# Patient Record
Sex: MALE | Race: WHITE | NOT HISPANIC OR LATINO | Employment: OTHER | URBAN - METROPOLITAN AREA
[De-identification: names, ages, dates, MRNs, and addresses within clinical notes are randomized per-mention and may not be internally consistent; named-entity substitution may affect disease eponyms.]

---

## 2017-02-07 ENCOUNTER — GENERIC CONVERSION - ENCOUNTER (OUTPATIENT)
Dept: OTHER | Facility: OTHER | Age: 67
End: 2017-02-07

## 2017-02-08 ENCOUNTER — ALLSCRIPTS OFFICE VISIT (OUTPATIENT)
Dept: OTHER | Facility: OTHER | Age: 67
End: 2017-02-08

## 2017-02-16 RX ORDER — MELOXICAM 7.5 MG/1
7.5 TABLET ORAL 2 TIMES DAILY
Status: ON HOLD | COMMUNITY
End: 2017-02-21 | Stop reason: ALTCHOICE

## 2017-02-16 RX ORDER — CLOTRIMAZOLE AND BETAMETHASONE DIPROPIONATE 10; .64 MG/G; MG/G
CREAM TOPICAL
COMMUNITY
End: 2018-09-10 | Stop reason: SDUPTHER

## 2017-02-16 RX ORDER — NITROGLYCERIN 0.4 MG/1
0.4 TABLET SUBLINGUAL
COMMUNITY

## 2017-02-16 RX ORDER — OMEPRAZOLE 20 MG/1
20 CAPSULE, DELAYED RELEASE ORAL DAILY
COMMUNITY
End: 2018-02-22 | Stop reason: SDUPTHER

## 2017-02-16 RX ORDER — METOPROLOL SUCCINATE 25 MG/1
25 TABLET, EXTENDED RELEASE ORAL DAILY
COMMUNITY
End: 2018-07-03 | Stop reason: SDUPTHER

## 2017-02-18 ENCOUNTER — ALLSCRIPTS OFFICE VISIT (OUTPATIENT)
Dept: OTHER | Facility: OTHER | Age: 67
End: 2017-02-18

## 2017-02-20 ENCOUNTER — ANESTHESIA EVENT (OUTPATIENT)
Dept: PERIOP | Facility: HOSPITAL | Age: 67
End: 2017-02-20
Payer: MEDICARE

## 2017-02-21 ENCOUNTER — ANESTHESIA (OUTPATIENT)
Dept: PERIOP | Facility: HOSPITAL | Age: 67
End: 2017-02-21
Payer: MEDICARE

## 2017-02-21 ENCOUNTER — HOSPITAL ENCOUNTER (OUTPATIENT)
Facility: HOSPITAL | Age: 67
Setting detail: OUTPATIENT SURGERY
Discharge: HOME | End: 2017-02-21
Attending: INTERNAL MEDICINE | Admitting: INTERNAL MEDICINE
Payer: MEDICARE

## 2017-02-21 ENCOUNTER — GENERIC CONVERSION - ENCOUNTER (OUTPATIENT)
Dept: OTHER | Facility: OTHER | Age: 67
End: 2017-02-21

## 2017-02-21 VITALS
SYSTOLIC BLOOD PRESSURE: 120 MMHG | HEART RATE: 48 BPM | DIASTOLIC BLOOD PRESSURE: 62 MMHG | WEIGHT: 183.8 LBS | TEMPERATURE: 97.4 F | OXYGEN SATURATION: 99 % | BODY MASS INDEX: 26.31 KG/M2 | HEIGHT: 70 IN | RESPIRATION RATE: 19 BRPM

## 2017-02-21 DIAGNOSIS — Z12.11 ENCOUNTER FOR SCREENING FOR MALIGNANT NEOPLASM OF COLON: ICD-10-CM

## 2017-02-21 PROCEDURE — 88305 TISSUE EXAM BY PATHOLOGIST: CPT | Performed by: INTERNAL MEDICINE

## 2017-02-21 RX ORDER — SODIUM CHLORIDE 9 MG/ML
INJECTION, SOLUTION INTRAVENOUS CONTINUOUS PRN
Status: DISCONTINUED | OUTPATIENT
Start: 2017-02-21 | End: 2017-02-21 | Stop reason: SURG

## 2017-02-21 RX ORDER — PROPOFOL 10 MG/ML
INJECTION, EMULSION INTRAVENOUS AS NEEDED
Status: DISCONTINUED | OUTPATIENT
Start: 2017-02-21 | End: 2017-02-21 | Stop reason: SURG

## 2017-02-21 RX ORDER — SODIUM CHLORIDE, SODIUM LACTATE, POTASSIUM CHLORIDE, CALCIUM CHLORIDE 600; 310; 30; 20 MG/100ML; MG/100ML; MG/100ML; MG/100ML
100 INJECTION, SOLUTION INTRAVENOUS CONTINUOUS
Status: DISCONTINUED | OUTPATIENT
Start: 2017-02-21 | End: 2017-02-21 | Stop reason: HOSPADM

## 2017-02-21 RX ADMIN — PROPOFOL 100 MG: 10 INJECTION, EMULSION INTRAVENOUS at 07:25

## 2017-02-21 RX ADMIN — PROPOFOL 50 MG: 10 INJECTION, EMULSION INTRAVENOUS at 07:31

## 2017-02-21 RX ADMIN — SODIUM CHLORIDE, SODIUM LACTATE, POTASSIUM CHLORIDE, AND CALCIUM CHLORIDE 100 ML/HR: .6; .31; .03; .02 INJECTION, SOLUTION INTRAVENOUS at 06:30

## 2017-02-21 RX ADMIN — SODIUM CHLORIDE: 0.9 INJECTION, SOLUTION INTRAVENOUS at 07:12

## 2017-02-21 RX ADMIN — PROPOFOL 50 MG: 10 INJECTION, EMULSION INTRAVENOUS at 07:27

## 2017-03-20 ENCOUNTER — ALLSCRIPTS OFFICE VISIT (OUTPATIENT)
Dept: OTHER | Facility: OTHER | Age: 67
End: 2017-03-20

## 2017-08-07 ENCOUNTER — ALLSCRIPTS OFFICE VISIT (OUTPATIENT)
Dept: OTHER | Facility: OTHER | Age: 67
End: 2017-08-07

## 2017-09-01 DIAGNOSIS — E78.5 HYPERLIPIDEMIA: ICD-10-CM

## 2017-09-01 DIAGNOSIS — I10 ESSENTIAL (PRIMARY) HYPERTENSION: ICD-10-CM

## 2017-09-01 DIAGNOSIS — E11.9 TYPE 2 DIABETES MELLITUS WITHOUT COMPLICATIONS (HCC): ICD-10-CM

## 2017-09-20 ENCOUNTER — APPOINTMENT (OUTPATIENT)
Dept: LAB | Facility: CLINIC | Age: 67
End: 2017-09-20
Payer: MEDICARE

## 2017-09-20 ENCOUNTER — ALLSCRIPTS OFFICE VISIT (OUTPATIENT)
Dept: OTHER | Facility: OTHER | Age: 67
End: 2017-09-20

## 2017-09-20 ENCOUNTER — TRANSCRIBE ORDERS (OUTPATIENT)
Dept: MRI IMAGING | Facility: CLINIC | Age: 67
End: 2017-09-20

## 2017-09-20 DIAGNOSIS — E11.9 TYPE 2 DIABETES MELLITUS WITHOUT COMPLICATIONS (HCC): ICD-10-CM

## 2017-09-20 DIAGNOSIS — E78.5 HYPERLIPIDEMIA: ICD-10-CM

## 2017-09-20 DIAGNOSIS — C18.3 MALIGNANT NEOPLASM OF HEPATIC FLEXURE (HCC): Primary | ICD-10-CM

## 2017-09-20 DIAGNOSIS — C18.3 MALIGNANT NEOPLASM OF HEPATIC FLEXURE (HCC): ICD-10-CM

## 2017-09-20 DIAGNOSIS — I10 ESSENTIAL (PRIMARY) HYPERTENSION: ICD-10-CM

## 2017-09-20 LAB
ALBUMIN SERPL BCP-MCNC: 3.8 G/DL (ref 3.5–5)
ALP SERPL-CCNC: 39 U/L (ref 46–116)
ALT SERPL W P-5'-P-CCNC: 49 U/L (ref 12–78)
ANION GAP SERPL CALCULATED.3IONS-SCNC: 7 MMOL/L (ref 4–13)
AST SERPL W P-5'-P-CCNC: 33 U/L (ref 5–45)
BASOPHILS # BLD AUTO: 0.03 THOUSANDS/ΜL (ref 0–0.1)
BASOPHILS NFR BLD AUTO: 1 % (ref 0–1)
BILIRUB DIRECT SERPL-MCNC: 0.14 MG/DL (ref 0–0.2)
BILIRUB SERPL-MCNC: 0.48 MG/DL (ref 0.2–1)
BUN SERPL-MCNC: 14 MG/DL (ref 5–25)
CALCIUM SERPL-MCNC: 9.2 MG/DL (ref 8.3–10.1)
CEA SERPL-MCNC: <0.5 NG/ML (ref 0–3)
CHLORIDE SERPL-SCNC: 109 MMOL/L (ref 100–108)
CHOLEST SERPL-MCNC: 137 MG/DL (ref 50–200)
CO2 SERPL-SCNC: 27 MMOL/L (ref 21–32)
CREAT SERPL-MCNC: 1.17 MG/DL (ref 0.6–1.3)
EOSINOPHIL # BLD AUTO: 0.23 THOUSAND/ΜL (ref 0–0.61)
EOSINOPHIL NFR BLD AUTO: 4 % (ref 0–6)
ERYTHROCYTE [DISTWIDTH] IN BLOOD BY AUTOMATED COUNT: 13.3 % (ref 11.6–15.1)
EST. AVERAGE GLUCOSE BLD GHB EST-MCNC: 108 MG/DL
GFR SERPL CREATININE-BSD FRML MDRD: 63 ML/MIN/1.73SQ M
GLUCOSE P FAST SERPL-MCNC: 98 MG/DL (ref 65–99)
HBA1C MFR BLD: 5.4 % (ref 4.2–6.3)
HCT VFR BLD AUTO: 42.7 % (ref 36.5–49.3)
HDLC SERPL-MCNC: 44 MG/DL (ref 40–60)
HGB BLD-MCNC: 14.7 G/DL (ref 12–17)
LDLC SERPL CALC-MCNC: 69 MG/DL (ref 0–100)
LYMPHOCYTES # BLD AUTO: 1.39 THOUSANDS/ΜL (ref 0.6–4.47)
LYMPHOCYTES NFR BLD AUTO: 27 % (ref 14–44)
MCH RBC QN AUTO: 29.5 PG (ref 26.8–34.3)
MCHC RBC AUTO-ENTMCNC: 34.4 G/DL (ref 31.4–37.4)
MCV RBC AUTO: 86 FL (ref 82–98)
MONOCYTES # BLD AUTO: 0.59 THOUSAND/ΜL (ref 0.17–1.22)
MONOCYTES NFR BLD AUTO: 11 % (ref 4–12)
NEUTROPHILS # BLD AUTO: 2.91 THOUSANDS/ΜL (ref 1.85–7.62)
NEUTS SEG NFR BLD AUTO: 57 % (ref 43–75)
NRBC BLD AUTO-RTO: 0 /100 WBCS
PLATELET # BLD AUTO: 198 THOUSANDS/UL (ref 149–390)
PMV BLD AUTO: 9.8 FL (ref 8.9–12.7)
POTASSIUM SERPL-SCNC: 4.3 MMOL/L (ref 3.5–5.3)
PROT SERPL-MCNC: 6.8 G/DL (ref 6.4–8.2)
RBC # BLD AUTO: 4.98 MILLION/UL (ref 3.88–5.62)
SODIUM SERPL-SCNC: 143 MMOL/L (ref 136–145)
TRIGL SERPL-MCNC: 120 MG/DL
WBC # BLD AUTO: 5.17 THOUSAND/UL (ref 4.31–10.16)

## 2017-09-20 PROCEDURE — 36415 COLL VENOUS BLD VENIPUNCTURE: CPT

## 2017-09-20 PROCEDURE — 80053 COMPREHEN METABOLIC PANEL: CPT

## 2017-09-20 PROCEDURE — 82378 CARCINOEMBRYONIC ANTIGEN: CPT

## 2017-09-20 PROCEDURE — 83036 HEMOGLOBIN GLYCOSYLATED A1C: CPT

## 2017-09-20 PROCEDURE — 80061 LIPID PANEL: CPT

## 2017-09-20 PROCEDURE — 85025 COMPLETE CBC W/AUTO DIFF WBC: CPT

## 2017-09-20 PROCEDURE — 82248 BILIRUBIN DIRECT: CPT

## 2017-09-21 ENCOUNTER — GENERIC CONVERSION - ENCOUNTER (OUTPATIENT)
Dept: OTHER | Facility: OTHER | Age: 67
End: 2017-09-21

## 2018-01-11 NOTE — PROGRESS NOTES
Assessment  Assessed    1  CAD S/P percutaneous coronary angioplasty (414 01,V45 82) (I25 10,Z98 61)   2  S/P CABG (coronary artery bypass graft) (V45 81) (Z95 1)   3  Hypertension (401 9) (I10)   4  Hyperlipidemia (272 4) (E78 5)    Discussion/Summary  Counseling Documentation With Imm: The patient was counseled regarding instructions for management, risk factor reductions, patient and family education, importance of compliance with treatment  Medication SE Review and Pt Understands Tx: Possible side effects of new medications were reviewed with the patient/guardian today  The treatment plan was reviewed with the patient/guardian  The patient/guardian understands and agrees with the treatment plan       Discussion Summary:   CAD s/p PCI in 2007 and 2 vessel CABG in Feb 2008 at [facility] echo (Mar 2014) - EF 55+/-5%, no significant valvular pathology  Exercise nuclear stress test (Mar 2014) - moderate sized fixed defect in the basal and mid inferior wall  No evidence of reversible ischemia  Patient to continue his aspirin, enalapril, metoprolol, simvastatin, fenofibrate  S/L NTG use explained again    HTN - Blood pressure stable on his current meds which I recommend continuing    HLP - Patient on simvastatin  Continue aggressive risk factor modification and therapeutic lifestyle changes  Low salt, low calorie, low fat, low cholesterol diet with regular exercise and to optimize weight  Previous studies were reviewed with the patient in detail  Medications were reviewed  Discussed concepts of atherosclerosis, including signs and symptoms of cardiac disease  Safety measures were reviewed  Questions were entertained and answered  Patient was advised to report any problem(s) requiring medical attention  Return for follow up visit as scheduled or earlier if needed  Thank you for allowing me to participate in the care and evaluation of your patient   Should you have any questions, please feel free to contact me  Chief Complaint  Chief Complaint Chronic Condition [facility]: Patient is here today for follow up of chronic conditions described in HPI  History of Present Illness  HPI: 58-year-old male with past medical history significant for hypertension, hyperlipidemia, coronary artery disease status post PCI in 2007 and 2 vessel CABG in February 2008 at [facility]  CAD S/P PCI, S/P CABG - patient comes for follow-up  Patient denies exertional chest pain/pressure/tightness, shortness of breath, palpitations, lightheadedness, syncope, swelling feet, orthopnea, PND, claudication  he is suffering from right leg sciatica which is limiting his activity  HTN - taking his ACE-I and BB regularly  Denies lightheadedness, headache  HLP - on fenofibrate and simvastatin  Dr Avelino Sy closely monitors his blood work       Coronary Artery Disease (Follow-Up): The patient states he has been stable with his coronary artery disease symptoms since the last visit  Comorbid Illnesses: hypertension  Complications: MI    Symptoms: denies chest pain when at rest, denies exertional chest pain, denies dyspnea, denies fatigue and denies exercise intolerance  Associated symptoms include no syncope, no palpitations, no PND, no orthopnea and no edema  His symptoms do not limit his activities  Medications: the patient is adherent with his medication regimen  He denies medication side effects  Hypertension (Follow-Up): The patient presents for follow-up of primary hypertension  The patient states he has been stable with his blood pressure control since the last visit  Comorbid Illnesses: coronary artery disease  Symptoms: denies impaired vision, denies dyspnea, denies chest pain, denies intermittent leg claudication and denies lower extremity edema  Associated symptoms include no headache, no focal neurologic deficits and no memory loss     Medications: the patient is adherent with his medication regimen  He denies medication side effects  Hyperlipidemia (Follow-Up): The patient states his hyperlipidemia has been stable since the last visit  Comorbid Illnesses: coronary artery disease and hypertension  Symptoms: denies chest pain, denies intermittent leg claudication, denies muscle pain and denies muscle weakness  Associated symptoms include no focal neurologic deficits and no memory loss  Medications: the patient is adherent with his medication regimen  He denies medication side effects  Review of Systems  Cardiology Male ROS:     Cardiac: No complaints of chest pain, no palpitations, no fainiting  Skin: No complaints of nonhealing sores or skin rash  Genitourinary: No complaints of recurrent urinary tract infections, frequent urination at night, difficult urination, blood in urine, kidney stones, loss of bladder control, no kidney or prostate problems, no erectile dysfunction  Psychological: No complaints of feeling depressed, anxiety, panic attacks, or difficulty concentrating  General: No complaints of trouble sleeping, lack of energy, fatigue, appetite changes, weight changes, fever, frequent infections, or night sweats  Respiratory: No complaints of shortness of breath, cough with sputum, or wheezing  HEENT: No complaints of serious problems, hearing problems, nose problems, throat problems, or snoring  Gastrointestinal: No complaints of liver problems, nausea, vomiting, heartburn, constipation, bloody stools, diarrhea, problems swallowing, adbominal pain, or rectal bleeding  Hematologic: No complaints of bleeding disorders, anemia, blood clots, or excessive brusing  Neurological: numbnes and tingling   Musculoskeletal: back pain       ROS Reviewed:   ROS reviewed  Active Problems  Problems    1  Abnormal electrocardiogram (794 31) (R94 31)   2  Adenocarcinoma of large intestine (153 9) (C18 9)   3  Allergic rhinitis (477 9) (J30 9)   4   Anxiety (300 00) (F41 9)   5  Atherosclerosis (440 9) (I70 90)   6  CAD S/P percutaneous coronary angioplasty (414 01,V45 82) (I25 10,Z98 61)   7  Colitis (558 9) (K52 9)   8  Colonoscopy (Fiberoptic) Screening   9  Depression (311) (F32 9)   10  Diabetes mellitus type II, controlled (250 00) (E11 9)   11  Diarrhea (787 91) (R19 7)   12  Difficulty breathing (786 09) (R06 89)   13  Encounter for colonoscopy due to history of colon cancer (V76 51,V10 05)    (Z12 11,Z85 038)   14  Gastritis (535 50) (K29 70)   15  Hepatic cyst (573 8) (K76 89)   16  Hydrocele, bilateral (603 9) (N43 3)   17  Hyperlipidemia (272 4) (E78 5)   18  Hypertension (401 9) (I10)   19  Hypokalemia (276 8) (E87 6)   20  Impacted cerumen, unspecified laterality (380 4) (H61 20)   21  Impaired fasting glucose (790 21) (R73 01)   22  Iron deficiency anemia (280 9) (D50 9)   23  Left shoulder pain (719 41) (M25 512)   24  Passage of loose stools (787 91) (R19 7)   25  Pre-operative cardiovascular examination (V72 81) (Z01 810)   26  S/P CABG (coronary artery bypass graft) (V45 81) (Z95 1)   27  Sciatica (724 3) (M54 30)   28  Shortness of breath (786 05) (R06 02)   29  Stomach discomfort (536 8) (R10 9)    Past Medical History  Problems    1  History of Coronary arteriosclerosis (414 00) (I25 10)   2  History of Depression (311) (F32 9)   3  History of Difficulty breathing (786 09) (R06 89)   4  Healed myocardial infarct (412) (I25 2)   5  History of allergic rhinitis (V12 69) (Z87 09)   6  History of hyperlipidemia (V12 29) (Z86 39)   7  History of hypertension (V12 59) (Z86 79)   8  History of psoriasis (V13 3) (Z87 2)   9  History of Impacted cerumen, unspecified laterality (380 4) (H61 20)   10  History of Impaired fasting glucose (790 21) (R73 01)   11  History of Other Symptoms Involving The Abdomen And Pelvis (789 9)  Active Problems And Past Medical History Reviewed: The active problems and past medical history were reviewed and updated today  Surgical History  Problems    1  History of CABG   2  History of Cath Stent Placement  Surgical History Reviewed: The surgical history was reviewed and updated today  Family History  Family History    1  Family history of Colon Cancer (V16 0)   2  Family history of Colonic Diverticulosis   3  No pertinent family history  Family History Reviewed: The family history was reviewed and updated today  Social History  Problems    · Alcohol Use (History)   · Being A Social Drinker   · Denied: Drug Use (305 90)   · Former smoker (T26 82) (C47 483)  Social History Reviewed: The social history was reviewed and updated today  The social history was reviewed and is unchanged  Current Meds   1  ALPRAZolam 0 25 MG Oral Tablet; TAKE 1 TABLET 3 TIMES DAILY AS NEEDED; Therapy: 75RWL3460 to (Evaluate:13Jan2016); Last Rx:34Cvw0908 Ordered   2  Aspirin 81 MG Oral Tablet; Therapy: (Recorded:31Jan2014) to Recorded   3  BL Vitamin B-6 100 MG TABS; Therapy: (Recorded:04Jun2015) to Recorded   4  Enalapril Maleate 2 5 MG Oral Tablet; TAKE 1 TABLET DAILY  Requested for: 09Oja1336; Last Rx:14Mja8864 Ordered   5  Fenofibrate 48 MG Oral Tablet; take one tablet by mouth every day; Therapy: 77Gki3527 to (Evaluate:71Ejt2235)  Requested for: 92Kto5893; Last   Rx:91Hym5684 Ordered   6  Hydrocodone-Acetaminophen 5-300 MG Oral Tablet; TAKE 1 TABLET EVERY 4 TO 6   HOURS AS NEEDED FOR PAIN;   Therapy: 40IRN7714 to (Evaluate:73Tki5474); Last Rx:49Awz3496 Ordered   7  Lotrisone 1-0 05 % External Cream (Clotrimazole-Betamethasone); Therapy: (Recorded:31Jan2014) to Recorded   8  Meloxicam 7 5 MG Oral Tablet; TAKE 1 TABLET TWICE DAILY; Therapy: 54VQW7562 to 28-64-66-98)  Requested for: 15ESZ7476; Last   Rx:35Kbl7217 Ordered   9  Metoprolol Succinate ER 25 MG Oral Tablet Extended Release 24 Hour; TAKE 1 TABLET   DAILY  Requested for: 21SSQ0606; Last Rx:30Bpp6664 Ordered   10   Nitrostat 0 4 MG Sublingual Tablet Sublingual; PLACE 1 TABLET UNDER THE TONGUE    EVERY 5 MINUTES UP TO 3 DOSES AS NEEDED FOR CHEST PAIN;    Therapy: 57AGF3855 to (Evaluate:20Sqr1650)  Requested for: 46NKK7798; Last    Rx:25Mar2015 Ordered   11  Omeprazole 20 MG Oral Capsule Delayed Release; TAKE 1 CAPSULE Daily; Therapy: 92TGN6773 to (Last Rx:22Rim3625)  Requested for: 50VRR6373 Ordered   12  Simvastatin 40 MG Oral Tablet; TAKE 1 TABLET DAILY  Requested for: 89OUN9234; Last    Rx:25Jan2016 Ordered  Medication List Reviewed: The medication list was reviewed and updated today  Allergies  Medication    1  Penicillins    Vitals  Vital Signs [Data Includes: Current Encounter]    Recorded: [id number] 03:47PM   Heart Rate 60   Systolic 347   Diastolic 88   Height 5 ft 10 in   Weight 177 lb    BMI Calculated 25 4   BSA Calculated 1 99   O2 Saturation 97     Physical Exam    Constitutional   General appearance: No acute distress, well appearing and well nourished  Eyes   Conjunctiva and Sclera examination: Conjunctiva pink, sclera anicteric  Ears, Nose, Mouth, and Throat - External inspection of ears and nose: Normal without deformities or discharge  Oropharynx: Clear, nares are clear, mucous membranes are moist    Neck   Neck and thyroid: Normal, supple, trachea midline, no thyromegaly  Pulmonary   Respiratory effort: No increased work of breathing or signs of respiratory distress  Cardiovascular   Palpation of heart: Normal PMI, no thrills  Auscultation of heart: Normal rate and rhythm, normal S1 and S2, no murmurs  Carotid pulses: Normal, 2+ bilaterally  Examination of extremities for edema and/or varicosities: Normal     Chest -   Abnormal   Prior CABG scar +  Abdomen   Abdomen: Non-tender and no distention  Liver and spleen: No hepatomegaly or splenomegaly  Musculoskeletal Gait and station: Abnormal  uusing a cane for support  Digits and nails: Normal without clubbing or cyanosis     Skin - Skin and subcutaneous tissue: Normal without rashes or lesions  Skin is warm and well perfused, normal turgor  Neurologic - Speech: Normal     Psychiatric - Orientation to person, place, and time: Normal  Mood and affect: Normal       Health Management  Colonoscopy (Fiberoptic) Screening   COLONOSCOPY; every 1 year; Last 57LSI2969; Next Due: [id number];  Overdue    Future Appointments    Date/Time Provider Specialty Site   04/12/2016 04:15 PM Aly Fernandes, [facility] Internal Medicine [facility]  2  CT     Signatures   Electronically signed by : BHAVIK Alcantara ; Feb 4 2016  4:14PM EST                       (Author)

## 2018-01-11 NOTE — PROGRESS NOTES
Assessment    1  Sciatica (724 3) (M54 30)    Plan  Sciatica    · From  Vicodin 5-300 MG Oral Tablet TAKE 1 TABLET BY MOUTH EVERY 4 TO 6  HOURS AS NEEDED FOR PAIN To Hydrocodone-Acetaminophen 5-300 MG Oral Tablet  TAKE 1 TABLET EVERY 4 TO 6 HOURS AS NEEDED FOR PAIN    Discussion/Summary    Sciatica, discussed pathophysiology  We will try meloxicam 7 5 mg twice a day for one week  Can alternate heat and ice, Vicodin as needed for severe pain  If symptoms are not improving consider physical therapy versus pain management  Chief Complaint  Lower back pain radiating down leg      History of Present Illness  HPI: Patient had been shoveling last weekend  He started with lower back pain  That resolved but now he has pain in the right buttock going down the right side  He states he can't get comfortable at night  He's not taken anything over the top counter except for Tylenol  This is ineffective  No numbness or tingling  No weakness  Just pain he rates it about a 5/10  Worse with movement or lying on a      Review of Systems    Constitutional: no fever or chills, feels well, no tiredness, no recent weight loss or weight gain  ENT: no complaints of earache, no loss of hearing, no nosebleeds or nasal discharge, no sore throat or hoarseness  Cardiovascular: no complaints of slow or fast heart rate, no chest pain, no palpitations, no leg claudication or lower extremity edema  Respiratory: no complaints of shortness of breath, no wheezing or cough, no dyspnea on exertion, no orthopnea or PND  Gastrointestinal: no complaints of abdominal pain, no constipation, no nausea or vomiting, no diarrhea or bloody stools  Genitourinary: no complaints of dysuria or incontinence, no hesitancy, no nocturia, no genital lesion, no inadequacy of penile erection  Musculoskeletal: as noted in HPI  Integumentary: no complaints of skin rash or lesion, no itching or dry skin, no skin wounds     Neurological: no complaints of headache, no confusion, no numbness or tingling, no dizziness or fainting  Active Problems    1  Abnormal electrocardiogram (794 31) (R94 31)   2  Adenocarcinoma of large intestine (153 9) (C18 9)   3  Allergic rhinitis (477 9) (J30 9)   4  Anxiety (300 00) (F41 9)   5  Atherosclerosis (440 9) (I70 90)   6  CAD S/P percutaneous coronary angioplasty (414 01,V45 82) (I25 10,Z98 61)   7  Colitis (558 9) (K52 9)   8  Colonoscopy (Fiberoptic) Screening   9  Depression (311) (F32 9)   10  Diabetes mellitus type II, controlled (250 00) (E11 9)   11  Diarrhea (787 91) (R19 7)   12  Difficulty breathing (786 09) (R06 89)   13  Encounter for colonoscopy due to history of colon cancer (V76 51,V10 05)    (Z12 11,Z85 038)   14  Gastritis (535 50) (K29 70)   15  Hepatic cyst (573 8) (K76 89)   16  Hydrocele, bilateral (603 9) (N43 3)   17  Hyperlipidemia (272 4) (E78 5)   18  Hypertension (401 9) (I10)   19  Hypokalemia (276 8) (E87 6)   20  Impacted cerumen, unspecified laterality (380 4) (H61 20)   21  Impaired fasting glucose (790 21) (R73 01)   22  Iron deficiency anemia (280 9) (D50 9)   23  Left shoulder pain (719 41) (M25 512)   24  Passage of loose stools (787 91) (R19 7)   25  Pre-operative cardiovascular examination (V72 81) (Z01 810)   26  S/P CABG (coronary artery bypass graft) (V45 81) (Z95 1)   27  Shortness of breath (786 05) (R06 02)   28  Stomach discomfort (536 8) (R10 9)    Past Medical History  Active Problems And Past Medical History Reviewed: The active problems and past medical history were reviewed and updated today  Social History    · Alcohol Use (History)   · RARELY   · Being A Social Drinker   · Denied: Drug Use (305 90)   · Former smoker (V49 41) (S85 013)  The social history was reviewed and updated today  Family History  Family History Reviewed: The family history was reviewed and updated today  Current Meds   1   ALPRAZolam 0 25 MG Oral Tablet; TAKE 1 TABLET 3 TIMES DAILY AS NEEDED; Therapy: 99WAD1672 to (Evaluate:13Jan2016); Last Rx:55Fmc7099 Ordered   2  Aspirin 81 MG Oral Tablet; Therapy: (Recorded:31Jan2014) to Recorded   3  BL Vitamin B-6 100 MG TABS; Therapy: (Recorded:04Jun2015) to Recorded   4  Enalapril Maleate 2 5 MG Oral Tablet; TAKE 1 TABLET DAILY  Requested for: 41Jsl6654; Last Rx:62Ulg1270 Ordered   5  Fenofibrate 48 MG Oral Tablet; take one tablet by mouth every day; Therapy: 46Hft1460 to (Evaluate:29Qzr1250)  Requested for: 90Cni5256; Last   Rx:30Kju4041 Ordered   6  Lotrisone 1-0 05 % External Cream;   Therapy: (Recorded:31Jan2014) to Recorded   7  Meloxicam 7 5 MG Oral Tablet; TAKE 1 TABLET TWICE DAILY; Therapy: 40XOO5144 to 9615 9032)  Requested for: 12CGJ3678; Last   Rx:66Nue1950 Ordered   8  Metoprolol Succinate ER 25 MG Oral Tablet Extended Release 24 Hour; TAKE 1 TABLET   DAILY  Requested for: 58OTD1101; Last Rx:98Uxi5463 Ordered   9  Nitrostat 0 4 MG Sublingual Tablet Sublingual; PLACE 1 TABLET UNDER THE TONGUE   EVERY 5 MINUTES UP TO 3 DOSES AS NEEDED FOR CHEST PAIN;   Therapy: 64BOJ8645 to (Evaluate:86Fwz8818)  Requested for: 80MLQ9741; Last   Rx:25Mar2015 Ordered   10  Omeprazole 20 MG Oral Capsule Delayed Release; TAKE 1 CAPSULE Daily; Therapy: 15WHO8108 to (Last Rx:58Ywz0210)  Requested for: 57BYU2305 Ordered   11  Simvastatin 40 MG Oral Tablet; TAKE 1 TABLET DAILY  Requested for: 03AQI9914; Last    Rx:25Jan2016 Ordered    The medication list was reviewed and updated today  Allergies    1  Penicillins    Vitals   Recorded: 43PQH0408 04:18PM   Heart Rate 60   Systolic 148   Diastolic 65   Height 5 ft 10 in   Weight 174 lb    BMI Calculated 24 97   BSA Calculated 1 97     Physical Exam    Constitutional   General appearance: No acute distress, well appearing and well nourished  Musculoskeletal   Inspection/palpation of joints, bones, and muscles: Abnormal   Pain with deep palpation to right buttock  Full range of motion  Future Appointments    Date/Time Provider Specialty Site   04/12/2016 04:15 PM Jose Manuel Sanchez, [address] Internal Medicine [address] [facility]   02/04/2016 03:30 PM BHAVIK Mendoza   Cardiology  [address]     Signatures   Electronically signed by : YONATAN Mckeon; Feb 1 2016  5:04PM EST                       (Author)    Electronically signed by : BHAVIK Humphries ; Feb 1 2016  5:48PM EST

## 2018-01-13 VITALS
TEMPERATURE: 98.1 F | BODY MASS INDEX: 27.17 KG/M2 | DIASTOLIC BLOOD PRESSURE: 70 MMHG | HEART RATE: 65 BPM | WEIGHT: 189.38 LBS | RESPIRATION RATE: 12 BRPM | SYSTOLIC BLOOD PRESSURE: 128 MMHG | OXYGEN SATURATION: 98 %

## 2018-01-14 VITALS
SYSTOLIC BLOOD PRESSURE: 120 MMHG | HEIGHT: 70 IN | WEIGHT: 190 LBS | BODY MASS INDEX: 27.2 KG/M2 | DIASTOLIC BLOOD PRESSURE: 76 MMHG | RESPIRATION RATE: 12 BRPM | HEART RATE: 72 BPM

## 2018-01-14 VITALS
HEART RATE: 50 BPM | OXYGEN SATURATION: 97 % | SYSTOLIC BLOOD PRESSURE: 134 MMHG | DIASTOLIC BLOOD PRESSURE: 72 MMHG | HEIGHT: 70 IN | WEIGHT: 191.5 LBS | BODY MASS INDEX: 27.41 KG/M2

## 2018-01-14 VITALS
DIASTOLIC BLOOD PRESSURE: 78 MMHG | SYSTOLIC BLOOD PRESSURE: 134 MMHG | HEIGHT: 70 IN | HEART RATE: 65 BPM | BODY MASS INDEX: 26.77 KG/M2 | WEIGHT: 187 LBS | OXYGEN SATURATION: 98 %

## 2018-01-15 VITALS
BODY MASS INDEX: 26.56 KG/M2 | RESPIRATION RATE: 12 BRPM | DIASTOLIC BLOOD PRESSURE: 70 MMHG | SYSTOLIC BLOOD PRESSURE: 120 MMHG | WEIGHT: 185.5 LBS | HEIGHT: 70 IN | HEART RATE: 62 BPM

## 2018-01-15 NOTE — PROGRESS NOTES
Assessment    1  Encounter for colonoscopy due to history of colon cancer (V76 51,V10 05)   (Z12 11,Z85 038)    Plan  Encounter for colonoscopy due to history of colon cancer    · ENDOSCOPY - PROCEDURE, RISKS, AND BENEFITS; Status:Complete;   Done:  20PZD7850   Ordered;  For:Encounter for colonoscopy due to history of colon cancer; Ordered By:Lakesha Currie;   · We recommend a colonoscopy ; Status:Complete;   Done: 58MDY7510   Ordered;  For:Encounter for colonoscopy due to history of colon cancer; Ordered By:Lakesha Currie;    Discussion/Summary  Discussion Summary:   Patient is scheduled for colonoscopy- last colonoscopy was in 1/2015  Chief Complaint  Chief Complaint Free Text Note Form: screening colonoscopy  GERD   Chief Complaint Chronic Condition [facility]: Patient is here today for follow up of chronic conditions described in HPI  History of Present Illness  HPI: 74year old male found to have colorectal cancer in 3/2014 during colonosocpy- referred for surgical oncology eval and patient had resection of transverse colon  He had a repeat colonoscopy in 1/2015 that showed mild diverticulosis  No LGI symptoms- no fatigue, fever, chills, unintentional weight loss  No hematochezia, melena or change in stool patterns  NO chest pain or SOB  UGI- GERD symptoms controlled on PPI therapy with no breakthrough symptoms  No heartburn, reflux, nausea, vomiting or epigastric pain post prandially  Follows with Dr Judy West an an as needed basis now  History Reviewed: The history was obtained today from the patient and I agree with the documented history  Review of Systems  Complete-Male GI Adult:   Constitutional: as noted in HPI, not feeling poorly and not feeling tired  Eyes: no eye pain  ENT: no sore throat and no hoarseness  Respiratory: no cough  Gastrointestinal: as noted in HPI  Musculoskeletal: no arthralgias and no myalgias  Integumentary: no rashes  Neurological: no headache  Active Problems    1  Abnormal electrocardiogram (794 31) (R94 31)   2  Adenocarcinoma of large intestine (153 9) (C18 9)   3  Allergic rhinitis (477 9) (J30 9)   4  Anxiety (300 00) (F41 9)   5  Atherosclerosis (440 9) (I70 90)   6  CAD S/P percutaneous coronary angioplasty (414 01,V45 82) (I25 10,Z98 61)   7  Colitis (558 9) (K52 9)   8  Colonoscopy (Fiberoptic) Screening   9  Depression (311) (F32 9)   10  Diabetes mellitus type II, controlled (250 00) (E11 9)   11  Diarrhea (787 91) (R19 7)   12  Difficulty breathing (786 09) (R06 89)   13  Gastritis (535 50) (K29 70)   14  Hepatic cyst (573 8) (K76 89)   15  Hydrocele, bilateral (603 9) (N43 3)   16  Hyperlipidemia (272 4) (E78 5)   17  Hypertension (401 9) (I10)   18  Hypokalemia (276 8) (E87 6)   19  Impacted cerumen, unspecified laterality (380 4) (H61 20)   20  Impaired fasting glucose (790 21) (R73 01)   21  Iron deficiency anemia (280 9) (D50 9)   22  Left shoulder pain (719 41) (M25 512)   23  Passage of loose stools (787 91) (R19 7)   24  Pre-operative cardiovascular examination (V72 81) (Z01 810)   25  S/P CABG (coronary artery bypass graft) (V45 81) (Z95 1)   26  Shortness of breath (786 05) (R06 02)   27  Stomach discomfort (536 8) (R10 9)    Past Medical History    1  History of Coronary arteriosclerosis (414 00) (I25 10)   2  History of Depression (311) (F32 9)   3  History of Difficulty breathing (786 09) (R06 89)   4  Healed myocardial infarct (412) (I25 2)   5  History of allergic rhinitis (V12 69) (Z87 09)   6  History of hyperlipidemia (V12 29) (Z86 39)   7  History of hypertension (V12 59) (Z86 79)   8  History of psoriasis (V13 3) (Z87 2)   9  History of Impacted cerumen, unspecified laterality (380 4) (H61 20)   10  History of Impaired fasting glucose (790 21) (R73 01)   11  History of Other Symptoms Involving The Abdomen And Pelvis (789 9)  Active Problems And Past Medical History Reviewed:    The active problems and past medical history were reviewed and updated today  Surgical History    1  History of CABG   2  History of Cath Stent Placement  Surgical History Reviewed: The surgical history was reviewed and updated today  Family History    1  Family history of Colon Cancer (V16 0)   2  Family history of Colonic Diverticulosis   3  No pertinent family history  Family History Reviewed: The family history was reviewed and updated today  Social History    · Alcohol Use (History)   · Being A Social Drinker   · Denied: Drug Use (305 90)   · Former smoker (O60 91) (W90 138)  Social History Reviewed: The social history was reviewed and updated today  Current Meds   1  ALPRAZolam 0 25 MG Oral Tablet; TAKE 1 TABLET 3 TIMES DAILY AS NEEDED; Therapy: 98UDM3052 to (Evaluate:13Jan2016); Last Rx:17Jtl4210 Ordered   2  Aspirin 81 MG Oral Tablet; Therapy: (Recorded:31Jan2014) to Recorded   3  BL Vitamin B-6 100 MG TABS; Therapy: (Recorded:04Jun2015) to Recorded   4  Enalapril Maleate 2 5 MG Oral Tablet; TAKE 1 TABLET DAILY  Requested for: 54Ilo9636; Last Rx:35Erx6927 Ordered   5  Fenofibrate 48 MG Oral Tablet; take one tablet by mouth every day; Therapy: 98Jfz4293 to (Evaluate:82Des9358)  Requested for: 66Xzw8509; Last   Rx:54Pkj1462 Ordered   6  Lotrisone 1-0 05 % External Cream;   Therapy: (Recorded:31Jan2014) to Recorded   7  Meloxicam 7 5 MG Oral Tablet; TAKE 1 TABLET TWICE DAILY; Therapy: 33IIB9244 to )  Requested for: 27HNI9517; Last   Rx:22Sfb5421 Ordered   8  Metoprolol Succinate ER 25 MG Oral Tablet Extended Release 24 Hour; TAKE 1 TABLET   DAILY  Requested for: 36SGN4015; Last Rx:28Udx4809 Ordered   9  Nitrostat 0 4 MG Sublingual Tablet Sublingual; PLACE 1 TABLET UNDER THE TONGUE   EVERY 5 MINUTES UP TO 3 DOSES AS NEEDED FOR CHEST PAIN;   Therapy: 03ZOJ2674 to (Evaluate:79Ryz6670)  Requested for: 50VLW4017; Last   Rx:25Mar2015 Ordered   10   Omeprazole 20 MG Oral Capsule Delayed Release; TAKE 1 CAPSULE Daily; Therapy: 13LBA2026 to (Last Rx:57Jyn0329)  Requested for: 41XMV0246 Ordered   11  Simvastatin 40 MG Oral Tablet; TAKE 1 TABLET DAILY  Requested for: 47Ttm7428; Last    Rx:57Ggw9653 Ordered  Medication List Reviewed: The medication list was reviewed and updated today  Allergies    1  Penicillins    Vitals  Vital Signs [Data Includes: Current Encounter]    Recorded: 70DWR8640 85:61MM   Systolic 231   Diastolic 76   Height 5 ft 10 in   Weight 177 lb    BMI Calculated 25 4   BSA Calculated 1 98     Physical Exam    Constitutional   General appearance: Abnormal   overweight  Eyes anicteric  Pupils and irises: Equal, round and reactive to light  Ears, Nose, Mouth, and Throat   External inspection of ears and nose: Normal     Nasal mucosa, septum, and turbinates: Normal without edema or erythema  Oropharynx: Normal with no erythema, edema, exudate or lesions  Pulmonary   Respiratory effort: No increased work of breathing or signs of respiratory distress  Auscultation of lungs: Clear to auscultation, equal breath sounds bilaterally, no wheezes, no rales, no rhonci  Cardiovascular   Auscultation of heart: Normal rate and rhythm, normal S1 and S2, without murmurs  Examination of extremities for edema and/or varicosities: Normal     Carotid pulses: Normal     Abdomen   Abdomen: Non-tender, no masses  Liver and spleen: No hepatomegaly or splenomegaly  Lymphatic   Palpation of lymph nodes in neck: No lymphadenopathy  Musculoskeletal   Digits and nails: Normal without clubbing or cyanosis  Inspection/palpation of joints, bones, and muscles: Normal     Skin   Skin and subcutaneous tissue: Normal without rashes or lesions  Neurologic no nystagmus or asterixis  Reflexes: 2+ and symmetric  Sensation: No sensory loss      Psychiatric   Orientation to person, place and time: Normal     Mood and affect: Normal          Health Management  Colonoscopy (Fiberoptic) Screening   COLONOSCOPY; every 1 year; Last 85IUB8216; Next Due: T520348; Overdue    Future Appointments    Date/Time Provider Specialty Site   04/12/2016 04:15 PM Sarah Cote Internal Medicine [facility]   01/20/2016 09:45 AM BHAVIK Malone   Gastroenterology Adult [facility]     Signatures   Electronically signed by : Sarah Jaquez; Jan 12 2016 10:33AM EST                       (Author)    Electronically signed by : BHAVIK Huizar ; Jan 12 2016 10:34AM EST                       (Author)

## 2018-02-05 DIAGNOSIS — E78.5 HYPERLIPIDEMIA, UNSPECIFIED HYPERLIPIDEMIA TYPE: Primary | ICD-10-CM

## 2018-02-05 RX ORDER — SIMVASTATIN 40 MG
40 TABLET ORAL
Qty: 30 TABLET | Refills: 5 | Status: SHIPPED | OUTPATIENT
Start: 2018-02-05 | End: 2019-01-28 | Stop reason: SDUPTHER

## 2018-02-07 ENCOUNTER — TELEPHONE (OUTPATIENT)
Dept: INTERNAL MEDICINE CLINIC | Facility: CLINIC | Age: 68
End: 2018-02-07

## 2018-02-07 DIAGNOSIS — I10 HYPERTENSION, UNSPECIFIED TYPE: Primary | ICD-10-CM

## 2018-02-07 DIAGNOSIS — E78.5 HYPERLIPIDEMIA, UNSPECIFIED HYPERLIPIDEMIA TYPE: Primary | ICD-10-CM

## 2018-02-07 DIAGNOSIS — E78.49 OTHER HYPERLIPIDEMIA: ICD-10-CM

## 2018-02-07 DIAGNOSIS — I10 HYPERTENSION, UNSPECIFIED TYPE: ICD-10-CM

## 2018-02-07 RX ORDER — ENALAPRIL MALEATE 5 MG/1
5 TABLET ORAL DAILY
Qty: 90 TABLET | Refills: 3 | Status: SHIPPED | OUTPATIENT
Start: 2018-02-07 | End: 2019-01-28 | Stop reason: SDUPTHER

## 2018-02-07 RX ORDER — FENOFIBRATE 145 MG/1
145 TABLET, COATED ORAL DAILY
Qty: 90 TABLET | Refills: 3 | Status: SHIPPED | OUTPATIENT
Start: 2018-02-07 | End: 2019-01-28 | Stop reason: SDUPTHER

## 2018-02-07 RX ORDER — FENOFIBRATE 145 MG/1
1 TABLET, COATED ORAL DAILY
COMMUNITY
Start: 2014-08-27 | End: 2018-02-07 | Stop reason: SDUPTHER

## 2018-02-07 RX ORDER — ENALAPRIL MALEATE 2.5 MG/1
2.5 TABLET ORAL DAILY
Qty: 30 TABLET | Refills: 3 | Status: SHIPPED | OUTPATIENT
Start: 2018-02-07 | End: 2018-02-07

## 2018-02-07 RX ORDER — FENOFIBRATE 48 MG/1
48 TABLET, COATED ORAL DAILY
Qty: 90 TABLET | Refills: 0 | Status: SHIPPED | OUTPATIENT
Start: 2018-02-07 | End: 2018-02-07

## 2018-02-07 RX ORDER — ENALAPRIL MALEATE 5 MG/1
1 TABLET ORAL DAILY
COMMUNITY
End: 2018-02-07 | Stop reason: SDUPTHER

## 2018-02-07 NOTE — TELEPHONE ENCOUNTER
Pt very concerned he has been without his meds for a couple of days    Left messages and still no RX  Please call pt when this is done [phone]    RX refills  Enalapril  Fenotribate

## 2018-02-07 NOTE — TELEPHONE ENCOUNTER
PT SD HE WANTS TO SPK TO DR FINLEY HIS MED'S  Karrie Nissen HE WENT TO Psychiatric AND THE ENALAPRIL WAS 2 5 & IS  USUALLY 5 MG AND FENOFIBRATE WAS ABOUT 1/3 DOSE HE USUALLY TAKES    IT WAS 48 MG RATHER THEN 145 Mg  HE WANTS TO KNOW WHY ? ??

## 2018-02-22 ENCOUNTER — OFFICE VISIT (OUTPATIENT)
Dept: INTERNAL MEDICINE CLINIC | Facility: CLINIC | Age: 68
End: 2018-02-22
Payer: MEDICARE

## 2018-02-22 VITALS
BODY MASS INDEX: 27.49 KG/M2 | RESPIRATION RATE: 14 BRPM | TEMPERATURE: 98.7 F | HEART RATE: 80 BPM | SYSTOLIC BLOOD PRESSURE: 118 MMHG | DIASTOLIC BLOOD PRESSURE: 62 MMHG | HEIGHT: 70 IN | WEIGHT: 192 LBS

## 2018-02-22 DIAGNOSIS — J06.9 VIRAL UPPER RESPIRATORY TRACT INFECTION: Primary | ICD-10-CM

## 2018-02-22 PROCEDURE — 99213 OFFICE O/P EST LOW 20 MIN: CPT | Performed by: PHYSICIAN ASSISTANT

## 2018-02-22 RX ORDER — OMEPRAZOLE 20 MG/1
1 CAPSULE, DELAYED RELEASE ORAL DAILY
COMMUNITY
Start: 2015-05-27 | End: 2018-09-10 | Stop reason: SDUPTHER

## 2018-02-22 RX ORDER — PAROXETINE 30 MG/1
TABLET, FILM COATED ORAL
COMMUNITY
Start: 2018-01-03 | End: 2018-09-10

## 2018-02-22 RX ORDER — PYRIDOXINE HCL (VITAMIN B6) 100 MG
TABLET ORAL
COMMUNITY
End: 2018-02-22 | Stop reason: ALTCHOICE

## 2018-02-22 NOTE — PROGRESS NOTES
Assessment/Plan:  Recommend Allegra-D 12 once a day and Tylenol or ibuprofen every 4-6 hours return if he worsens    No problem-specific Assessment & Plan notes found for this encounter  Diagnoses and all orders for this visit:    Viral upper respiratory tract infection    Other orders  -     omeprazole (PriLOSEC) 20 mg delayed release capsule; Take 1 capsule by mouth daily  -     Discontinue: pyridoxine (B-6) 100 MG tablet; Take by mouth  -     PARoxetine (PAXIL) 30 mg tablet;           Subjective:      Patient ID: Delmy Hinton is a 77 y o  male  One-day history of stuffy nose scratchy throat and a nonproductive cough no aching his appetite has been okay no obvious fever or chills no sweating or dizziness no shortness of breath or pain in the chest   His wife urged to come in worrying that he might have the flu otherwise he has been active and well      Cough   Pertinent negatives include no chest pain, chills, ear pain, eye redness, fever, headaches, myalgias, postnasal drip, rash, rhinorrhea, sore throat, shortness of breath or wheezing  The following portions of the patient's history were reviewed and updated as appropriate: allergies, current medications, past family history, past medical history, past social history, past surgical history and problem list     Review of Systems   Constitutional: Negative for activity change, appetite change, chills, diaphoresis, fatigue, fever and unexpected weight change  HENT: Negative for congestion, dental problem, drooling, ear discharge, ear pain, facial swelling, hearing loss, nosebleeds, postnasal drip, rhinorrhea, sinus pain, sinus pressure, sneezing, sore throat, tinnitus, trouble swallowing and voice change  Eyes: Negative for photophobia, pain, discharge, redness, itching and visual disturbance  Respiratory: Positive for cough  Negative for apnea, choking, chest tightness, shortness of breath, wheezing and stridor      Cardiovascular: Negative for chest pain, palpitations and leg swelling  Gastrointestinal: Negative for abdominal distention, abdominal pain, anal bleeding, blood in stool, constipation, diarrhea, nausea, rectal pain and vomiting  Endocrine: Negative for cold intolerance, heat intolerance, polydipsia, polyphagia and polyuria  Genitourinary: Negative for decreased urine volume, difficulty urinating, dysuria, enuresis, flank pain, frequency, genital sores, hematuria and urgency  Musculoskeletal: Negative for arthralgias, back pain, gait problem, joint swelling, myalgias, neck pain and neck stiffness  Skin: Negative for color change, pallor, rash and wound  Neurological: Negative for dizziness, tremors, seizures, syncope, facial asymmetry, speech difficulty, weakness, light-headedness, numbness and headaches  Hematological: Negative for adenopathy  Does not bruise/bleed easily  Psychiatric/Behavioral: Negative for agitation, behavioral problems, confusion, decreased concentration, dysphoric mood, hallucinations, self-injury, sleep disturbance and suicidal ideas  The patient is not nervous/anxious and is not hyperactive  Objective:    /62 (BP Location: Left arm, Patient Position: Sitting)   Pulse 80   Temp 98 7 °F (37 1 °C)   Resp 14   Ht 5' 10" (1 778 m)   Wt 87 1 kg (192 lb)   BMI 27 55 kg/m²      Physical Exam   Constitutional: He is oriented to person, place, and time  He appears well-developed  HENT:   Head: Normocephalic  Right Ear: External ear normal    Left Ear: External ear normal    Mouth/Throat: Oropharynx is clear and moist    Eyes: Conjunctivae are normal  Pupils are equal, round, and reactive to light  Neck: Neck supple  No thyromegaly present  Cardiovascular: Normal rate, regular rhythm, normal heart sounds and intact distal pulses  Pulmonary/Chest: Effort normal and breath sounds normal    Abdominal: Soft  Bowel sounds are normal    Musculoskeletal: Normal range of motion  Neurological: He is alert and oriented to person, place, and time  He has normal reflexes  Skin: Skin is warm and dry

## 2018-03-01 DIAGNOSIS — I10 ESSENTIAL (PRIMARY) HYPERTENSION: ICD-10-CM

## 2018-03-01 DIAGNOSIS — E11.9 TYPE 2 DIABETES MELLITUS WITHOUT COMPLICATIONS (HCC): ICD-10-CM

## 2018-03-01 DIAGNOSIS — E78.5 HYPERLIPIDEMIA: ICD-10-CM

## 2018-03-06 ENCOUNTER — TRANSCRIBE ORDERS (OUTPATIENT)
Dept: ADMINISTRATIVE | Facility: HOSPITAL | Age: 68
End: 2018-03-06

## 2018-03-06 ENCOUNTER — APPOINTMENT (OUTPATIENT)
Dept: LAB | Facility: CLINIC | Age: 68
End: 2018-03-06
Payer: MEDICARE

## 2018-03-06 DIAGNOSIS — C18.3 MALIGNANT NEOPLASM OF HEPATIC FLEXURE (HCC): Primary | ICD-10-CM

## 2018-03-06 DIAGNOSIS — E11.9 TYPE 2 DIABETES MELLITUS WITHOUT COMPLICATIONS (HCC): ICD-10-CM

## 2018-03-06 DIAGNOSIS — E78.5 HYPERLIPIDEMIA: ICD-10-CM

## 2018-03-06 DIAGNOSIS — C18.3 MALIGNANT NEOPLASM OF HEPATIC FLEXURE (HCC): ICD-10-CM

## 2018-03-06 DIAGNOSIS — I10 ESSENTIAL (PRIMARY) HYPERTENSION: ICD-10-CM

## 2018-03-06 LAB
AFP-TM SERPL-MCNC: 3.3 NG/ML (ref 0.5–8)
ALBUMIN SERPL BCP-MCNC: 3.8 G/DL (ref 3.5–5)
ALP SERPL-CCNC: 37 U/L (ref 46–116)
ALT SERPL W P-5'-P-CCNC: 52 U/L (ref 12–78)
ANION GAP SERPL CALCULATED.3IONS-SCNC: 5 MMOL/L (ref 4–13)
AST SERPL W P-5'-P-CCNC: 35 U/L (ref 5–45)
BASOPHILS # BLD AUTO: 0.02 THOUSANDS/ΜL (ref 0–0.1)
BASOPHILS NFR BLD AUTO: 0 % (ref 0–1)
BILIRUB DIRECT SERPL-MCNC: 0.19 MG/DL (ref 0–0.2)
BILIRUB SERPL-MCNC: 0.58 MG/DL (ref 0.2–1)
BUN SERPL-MCNC: 14 MG/DL (ref 5–25)
CALCIUM SERPL-MCNC: 8.9 MG/DL (ref 8.3–10.1)
CEA SERPL-MCNC: <0.5 NG/ML (ref 0–3)
CHLORIDE SERPL-SCNC: 109 MMOL/L (ref 100–108)
CHOLEST SERPL-MCNC: 131 MG/DL (ref 50–200)
CO2 SERPL-SCNC: 27 MMOL/L (ref 21–32)
CREAT SERPL-MCNC: 1.15 MG/DL (ref 0.6–1.3)
CREAT UR-MCNC: 153 MG/DL
EOSINOPHIL # BLD AUTO: 0.22 THOUSAND/ΜL (ref 0–0.61)
EOSINOPHIL NFR BLD AUTO: 4 % (ref 0–6)
ERYTHROCYTE [DISTWIDTH] IN BLOOD BY AUTOMATED COUNT: 13.6 % (ref 11.6–15.1)
EST. AVERAGE GLUCOSE BLD GHB EST-MCNC: 114 MG/DL
FERRITIN SERPL-MCNC: 103 NG/ML (ref 8–388)
FOLATE SERPL-MCNC: 14.5 NG/ML (ref 3.1–17.5)
GFR SERPL CREATININE-BSD FRML MDRD: 64 ML/MIN/1.73SQ M
GLUCOSE P FAST SERPL-MCNC: 89 MG/DL (ref 65–99)
HBA1C MFR BLD: 5.6 % (ref 4.2–6.3)
HCT VFR BLD AUTO: 41.2 % (ref 36.5–49.3)
HDLC SERPL-MCNC: 39 MG/DL (ref 40–60)
HGB BLD-MCNC: 14.1 G/DL (ref 12–17)
IRON SERPL-MCNC: 142 UG/DL (ref 65–175)
LDH SERPL-CCNC: 214 U/L (ref 81–234)
LDLC SERPL CALC-MCNC: 72 MG/DL (ref 0–100)
LYMPHOCYTES # BLD AUTO: 1.27 THOUSANDS/ΜL (ref 0.6–4.47)
LYMPHOCYTES NFR BLD AUTO: 25 % (ref 14–44)
MCH RBC QN AUTO: 28.9 PG (ref 26.8–34.3)
MCHC RBC AUTO-ENTMCNC: 34.2 G/DL (ref 31.4–37.4)
MCV RBC AUTO: 84 FL (ref 82–98)
MICROALBUMIN UR-MCNC: 6.7 MG/L (ref 0–20)
MICROALBUMIN/CREAT 24H UR: 4 MG/G CREATININE (ref 0–30)
MONOCYTES # BLD AUTO: 0.47 THOUSAND/ΜL (ref 0.17–1.22)
MONOCYTES NFR BLD AUTO: 9 % (ref 4–12)
NEUTROPHILS # BLD AUTO: 3.19 THOUSANDS/ΜL (ref 1.85–7.62)
NEUTS SEG NFR BLD AUTO: 62 % (ref 43–75)
NRBC BLD AUTO-RTO: 0 /100 WBCS
PLATELET # BLD AUTO: 218 THOUSANDS/UL (ref 149–390)
PMV BLD AUTO: 9.9 FL (ref 8.9–12.7)
POTASSIUM SERPL-SCNC: 4.6 MMOL/L (ref 3.5–5.3)
PROT SERPL-MCNC: 7.1 G/DL (ref 6.4–8.2)
RBC # BLD AUTO: 4.88 MILLION/UL (ref 3.88–5.62)
RETICS # AUTO: NORMAL 10*3/UL (ref 14356–105094)
RETICS # CALC: 1.45 % (ref 0.37–1.87)
SODIUM SERPL-SCNC: 141 MMOL/L (ref 136–145)
TRIGL SERPL-MCNC: 102 MG/DL
VIT B12 SERPL-MCNC: 558 PG/ML (ref 100–900)
WBC # BLD AUTO: 5.19 THOUSAND/UL (ref 4.31–10.16)

## 2018-03-06 PROCEDURE — 80053 COMPREHEN METABOLIC PANEL: CPT

## 2018-03-06 PROCEDURE — 83615 LACTATE (LD) (LDH) ENZYME: CPT

## 2018-03-06 PROCEDURE — 82248 BILIRUBIN DIRECT: CPT

## 2018-03-06 PROCEDURE — 82607 VITAMIN B-12: CPT

## 2018-03-06 PROCEDURE — 36415 COLL VENOUS BLD VENIPUNCTURE: CPT

## 2018-03-06 PROCEDURE — 85045 AUTOMATED RETICULOCYTE COUNT: CPT

## 2018-03-06 PROCEDURE — 82746 ASSAY OF FOLIC ACID SERUM: CPT

## 2018-03-06 PROCEDURE — 82378 CARCINOEMBRYONIC ANTIGEN: CPT

## 2018-03-06 PROCEDURE — 82728 ASSAY OF FERRITIN: CPT

## 2018-03-06 PROCEDURE — 85025 COMPLETE CBC W/AUTO DIFF WBC: CPT

## 2018-03-06 PROCEDURE — 82105 ALPHA-FETOPROTEIN SERUM: CPT

## 2018-03-06 PROCEDURE — 82570 ASSAY OF URINE CREATININE: CPT

## 2018-03-06 PROCEDURE — 80061 LIPID PANEL: CPT

## 2018-03-06 PROCEDURE — 86038 ANTINUCLEAR ANTIBODIES: CPT

## 2018-03-06 PROCEDURE — 83540 ASSAY OF IRON: CPT

## 2018-03-06 PROCEDURE — 83036 HEMOGLOBIN GLYCOSYLATED A1C: CPT

## 2018-03-06 PROCEDURE — 82043 UR ALBUMIN QUANTITATIVE: CPT

## 2018-03-07 LAB — RYE IGE QN: NEGATIVE

## 2018-03-09 ENCOUNTER — OFFICE VISIT (OUTPATIENT)
Dept: INTERNAL MEDICINE CLINIC | Facility: CLINIC | Age: 68
End: 2018-03-09
Payer: MEDICARE

## 2018-03-09 VITALS
DIASTOLIC BLOOD PRESSURE: 74 MMHG | SYSTOLIC BLOOD PRESSURE: 130 MMHG | HEART RATE: 66 BPM | WEIGHT: 195.6 LBS | OXYGEN SATURATION: 97 % | HEIGHT: 70 IN | BODY MASS INDEX: 28 KG/M2

## 2018-03-09 DIAGNOSIS — E11.8 TYPE 2 DIABETES MELLITUS WITH COMPLICATION, WITHOUT LONG-TERM CURRENT USE OF INSULIN (HCC): ICD-10-CM

## 2018-03-09 DIAGNOSIS — F32.A DEPRESSION, UNSPECIFIED DEPRESSION TYPE: ICD-10-CM

## 2018-03-09 DIAGNOSIS — Z98.61 CAD S/P PERCUTANEOUS CORONARY ANGIOPLASTY: ICD-10-CM

## 2018-03-09 DIAGNOSIS — Z12.5 SCREENING FOR PROSTATE CANCER: ICD-10-CM

## 2018-03-09 DIAGNOSIS — E78.5 HYPERLIPIDEMIA, UNSPECIFIED HYPERLIPIDEMIA TYPE: ICD-10-CM

## 2018-03-09 DIAGNOSIS — C18.9 MALIGNANT NEOPLASM OF COLON, UNSPECIFIED PART OF COLON (HCC): Primary | ICD-10-CM

## 2018-03-09 DIAGNOSIS — I25.10 CAD S/P PERCUTANEOUS CORONARY ANGIOPLASTY: ICD-10-CM

## 2018-03-09 DIAGNOSIS — M35.9: ICD-10-CM

## 2018-03-09 PROCEDURE — 99214 OFFICE O/P EST MOD 30 MIN: CPT | Performed by: NURSE PRACTITIONER

## 2018-03-09 NOTE — PATIENT INSTRUCTIONS
Hypertension stable on current medication    Hyperlipidemia LDL at goal on statin continue same    History stage III colon cancer will now be establishing with [facility] Oncology information given    Health maintenance up-to-date    Type 2 diabetes mellitus under excellent control continue lifestyle modifications    Coronary artery disease stable without angina continue to modify risk factors    Anxiety stable on Paxil    Follow-up 6 months

## 2018-03-09 NOTE — PROGRESS NOTES
Assessment/Plan:    Hypertension stable on current medication    Hyperlipidemia LDL at goal on statin continue same    History stage III colon cancer will now be establishing with [facility] Oncology information given    Health maintenance up-to-date    Type 2 diabetes mellitus under excellent control continue lifestyle modifications    Coronary artery disease stable without angina continue to modify risk factors    Anxiety stable on Paxil    Follow-up 6 months     Diagnoses and all orders for this visit:    Malignant neoplasm of colon, unspecified part of colon [facility])  -     Ambulatory referral to Hematology / Oncology; Future    CAD S/P percutaneous coronary angioplasty    Depression, unspecified depression type    Hyperlipidemia, unspecified hyperlipidemia type  -     Lipid panel; Future  -     TSH, 3rd generation; Future    Type 2 diabetes mellitus with complication, without long-term current use of insulin (HCC)  -     CBC and differential; Future  -     Comprehensive metabolic panel; Future  -     HEMOGLOBIN A1C W/ EAG ESTIMATION; Future    Screening for prostate cancer  -     PSA; Future    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 [facility]  -     Varicella zoster antibody, IgG; Future          Subjective:      Patient ID: Jaylan Samuel is a 77 y o  male  Here to review labs and f/u mmp  No home bps but tolerating meds  Tolerating chol meds  No cp or sob  Needs to change oncology dx stage 3 colons ca 2014  Mood stable on paxil        The following portions of the patient's history were reviewed and updated as appropriate: allergies, current medications, past family history, past medical history, past social history, past surgical history and problem list     Review of Systems   Constitutional: Negative for appetite change, chills, diaphoresis, fatigue, fever and unexpected weight change  HENT: Negative for postnasal drip and sneezing  Eyes: Negative for visual disturbance     Respiratory: Negative for chest tightness and shortness of breath  Cardiovascular: Negative for chest pain, palpitations and leg swelling  Gastrointestinal: Negative for abdominal pain and blood in stool  Endocrine: Negative for cold intolerance, heat intolerance, polydipsia, polyphagia and polyuria  Genitourinary: Negative for difficulty urinating, dysuria, frequency and urgency  Musculoskeletal: Negative for arthralgias and myalgias  Skin: Negative for rash and wound  Neurological: Negative for dizziness, weakness, light-headedness and headaches  Hematological: Negative for adenopathy  Psychiatric/Behavioral: Negative for confusion, dysphoric mood and sleep disturbance  The patient is not nervous/anxious  Objective:      /74 (BP Location: Left arm, Patient Position: Sitting, Cuff Size: Standard)   Pulse 66   Ht 5' 10" (1 778 m)   Wt 88 7 kg (195 lb 9 6 oz) Comment: w/shoe  SpO2 97%   BMI 28 07 kg/m²          Physical Exam   Constitutional: He is oriented to person, place, and time  He appears well-developed  No distress  HENT:   Head: Normocephalic and atraumatic  Nose: Nose normal    Mouth/Throat: Oropharynx is clear and moist    Eyes: Conjunctivae and EOM are normal  Pupils are equal, round, and reactive to light  Neck: Normal range of motion  Neck supple  No JVD present  No tracheal deviation present  No thyromegaly present  Cardiovascular: Normal rate, regular rhythm, normal heart sounds and intact distal pulses  Exam reveals no gallop and no friction rub  No murmur heard  Pulmonary/Chest: Effort normal and breath sounds normal  No respiratory distress  He has no wheezes  He has no rales  Abdominal: Soft  Bowel sounds are normal  He exhibits no distension  There is no tenderness  Musculoskeletal: Normal range of motion  He exhibits no edema  Lymphadenopathy:     He has no cervical adenopathy  Neurological: He is alert and oriented to person, place, and time   No cranial nerve deficit  Skin: Skin is warm and dry  No rash noted  He is not diaphoretic  Psychiatric: He has a normal mood and affect   His behavior is normal  Judgment and thought content normal

## 2018-04-04 ENCOUNTER — OFFICE VISIT (OUTPATIENT)
Dept: CARDIOLOGY CLINIC | Facility: CLINIC | Age: 68
End: 2018-04-04
Payer: MEDICARE

## 2018-04-04 VITALS
DIASTOLIC BLOOD PRESSURE: 78 MMHG | HEART RATE: 58 BPM | OXYGEN SATURATION: 98 % | WEIGHT: 192.8 LBS | BODY MASS INDEX: 27.6 KG/M2 | HEIGHT: 70 IN | SYSTOLIC BLOOD PRESSURE: 134 MMHG

## 2018-04-04 DIAGNOSIS — E78.2 MIXED HYPERLIPIDEMIA: ICD-10-CM

## 2018-04-04 DIAGNOSIS — Z95.1 HX OF CABG: ICD-10-CM

## 2018-04-04 DIAGNOSIS — I10 ESSENTIAL HYPERTENSION: ICD-10-CM

## 2018-04-04 DIAGNOSIS — I25.10 CAD S/P PERCUTANEOUS CORONARY ANGIOPLASTY: Primary | ICD-10-CM

## 2018-04-04 DIAGNOSIS — Z98.61 CAD S/P PERCUTANEOUS CORONARY ANGIOPLASTY: Primary | ICD-10-CM

## 2018-04-04 PROCEDURE — 99214 OFFICE O/P EST MOD 30 MIN: CPT | Performed by: INTERNAL MEDICINE

## 2018-04-04 NOTE — PROGRESS NOTES
CARDIOLOGY OFFICE VISIT  [facility]  [address], [address], [address]  Tel: [phone]      NAME: Nathan Smith  AGE: 77 y o  SEX: male  : 1950   MRN: [id number]      Chief Complaint:  Chief Complaint   Patient presents with    Follow-up         History of Present Illness:  78-year-old male with past medical history significant for hypertension, hyperlipidemia, coronary artery disease status post PCI in  and 2 vessel CABG in 2008 at [facility]  CAD S/P PCI in , S/P CABG x 2 in  at [facility] - patient comes for follow-up  States he is doing well from cardiac stand point and denies chest pain / pressure, SOB, palpitations, lightheadedness, syncope, swelling feet, orthopnea, PND, claudication  HTN - taking his ACE-I and BB regularly  Denies lightheadedness, headache, medication side effects  HLP - on fenofibrate and simvastatin  Dr Penelope Castro closely monitors his blood work       Past Medical History:  Past Medical History:   Diagnosis Date    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[facility]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    Allergic rhinitis     Anemia     Anxiety     Atherosclerosis     CAD (coronary artery disease)     Colitis     Depression     Diabetes mellitus ([facility] )     Difficulty breathing     Gastritis     GERD (gastroesophageal reflux disease)     Hepatic cyst     Hyperlipidemia     Hypertension     IFG (impaired fasting glucose)     Myocardial infarction     9 YEARS AGO    Psoriasis          Past Surgical History:  Past Surgical History:   Procedure Laterality Date    ANGIOPLASTY      COLON SURGERY      CORONARY ARTERY BYPASS GRAFT      AR COLONOSCOPY FLX DX W/COLLJ SPEC WHEN PFRMD N/A 2017    Procedure: COLONOSCOPY;  Surgeon: Gertrude Kang MD;  [facility];   Service: Gastroenterology    AR RMVL HEATHER CTR VAD W/SUBQ PORT/ CTR/PRPH INSJ Left 2016    Procedure: REMOVAL VENOUS PORT (PORT-A-CATH);   Surgeon: Marycarmen Mcmahon MD;  [facility]: [facility];  Service: Colorectal         Family History:  Family History   Problem Relation Age of Onset    Colon cancer Family     Diverticulosis Family      colonic    Cancer Father          Social History:  Social History     Social History    Marital status: /Civil Union     Spouse name: N/A    Number of children: N/A    Years of education: N/A     Social History Main Topics    Smoking status: Never Smoker    Smokeless tobacco: Never Used    Alcohol use Yes      Comment: social    Drug use: No    Sexual activity: No     Other Topics Concern    None     Social History Narrative    No advance directives             Active Problems:  Patient Active Problem List   Diagnosis    Colon cancer ([facility] )    CAD S/P percutaneous coronary angioplasty    Depression    Gastritis    Hypertension    Hyperlipidemia         The following portions of the patient's history were reviewed and updated as appropriate: past medical history, past surgical history, past family history,  past social history, current medications, allergies and problem list       Review of Systems:  Constitutional: Denies fever, chills, fatigue  Eyes: Denies eye redness, eye discharge, double vision  ENT: Denies hearing loss, tinnitus, sneezing, nasal discharge, sore throat   Respiratory: Denies cough, expectoration, hemoptysis, shortness of breath  Cardiovascular: Denies chest pain, palpitations, orthopnea, PND, lower extremity swelling  Gastrointestinal: Denies abdominal pain, nausea, vomiting, hematemesis, diarrhea, bloody stools  Genito-Urinary: Denies dysuria, incontinence  Musculoskeletal: Denies back pain, joint pain, muscle pain  Neurologic: Denies confusion, lightheadedness, syncope, headache, focal weakness, sensory changes, seizures  Endocrine: Denies polyuria, polydipsia, temperature intolerance  Allergy and Immunology: Denies hives, insect bite sensitivity  Hematological and Lymphatic: Denies bleeding problems, swollen glands   Psychological: Denies depression, suicidal ideation, anxiety, panic  Dermatological: Denies pruritus, rash, skin lesion changes      Vitals:  Vitals:    04/04/18 1042   BP: 134/78   Pulse: 58   SpO2: 98%       Body mass index is 27 66 kg/m²  Weight (last 2 days)     Date/Time   Weight    04/04/18 1042  87 5 (192 8)                Physical Examination:  General: Patient is not in acute distress  Awake, alert, oriented in time, place and person  Responding to commands  Head: Normocephalic  Atraumatic  Eyes: Both pupils normal sized, round and reactive to light  Nonicteric  ENT: Normal external ear canals  Nares normal, no drainage  Lips and oral mucosa normal  Neck: Supple  JVP not raised  Trachea central  No thyromegaly  Lungs: Bilateral bronchovascular breath sounds with no crackles or rhonchi  Chest wall: Sternal scar+  Cardiovascular: RRR  S1 and S2 normal  No murmur, rub or gallop  Gastrointestinal: Abdomen soft, nontender  No guarding or rigidity  Liver and spleen not palpable  Bowel sounds present  Neurologic: Patient is awake, alert, oriented in time, place and person  Responding to command  Moving all extremities  Integumentary:  No skin rash  Lymphatic: No cervical lymphadenopathy  Back: Symmetric   No CVA tenderness  Extremities: No clubbing, cyanosis or edema      Laboratory Results:  CBC with diff:   Lab Results   Component Value Date    WBC 5 19 03/06/2018    WBC 6 1 03/16/2015    RBC 4 88 03/06/2018    RBC 4 91 03/16/2015    HGB 14 1 03/06/2018    HGB 14 7 03/16/2015    HCT 41 2 03/06/2018    HCT 42 0 03/16/2015    MCV 84 03/06/2018    MCV 85 03/16/2015    MCH 28 9 03/06/2018    MCH 29 9 03/16/2015    RDW 13 6 03/06/2018    RDW 12 0 03/16/2015     03/06/2018     03/16/2015       CMP:  Lab Results   Component Value Date    CREATININE 1 15 03/06/2018    CREATININE 1 1 03/16/2015    BUN 14 03/06/2018    BUN 12 2015     2018     (H) 2015    K 4 6 2018    K 4 0 2015     (H) 2018     (H) 2015    CO2 27 2018    CO2 29 4 2015    GLUCOSE 92 10/15/2016    GLUCOSE 104 2015    PROT 7 1 2018    PROT 6 8 2015    ALKPHOS 37 (L) 2018    ALKPHOS 89 2015    ALT 52 2018    ALT 43 2015    AST 35 2018    AST 23 2015    BILIDIR 0 19 2018       Lab Results   Component Value Date    HGBA1C 5 6 2018    HGBA1C 5 1 2015    MG 2 0 2014    PHOS 2 4 (L) 2014       No results found for: TROPONINI, CKMB, CKTOTAL    Lipid Profile:   Lab Results   Component Value Date    CHOL 131 2018    CHOL 137 2017    CHOL 149 10/15/2016     Lab Results   Component Value Date    HDL 39 (L) 2018    HDL 44 2017    HDL 49 10/15/2016     Lab Results   Component Value Date    LDLCALC 72 2018    LDLCALC 69 2017    LDLCALC 80 10/15/2016     Lab Results   Component Value Date    TRIG 102 2018    TRIG 120 2017    TRIG 101 10/15/2016       Cardiac testing:   Results for orders placed in visit on 14   Echo complete with contrast if indicated    Narrative [address]   Phone: [phone]   Transthoracic Echocardiogram   2D, M-MODE, AND COLOR DOPPLER   Study date:  26-Mar-2014   Patient: Carole Vasquez   MR number: [id number]   Account number: [de-identified]   : 1950   Age: 75 years   Gender: Male   Status: Outpatient   Location: [facility]  Physician office   Height: 69 in   Weight: 194 lb   BP: 128/ 78 mmHg   Indications: CAD   Diagnoses: 414 00 - COR ATH UNSP VSL NTV/GFT   Sonographer:  VINEET Gibbons   Interpreting Physician:  Quan Schafer MD   Primary Physician:  Mychal Davenport MD   Referring Physician:  Quan Schafer MD   Group:  [facility] [address] [place] [address] LEFT VENTRICLE:   Systolic function was normal  Ejection fraction was estimated to be 55 % plus   or minus 5 %  There were no regional wall motion abnormalities  Wall thickness was at the upper limits of normal    Left ventricular diastolic function parameters were normal    MITRAL VALVE:   There was trace regurgitation  TRICUSPID VALVE:   There was trace regurgitation  Pulmonary artery systolic pressure was within the normal range  PULMONIC VALVE:   There was trace regurgitation  HISTORY: PRIOR HISTORY: Risk factors: hypertension and medication-treated   hypercholesterolemia  Abnormal ECG  PRIOR PROCEDURES: Stent  Coronary bypass  PROCEDURE: The procedure was performed in the physician office suite  This was   a routine study  The transthoracic approach was used  The study included   2D, M-MODE, AND COLOR DOPPLER   MR number: [id number]   Account number: [de-identified]    ------ Page 2   Transthoracic Echocardiogram   complete 2D imaging, M-mode, and color Doppler  The heart rate was 58 bpm, at   the start of the study  Images were obtained from the parasternal, apical, and   subcostal acoustic windows  Image quality was adequate  LEFT VENTRICLE: Size was normal  Systolic function was normal  Ejection   fraction was estimated to be 55 % plus or minus 5 %  There were no regional   wall motion abnormalities  Wall thickness was at the upper limits of normal    DOPPLER: Left ventricular diastolic function parameters were normal    RIGHT VENTRICLE: The size was normal  Systolic function was normal  Wall   thickness was normal    LEFT ATRIUM: Size was at the upper limits of normal    RIGHT ATRIUM: Size was normal    MITRAL VALVE: Valve structure was normal  There was normal leaflet separation  DOPPLER: The transmitral velocity was within the normal range  There was no   evidence for stenosis  There was trace regurgitation  AORTIC VALVE: The valve was trileaflet   Leaflets exhibited normal thickness and   normal cuspal separation  DOPPLER: Transaortic velocity was within the normal   range  There was no evidence for stenosis  There was no significant   regurgitation  TRICUSPID VALVE: The valve structure was normal  There was normal leaflet   separation  DOPPLER: The transtricuspid velocity was within the normal range  There was no evidence for stenosis  There was trace regurgitation  Pulmonary   artery systolic pressure was within the normal range  PULMONIC VALVE: Leaflets exhibited normal thickness, no calcification, and   normal cuspal separation  DOPPLER: The transpulmonic velocity was within the   normal range  There was trace regurgitation  PERICARDIUM: There was no pericardial effusion  The pericardium was normal in   appearance  AORTA: The root exhibited normal size  SYSTEMIC VEINS: IVC: The inferior vena cava was not well visualized  SYSTEM MEASUREMENT TABLES   Unspecified Scan Mode   Aortic Root Diameter; End Systole;: 34 2 mm   Aortic Valve Cusp Separation; End Systole;: 21 1 mm   Gradient Pressure, Peak; Simplified Bernoulli; Antegrade Flow; Systole;: 5 4   mm[Hg] Gradient pressure, average; Simplified Bernoulli; Antegrade Flow;   Systole;: 3 1 mm[Hg]   Left atrial diameter; End Diastole;: 39 9 mm   2D, M-MODE, AND COLOR DOPPLER   MR number: [id number]   Account number: [de-identified]    ------ Page 3   Transthoracic Echocardiogram   Cardiac Output; Teichholz; Systole;: 3 55 L/min   Heart rate; Teichholz;: 60 /min   Interventricular Septum Diastolic Thickness; Teichholz; End Diastole;: 11 mm   Interventricular Septum Systolic Thickness; Teichholz; End Systole;: 12 2 mm   Left Ventricle Internal End Diastolic Dimension; Teichholz;: 49 9 mm   Left Ventricle Internal Systolic Dimension; Teichholz; End Systole;: 37 1 mm   Left Ventricle Posterior Wall Diastolic Thickness; Teichholz; End Diastole;:   9 1 mm Left Ventricle Posterior Wall Systolic Thickness;  Teichholz; End   Systole;: 11 6 mm Left Ventricular Ejection Fraction; Teichholz;: 50 3 %   Left Ventricular Fractional Shortening;: 25 7 %   Stroke volume; Teichholz; Systole;: 59 2 ml   DE-wave amplitude;: 17 7 mm   Gradient Pressure, Peak; Simplified Bernoulli; Antegrade Flow; Diastole;: 3 8   mm[Hg] Gradient pressure, average; Simplified Bernoulli; Antegrade Flow;   Diastole;: 1 mm[Hg]   Mitral Valve Area; Area by Pressure Half-Time; Systole;: 5 cm2   Mitral Valve E to A Ratio; Systole;: 1 35   Mitral Valve E-F Russell by M-Mode;: 81 2 mm/s   Mitral Valve EPSS, E wave;: 9 1 mm   Right Ventricular Systolic Pressure;: 19 1 mm[Hg]   [facility]   Prepared and electronically signed by   Darnell Monroy MD   Signed 27-Mar-2014 10:02:20        Medications:    Current Outpatient Prescriptions:     aspirin 81 MG tablet, Take 81 mg by mouth daily  , Disp: , Rfl:     enalapril (VASOTEC) 5 mg tablet, Take 1 tablet (5 mg total) by mouth daily, Disp: 90 tablet, Rfl: 3    fenofibrate (TRICOR) 145 mg tablet, Take 1 tablet (145 mg total) by mouth daily, Disp: 90 tablet, Rfl: 3    metoprolol succinate (TOPROL-XL) 25 mg 24 hr tablet, Take 25 mg by mouth daily, Disp: , Rfl:     omeprazole (PriLOSEC) 20 mg delayed release capsule, Take 1 capsule by mouth daily, Disp: , Rfl:     PARoxetine (PAXIL) 30 mg tablet, , Disp: , Rfl:     simvastatin (ZOCOR) 40 mg tablet, Take 1 tablet (40 mg total) by mouth daily at bedtime for 30 days, Disp: 30 tablet, Rfl: 5    ALPRAZolam (XANAX) 0 25 mg tablet, Take 0 25 mg by mouth 3 (three) times a day as needed for anxiety  , Disp: , Rfl:     clotrimazole-betamethasone (LOTRISONE) 1-0 05 % cream, Apply topically, Disp: , Rfl:     nitroglycerin (NITROSTAT) 0 4 mg SL tablet, Place 0 4 mg under the tongue every 5 (five) minutes as needed for chest pain, Disp: , Rfl:       Allergies:   Allergies   Allergen Reactions    Penicillins Hives         Assessment and Plan:  CAD s/p PCI in 2007 and 2 vessel CABG in Feb 2008 at [facility] echo (Mar 2014) - EF 55+/-5%, no significant valvular pathology  Exercise nuclear stress test (Mar 2014) - moderate sized fixed defect in the basal and mid inferior wall  No evidence of reversible ischemia  Patient to continue his aspirin, enalapril, metoprolol, simvastatin, fenofibrate, prn S/L NTG    HTN - Blood pressure stable  Cont BB and Enalapril    HLP - Patient on simvastatin, fenifibrate  Continue aggressive risk factor modification and therapeutic lifestyle changes  Low salt, low calorie, low fat, low cholesterol diet with regular exercise and to optimize weight  Previous studies were reviewed with the patient in detail  Medications were reviewed  Discussed concepts of atherosclerosis, including signs and symptoms of cardiac disease  Safety measures were reviewed  Questions were entertained and answered  Patient was advised to report any problem(s) requiring medical attention  Return for follow up visit as scheduled or earlier if needed  Thank you for allowing me to participate in the care and evaluation of your patient  Should you have any questions, please feel free to contact me         Iris Raymond MD  2/9/7124,67:31 AM

## 2018-07-03 DIAGNOSIS — I10 ESSENTIAL HYPERTENSION: Primary | ICD-10-CM

## 2018-07-03 RX ORDER — METOPROLOL SUCCINATE 25 MG/1
12.5 TABLET, EXTENDED RELEASE ORAL DAILY
Qty: 45 TABLET | Refills: 3 | Status: SHIPPED | OUTPATIENT
Start: 2018-07-03 | End: 2019-07-01 | Stop reason: SDUPTHER

## 2018-09-07 ENCOUNTER — APPOINTMENT (OUTPATIENT)
Dept: LAB | Facility: CLINIC | Age: 68
End: 2018-09-07
Payer: MEDICARE

## 2018-09-07 ENCOUNTER — TRANSCRIBE ORDERS (OUTPATIENT)
Dept: ADMINISTRATIVE | Facility: HOSPITAL | Age: 68
End: 2018-09-07

## 2018-09-07 DIAGNOSIS — E11.8 TYPE 2 DIABETES MELLITUS WITH COMPLICATION, WITHOUT LONG-TERM CURRENT USE OF INSULIN (HCC): ICD-10-CM

## 2018-09-07 DIAGNOSIS — M35.9: ICD-10-CM

## 2018-09-07 DIAGNOSIS — C18.3 MALIGNANT NEOPLASM OF HEPATIC FLEXURE (HCC): ICD-10-CM

## 2018-09-07 DIAGNOSIS — E78.5 HYPERLIPIDEMIA, UNSPECIFIED HYPERLIPIDEMIA TYPE: ICD-10-CM

## 2018-09-07 DIAGNOSIS — Z12.5 SCREENING FOR PROSTATE CANCER: ICD-10-CM

## 2018-09-07 DIAGNOSIS — C18.3 MALIGNANT NEOPLASM OF HEPATIC FLEXURE (HCC): Primary | ICD-10-CM

## 2018-09-07 LAB
ALBUMIN SERPL BCP-MCNC: 3.8 G/DL (ref 3.5–5)
ALP SERPL-CCNC: 38 U/L (ref 46–116)
ALT SERPL W P-5'-P-CCNC: 37 U/L (ref 12–78)
ANION GAP SERPL CALCULATED.3IONS-SCNC: 7 MMOL/L (ref 4–13)
AST SERPL W P-5'-P-CCNC: 27 U/L (ref 5–45)
BASOPHILS # BLD AUTO: 0.03 THOUSANDS/ΜL (ref 0–0.1)
BASOPHILS NFR BLD AUTO: 1 % (ref 0–1)
BILIRUB DIRECT SERPL-MCNC: 0.19 MG/DL (ref 0–0.2)
BILIRUB SERPL-MCNC: 0.7 MG/DL (ref 0.2–1)
BUN SERPL-MCNC: 15 MG/DL (ref 5–25)
CALCIUM SERPL-MCNC: 9 MG/DL (ref 8.3–10.1)
CEA SERPL-MCNC: <0.5 NG/ML (ref 0–3)
CHLORIDE SERPL-SCNC: 107 MMOL/L (ref 100–108)
CHOLEST SERPL-MCNC: 141 MG/DL (ref 50–200)
CO2 SERPL-SCNC: 27 MMOL/L (ref 21–32)
CREAT SERPL-MCNC: 1.09 MG/DL (ref 0.6–1.3)
EOSINOPHIL # BLD AUTO: 0.21 THOUSAND/ΜL (ref 0–0.61)
EOSINOPHIL NFR BLD AUTO: 4 % (ref 0–6)
ERYTHROCYTE [DISTWIDTH] IN BLOOD BY AUTOMATED COUNT: 13 % (ref 11.6–15.1)
EST. AVERAGE GLUCOSE BLD GHB EST-MCNC: 114 MG/DL
GFR SERPL CREATININE-BSD FRML MDRD: 68 ML/MIN/1.73SQ M
GLUCOSE P FAST SERPL-MCNC: 98 MG/DL (ref 65–99)
HBA1C MFR BLD: 5.6 % (ref 4.2–6.3)
HCT VFR BLD AUTO: 42.4 % (ref 36.5–49.3)
HDLC SERPL-MCNC: 35 MG/DL (ref 40–60)
HGB BLD-MCNC: 14.1 G/DL (ref 12–17)
IMM GRANULOCYTES # BLD AUTO: 0.02 THOUSAND/UL (ref 0–0.2)
IMM GRANULOCYTES NFR BLD AUTO: 0 % (ref 0–2)
LDLC SERPL CALC-MCNC: 74 MG/DL (ref 0–100)
LYMPHOCYTES # BLD AUTO: 0.81 THOUSANDS/ΜL (ref 0.6–4.47)
LYMPHOCYTES NFR BLD AUTO: 15 % (ref 14–44)
MCH RBC QN AUTO: 29 PG (ref 26.8–34.3)
MCHC RBC AUTO-ENTMCNC: 33.3 G/DL (ref 31.4–37.4)
MCV RBC AUTO: 87 FL (ref 82–98)
MONOCYTES # BLD AUTO: 0.58 THOUSAND/ΜL (ref 0.17–1.22)
MONOCYTES NFR BLD AUTO: 11 % (ref 4–12)
NEUTROPHILS # BLD AUTO: 3.6 THOUSANDS/ΜL (ref 1.85–7.62)
NEUTS SEG NFR BLD AUTO: 69 % (ref 43–75)
NONHDLC SERPL-MCNC: 106 MG/DL
NRBC BLD AUTO-RTO: 0 /100 WBCS
PLATELET # BLD AUTO: 185 THOUSANDS/UL (ref 149–390)
PMV BLD AUTO: 10.4 FL (ref 8.9–12.7)
POTASSIUM SERPL-SCNC: 3.8 MMOL/L (ref 3.5–5.3)
PROT SERPL-MCNC: 6.7 G/DL (ref 6.4–8.2)
PSA SERPL-MCNC: 1.1 NG/ML (ref 0–4)
RBC # BLD AUTO: 4.87 MILLION/UL (ref 3.88–5.62)
SODIUM SERPL-SCNC: 141 MMOL/L (ref 136–145)
TRIGL SERPL-MCNC: 162 MG/DL
TSH SERPL DL<=0.05 MIU/L-ACNC: 1.57 UIU/ML (ref 0.36–3.74)
WBC # BLD AUTO: 5.25 THOUSAND/UL (ref 4.31–10.16)

## 2018-09-07 PROCEDURE — 84443 ASSAY THYROID STIM HORMONE: CPT

## 2018-09-07 PROCEDURE — 82248 BILIRUBIN DIRECT: CPT

## 2018-09-07 PROCEDURE — 36415 COLL VENOUS BLD VENIPUNCTURE: CPT

## 2018-09-07 PROCEDURE — 86787 VARICELLA-ZOSTER ANTIBODY: CPT

## 2018-09-07 PROCEDURE — 85025 COMPLETE CBC W/AUTO DIFF WBC: CPT

## 2018-09-07 PROCEDURE — G0103 PSA SCREENING: HCPCS

## 2018-09-07 PROCEDURE — 83036 HEMOGLOBIN GLYCOSYLATED A1C: CPT

## 2018-09-07 PROCEDURE — 82378 CARCINOEMBRYONIC ANTIGEN: CPT

## 2018-09-07 PROCEDURE — 80053 COMPREHEN METABOLIC PANEL: CPT

## 2018-09-07 PROCEDURE — 80061 LIPID PANEL: CPT

## 2018-09-10 ENCOUNTER — OFFICE VISIT (OUTPATIENT)
Dept: INTERNAL MEDICINE CLINIC | Facility: CLINIC | Age: 68
End: 2018-09-10
Payer: MEDICARE

## 2018-09-10 VITALS
HEIGHT: 70 IN | WEIGHT: 196.2 LBS | BODY MASS INDEX: 28.09 KG/M2 | RESPIRATION RATE: 14 BRPM | HEART RATE: 56 BPM | DIASTOLIC BLOOD PRESSURE: 64 MMHG | SYSTOLIC BLOOD PRESSURE: 122 MMHG

## 2018-09-10 DIAGNOSIS — Z98.61 CAD S/P PERCUTANEOUS CORONARY ANGIOPLASTY: ICD-10-CM

## 2018-09-10 DIAGNOSIS — C18.9 MALIGNANT NEOPLASM OF COLON, UNSPECIFIED PART OF COLON (HCC): Primary | ICD-10-CM

## 2018-09-10 DIAGNOSIS — E11.8 TYPE 2 DIABETES MELLITUS WITH COMPLICATION, WITHOUT LONG-TERM CURRENT USE OF INSULIN (HCC): ICD-10-CM

## 2018-09-10 DIAGNOSIS — I25.10 CAD S/P PERCUTANEOUS CORONARY ANGIOPLASTY: ICD-10-CM

## 2018-09-10 DIAGNOSIS — Z11.59 NEED FOR HEPATITIS C SCREENING TEST: ICD-10-CM

## 2018-09-10 DIAGNOSIS — I10 ESSENTIAL HYPERTENSION: ICD-10-CM

## 2018-09-10 DIAGNOSIS — Z95.1 HX OF CABG: ICD-10-CM

## 2018-09-10 DIAGNOSIS — E78.2 MIXED HYPERLIPIDEMIA: ICD-10-CM

## 2018-09-10 DIAGNOSIS — R21 RASH: ICD-10-CM

## 2018-09-10 DIAGNOSIS — K21.9 GASTROESOPHAGEAL REFLUX DISEASE WITHOUT ESOPHAGITIS: ICD-10-CM

## 2018-09-10 LAB
LEFT EYE DIABETIC RETINOPATHY: ABNORMAL
LEFT EYE IMAGE QUALITY: ABNORMAL
RIGHT EYE DIABETIC RETINOPATHY: ABNORMAL
RIGHT EYE IMAGE QUALITY: ABNORMAL
SEVERITY (EYE EXAM): ABNORMAL
VZV IGG SER IA-ACNC: NORMAL

## 2018-09-10 PROCEDURE — 92250 FUNDUS PHOTOGRAPHY W/I&R: CPT | Performed by: NURSE PRACTITIONER

## 2018-09-10 PROCEDURE — 99214 OFFICE O/P EST MOD 30 MIN: CPT | Performed by: NURSE PRACTITIONER

## 2018-09-10 PROCEDURE — G0438 PPPS, INITIAL VISIT: HCPCS | Performed by: NURSE PRACTITIONER

## 2018-09-10 RX ORDER — CLOTRIMAZOLE AND BETAMETHASONE DIPROPIONATE 10; .64 MG/G; MG/G
CREAM TOPICAL 2 TIMES DAILY
Qty: 30 G | Refills: 2 | Status: SHIPPED | OUTPATIENT
Start: 2018-09-10 | End: 2019-09-03 | Stop reason: ALTCHOICE

## 2018-09-10 RX ORDER — CLOTRIMAZOLE AND BETAMETHASONE DIPROPIONATE 10; .64 MG/G; MG/G
CREAM TOPICAL 2 TIMES DAILY
Qty: 30 G | Refills: 0 | Status: CANCELLED | OUTPATIENT
Start: 2018-09-10

## 2018-09-10 RX ORDER — OMEPRAZOLE 20 MG/1
20 CAPSULE, DELAYED RELEASE ORAL DAILY
Qty: 30 CAPSULE | Refills: 2 | Status: SHIPPED | OUTPATIENT
Start: 2018-09-10 | End: 2019-01-28 | Stop reason: SDUPTHER

## 2018-09-10 NOTE — PROGRESS NOTES
Assessment and Plan:    Problem List Items Addressed This Visit     Col[facility] cancer ([facility] ) - Primary    Relevant Orders    CBC and differential    CAD S/P percutaneous coronary angioplasty    Hypertension    Relevant Orders    Comprehensive metabolic panel    Hyperlipidemia    Relevant Orders    Lipid panel    TSH, 3rd generation    Hx of CABG    Type 2 diabetes mellitus with complication, without long-term current use of insulin (HCC)    Relevant Orders    Hemoglobin A1C    POCT diabetic eye exam      Other Visit Diagnoses     Rash        Relevant Medications    clotrimazole-betamethasone (LOTRISONE) 1-0 05 % cream    Gastroesophageal reflux disease without esophagitis        Relevant Medications    omeprazole (PriLOSEC) 20 mg delayed release capsule    Need for hepatitis C screening test        Relevant Orders    Hepatitis C antibody        Health Maintenance Due   Topic Date Due    Medicare Annual Wellness Visit (AWV)  1950    DTaP,Tdap,and Td Vaccines (1 - Tdap) 01/01/1969    Pneumococcal PPSV23/PCV13 65+ Years / High and Highest Risk (1 of 2 - PCV13) 01/01/2013    DM Eye Exam  06/13/2017    INFLUENZA VACCINE  09/01/2018         HPI:  Adri Xiong is a 77 y o  male here for his Subsequent Wellness Visit      Patient Active Problem List   Diagnosis    [facility] ([facility]    CAD S/P percutaneous coronary angioplasty    Depression    Gastritis    Hypertension    Hyperlipidemia    Hx of CABG    Type 2 diabetes mellitus with complication, without long-term current use of insulin (HCC)     Past Medical History:   Diagnosis Date    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[facility]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    Allergic rhinitis     Anemia     Anxiety     Atherosclerosis     CAD (coronary artery disease)     Colitis     Depression     Diabetes mellitus (HCC)     Difficulty breathing     Gastritis     GERD (gastroesophageal reflux disease)     Hepatic cyst     Hyperlipidemia     Hypertension     IFG (impaired fasting glucose)  Myocardial infarction ([facility] )     9 YEARS AGO    Psoriasis      Past Surgical History:   Procedure Laterality Date    ANGIOPLASTY      COLON SURGERY      CORONARY ARTERY BYPASS GRAFT      SD COLONOSCOPY FLX DX W/COLLJ SPEC WHEN PFRMD N/A 2/21/2017    Procedure: COLONOSCOPY;  Surgeon: Barbara Rodas MD;  Location: [facility]; Service: Gastroenterology    SD RMVL HEATHER CTR VAD W/SUBQ PORT/ CTR/PRPH INSJ Left 5/13/2016    Procedure: REMOVAL VENOUS PORT (PORT-A-CATH); Surgeon: Cass Rodgers MD;  [facility];  Service: Colorectal     Family History   Problem Relation Age of Onset    Colon cancer Family     Diverticulosis Family         colonic    Cancer Father      History   Smoking Status    Never Smoker   Smokeless Tobacco    Never Used     History   Alcohol Use    Yes     Comment: social      History   Drug Use No       Current Outpatient Prescriptions   Medication Sig Dispense Refill    aspirin 81 MG tablet Take 81 mg by mouth daily   clotrimazole-betamethasone (LOTRISONE) 1-0 05 % cream Apply topically 2 (two) times a day 30 g 2    enalapril (VASOTEC) 5 mg tablet Take 1 tablet (5 mg total) by mouth daily 90 tablet 3    fenofibrate (TRICOR) 145 mg tablet Take 1 tablet (145 mg total) by mouth daily 90 tablet 3    metoprolol succinate (TOPROL-XL) 25 mg 24 hr tablet Take 0 5 tablets (12 5 mg total) by mouth daily 45 tablet 3    nitroglycerin (NITROSTAT) 0 4 mg SL tablet Place 0 4 mg under the tongue every 5 (five) minutes as needed for chest pain      omeprazole (PriLOSEC) 20 mg delayed release capsule Take 1 capsule (20 mg total) by mouth daily 30 capsule 2    simvastatin (ZOCOR) 40 mg tablet Take 1 tablet (40 mg total) by mouth daily at bedtime for 30 days 30 tablet 5     No current facility-administered medications for this visit        Allergies   Allergen Reactions    Penicillins Hives     Immunization History   Administered Date(s) Administered    Influenza TIV (IM) 1950    Pneumococcal Polysaccharide PPV23 1950    Tdap 1950    Zoster 1950       Patient Care Team:  Elena Acuña MD as PCP - Hannah Tavares MD    Medicare Screening Tests and Risk Assessments:  Farshad Mills is here for his Subsequent Wellness visit  Last Medicare Wellness visit information reviewed, patient interviewed, no change since last AWV  Last Medicare Wellness visit information reviewed, patient interviewed and updates made to the record today  Health Risk Assessment:  Patient rates overall health as very good  Patient feels that their physical health rating is Slightly better  Eyesight was rated as Slightly worse  Hearing was rated as Slightly worse  Patient feels that their emotional and mental health rating is Much better  Pain experienced by patient in the last 7 days has been None  Emotional/Mental Health:  Patient has been feeling nervous/anxious  PHQ-9 Depression Screening:    Frequency of the following problems over the past two weeks:      1  Little interest or pleasure in doing things: 0 - not at all      2  Feeling down, depressed, or hopeless: 0 - not at all  PHQ-2 Score: 0          Broken Bones/Falls: Fall Risk Assessment:    In the past year, patient has experienced: No history of falling in past year          Bladder/Bowel:  Patient has not leaked urine accidently in the last six months  Patient reports no loss of bowel control  Immunizations:  Patient has not had a flu vaccination within the last year  Patient has not received a pneumonia shot  Patient has not received a shingles shot  Home Safety:  Patient does not have trouble with stairs inside or outside of their home  Patient currently reports that there are no safety hazards present in home, working smoke alarms, no working carbon monoxide detectors        Preventative Screenings:   no prostate cancer screen performed, colon cancer screen completed, cholesterol screen completed, no glaucoma eye exam completed    Nutrition:  Current diet: Low Cholesterol and No Added Salt with servings of the following:    Medications:  Patient is not currently taking any over-the-counter supplements  Patient is able to manage medications  Lifestyle Choices:  Patient reports no tobacco use  Patient reports alcohol use  Alcohol use per week: 3  Patient drives a vehicle  Patient wears seat belt  Activities of Daily Living:  Can get out of bed by his or her self, able to dress self, able to make own meals, able to do own shopping, able to bathe self, can do own laundry/housekeeping, can manage own money, pay bills and track expenses    Previous Hospitalizations:  No hospitalization or ED visit in past 12 months        Advanced Directives:  Patient has not decided on power of   Patient has not completed advanced directive  Preventative Screening/Counseling:      Cardiovascular:      General: Screening Current      Counseling: Healthy Weight          Diabetes:      General: Screening Current      Counseling: Healthy Diet, Healthy Weight and Improve Physical Activity          Colorectal Cancer:      General: Screening Current          Prostate Cancer:      General: Screening Current          Osteoporosis:      General: Screening Not Indicated          AAA:      General: Screening Not Indicated          Glaucoma:      General: Risks and Benefits Discussed      Referrals: Ophthalmology          HIV:      General: Screening Not Indicated          Hepatitis C:      General: Risks and Benefits Discussed      Counseling: has received general HCV counseling        Advanced Directives:   Patient has no living will for healthcare, Information on ACP and/or AD not provided  5 wishes given  End of life assessment reviewed with patient  Provider agrees with end of life decisions

## 2018-09-10 NOTE — PATIENT INSTRUCTIONS
Hypertension stable on current medication    Hyperlipidemia LDL at goal on statin continue same    Coronary artery disease status post CABG stable without angina continue to modify risk factors    Type 2 diabetes mellitus under excellent control dietary modifications continue same  I risk completed today    Declines flu shot and pneumonia shot    Psoriasis versus eczema does not really bother him so discontinue to monitor does have rash in the groin that may be same okay to continue Lotrisone    Check hepatitis-c screening with next set of labs    History of colon cancer he is every 3 years for colonoscopy last was 2017

## 2018-09-10 NOTE — PROGRESS NOTES
Assessment/Plan:    Patient Instructions   Hypertension stable on current medication    Hyperlipidemia LDL at goal on statin continue same    Coronary artery disease status post CABG stable without angina continue to modify risk factors    Type 2 diabetes mellitus under excellent control dietary modifications continue same  I risk completed today    Declines flu shot and pneumonia shot    Psoriasis versus eczema does not really bother him so discontinue to monitor does have rash in the groin that may be same okay to continue Lotrisone    Check hepatitis-c screening with next set of labs    History of colon cancer he is every 3 years for colonoscopy last was 2017         Diagnoses and all orders for this visit:    Malignant neoplasm of colon, unspecified part of colon ([facility] [address] )  -     CBC and differential; Future    CAD S/P percutaneous coronary angioplasty    Essential hypertension  -     Comprehensive metabolic panel; Future    Mixed hyperlipidemia  -     Lipid panel; Future  -     TSH, 3rd generation; Future    Hx of CABG    Rash  -     clotrimazole-betamethasone (LOTRISONE) 1-0 05 % cream; Apply topically 2 (two) times a day    Gastroesophageal reflux disease without esophagitis  -     omeprazole (PriLOSEC) 20 mg delayed release capsule; Take 1 capsule (20 mg total) by mouth daily    Need for hepatitis C screening test  -     Hepatitis C antibody; Future    Type 2 diabetes mellitus with complication, without long-term current use of insulin (HCC)  -     Hemoglobin A1C; Future  -     POCT diabetic eye exam    Other orders  -     Cancel: clotrimazole-betamethasone (LOTRISONE) 1-0 05 % cream; Apply topically 2 (two) times a day         Subjective:      Patient ID: Gema Valadez is a 77 y o  male    Feeling well  No new complaints  No home bps  Anxiety stable  Likes to eats his sweets but very active at work  colonscopy now q3 years             Current Outpatient Prescriptions:     aspirin 81 MG tablet, Take 81 mg by mouth daily  , Disp: , Rfl:     clotrimazole-betamethasone (LOTRISONE) 1-0 05 % cream, Apply topically 2 (two) times a day, Disp: 30 g, Rfl: 2    enalapril (VASOTEC) 5 mg tablet, Take 1 tablet (5 mg total) by mouth daily, Disp: 90 tablet, Rfl: 3    fenofibrate (TRICOR) 145 mg tablet, Take 1 tablet (145 mg total) by mouth daily, Disp: 90 tablet, Rfl: 3    metoprolol succinate (TOPROL-XL) 25 mg 24 hr tablet, Take 0 5 tablets (12 5 mg total) by mouth daily, Disp: 45 tablet, Rfl: 3    nitroglycerin (NITROSTAT) 0 4 mg SL tablet, Place 0 4 mg under the tongue every 5 (five) minutes as needed for chest pain, Disp: , Rfl:     omeprazole (PriLOSEC) 20 mg delayed release capsule, Take 1 capsule (20 mg total) by mouth daily, Disp: 30 capsule, Rfl: 2    simvastatin (ZOCOR) 40 mg tablet, Take 1 tablet (40 mg total) by mouth daily at bedtime for 30 days, Disp: 30 tablet, Rfl: 5    Recent Results (from the past 1008 hour(s))   CBC and differential    Collection Time: 09/07/18  9:40 AM   Result Value Ref Range    WBC 5 25 4 31 - 10 16 Thousand/uL    RBC 4 87 3 88 - 5 62 Million/uL    Hemoglobin 14 1 12 0 - 17 0 g/dL    Hematocrit 42 4 36 5 - 49 3 %    MCV 87 82 - 98 fL    MCH 29 0 26 8 - 34 3 pg    MCHC 33 3 31 4 - 37 4 g/dL    RDW 13 0 11 6 - 15 1 %    MPV 10 4 8 9 - 12 7 fL    Platelets 409 925 - 314 Thousands/uL    nRBC 0 /100 WBCs    Neutrophils Relative 69 43 - 75 %    Immat GRANS % 0 0 - 2 %    Lymphocytes Relative 15 14 - 44 %    Monocytes Relative 11 4 - 12 %    Eosinophils Relative 4 0 - 6 %    Basophils Relative 1 0 - 1 %    Neutrophils Absolute 3 60 1 85 - 7 62 Thousands/µL    Immature Grans Absolute 0 02 0 00 - 0 20 Thousand/uL    Lymphocytes Absolute 0 81 0 60 - 4 47 Thousands/µL    Monocytes Absolute 0 58 0 17 - 1 22 Thousand/µL    Eosinophils Absolute 0 21 0 00 - 0 61 Thousand/µL    Basophils Absolute 0 03 0 00 - 0 10 Thousands/µL   Comprehensive metabolic panel    Collection Time: 09/07/18  9:40 AM   Result Value Ref Range    Sodium 141 136 - 145 mmol/L    Potassium 3 8 3 5 - 5 3 mmol/L    Chloride 107 100 - 108 mmol/L    CO2 27 21 - 32 mmol/L    ANION GAP 7 4 - 13 mmol/L    BUN 15 5 - 25 mg/dL    Creatinine 1 09 0 60 - 1 30 mg/dL    Glucose, Fasting 98 65 - 99 mg/dL    Calcium 9 0 8 3 - 10 1 mg/dL    AST 27 5 - 45 U/L    ALT 37 12 - 78 U/L    Alkaline Phosphatase 38 (L) 46 - 116 U/L    Total Protein 6 7 6 4 - 8 2 g/dL    Albumin 3 8 3 5 - 5 0 g/dL    Total Bilirubin 0 70 0 20 - 1 00 mg/dL    eGFR 68 ml/min/1 73sq m   Lipid panel    Collection Time: 09/07/18  9:40 AM   Result Value Ref Range    Cholesterol 141 50 - 200 mg/dL    Triglycerides 162 (H) <=150 mg/dL    HDL, Direct 35 (L) 40 - 60 mg/dL    LDL Calculated 74 0 - 100 mg/dL    Non-HDL-Chol (CHOL-HDL) 106 mg/dl   HEMOGLOBIN A1C W/ EAG ESTIMATION    Collection Time: 09/07/18  9:40 AM   Result Value Ref Range    Hemoglobin A1C 5 6 4 2 - 6 3 %     mg/dl   PSA    Collection Time: 09/07/18  9:40 AM   Result Value Ref Range    PSA 1 1 0 0 - 4 0 ng/mL   TSH, 3rd generation    Collection Time: 09/07/18  9:40 AM   Result Value Ref Range    TSH 3RD GENERATON 1 570 0 358 - 3 740 uIU/mL   CEA    Collection Time: 09/07/18  9:40 AM   Result Value Ref Range    CEA <0 5 0 0 - 3 0 ng/mL   Bilirubin, direct    Collection Time: 09/07/18  9:40 AM   Result Value Ref Range    Bilirubin, Direct 0 19 0 00 - 0 20 mg/dL       The following portions of the patient's history were reviewed and updated as appropriate: allergies, current medications, past family history, past medical history, past social history, past surgical history and problem list      Review of Systems   Constitutional: Negative for appetite change, chills, diaphoresis, fatigue, fever and unexpected weight change  HENT: Negative for postnasal drip and sneezing  Eyes: Negative for visual disturbance  Respiratory: Negative for chest tightness and shortness of breath      Cardiovascular: Negative for chest pain, palpitations and leg swelling  Gastrointestinal: Negative for abdominal pain and blood in stool  Endocrine: Negative for cold intolerance, heat intolerance, polydipsia, polyphagia and polyuria  Genitourinary: Negative for difficulty urinating, dysuria, frequency and urgency  Musculoskeletal: Negative for arthralgias and myalgias  Skin: Negative for rash and wound  Neurological: Negative for dizziness, weakness, light-headedness and headaches  Hematological: Negative for adenopathy  Psychiatric/Behavioral: Negative for confusion, dysphoric mood and sleep disturbance  The patient is not nervous/anxious  Objective:      /64   Pulse 56   Resp 14   Ht 5' 10" (1 778 m)   Wt 89 kg (196 lb 3 2 oz)   BMI 28 15 kg/m²        Physical Exam   Constitutional: He is oriented to person, place, and time  He appears well-developed  No distress  HENT:   Head: Normocephalic and atraumatic  Nose: Nose normal    Mouth/Throat: Oropharynx is clear and moist    Eyes: Conjunctivae and EOM are normal  Pupils are equal, round, and reactive to light  Neck: Normal range of motion  Neck supple  No JVD present  No tracheal deviation present  No thyromegaly present  Cardiovascular: Normal rate, regular rhythm, normal heart sounds and intact distal pulses  Exam reveals no gallop and no friction rub  No murmur heard  Pulmonary/Chest: Effort normal and breath sounds normal  No respiratory distress  He has no wheezes  He has no rales  Abdominal: Soft  Bowel sounds are normal  He exhibits no distension  There is no tenderness  Musculoskeletal: Normal range of motion  He exhibits no edema  Lymphadenopathy:     He has no cervical adenopathy  Neurological: He is alert and oriented to person, place, and time  No cranial nerve deficit  Skin: Skin is warm and dry  No rash noted  He is not diaphoretic  Psychiatric: He has a normal mood and affect   His behavior is normal  Judgment and thought content normal

## 2018-09-11 ENCOUNTER — TELEPHONE (OUTPATIENT)
Dept: INTERNAL MEDICINE CLINIC | Facility: CLINIC | Age: 68
End: 2018-09-11

## 2018-09-11 NOTE — TELEPHONE ENCOUNTER
----- Message from Tez Corral, [address]  sent at 9/11/2018  1:40 PM EDT -----  Let him know he does have abnormalities on his eye exam- he needs to see and eye doctor- we recommend [facility]

## 2018-09-11 NOTE — PROGRESS NOTES
Let him know he does have abnormalities on his eye exam- he needs to see and eye doctor- we recommend [facility]

## 2018-09-12 ENCOUNTER — TELEPHONE (OUTPATIENT)
Dept: INTERNAL MEDICINE CLINIC | Facility: CLINIC | Age: 68
End: 2018-09-12

## 2018-09-21 ENCOUNTER — TELEPHONE (OUTPATIENT)
Dept: INTERNAL MEDICINE CLINIC | Facility: CLINIC | Age: 68
End: 2018-09-21

## 2018-09-21 DIAGNOSIS — R21 RASH: Primary | ICD-10-CM

## 2018-09-21 RX ORDER — CLOTRIMAZOLE 1 %
CREAM (GRAM) TOPICAL 2 TIMES DAILY
Qty: 80 G | Refills: 2 | Status: SHIPPED | OUTPATIENT
Start: 2018-09-21 | End: 2019-09-03 | Stop reason: ALTCHOICE

## 2018-09-21 RX ORDER — TRIAMCINOLONE ACETONIDE 1 MG/G
CREAM TOPICAL 2 TIMES DAILY
Qty: 80 G | Refills: 2 | Status: SHIPPED | OUTPATIENT
Start: 2018-09-21 | End: 2020-02-11 | Stop reason: SDUPTHER

## 2018-09-21 NOTE — TELEPHONE ENCOUNTER
Pt left message on script line stating clotrimazole-betamethasone cream is not covered by his insurance  Wants to know if there is an alternative we could prescribe   Please advise     Send to jayden in [place]

## 2018-09-21 NOTE — TELEPHONE ENCOUNTER
I would have to separate the 2 medications so I can give him clotrimzaole and then aonther script for a steriod   Let me know if that's what he wants me to do  He would just mix equal parts of the meds together

## 2018-10-11 DIAGNOSIS — F32.A DEPRESSION, UNSPECIFIED DEPRESSION TYPE: Primary | ICD-10-CM

## 2018-10-12 RX ORDER — PAROXETINE 30 MG/1
30 TABLET, FILM COATED ORAL DAILY
Qty: 30 TABLET | Refills: 5 | Status: SHIPPED | OUTPATIENT
Start: 2018-10-12 | End: 2019-04-06 | Stop reason: SDUPTHER

## 2018-10-22 ENCOUNTER — OFFICE VISIT (OUTPATIENT)
Dept: CARDIOLOGY CLINIC | Facility: CLINIC | Age: 68
End: 2018-10-22
Payer: MEDICARE

## 2018-10-22 VITALS
SYSTOLIC BLOOD PRESSURE: 134 MMHG | HEIGHT: 70 IN | OXYGEN SATURATION: 98 % | HEART RATE: 57 BPM | WEIGHT: 197.5 LBS | BODY MASS INDEX: 28.27 KG/M2 | DIASTOLIC BLOOD PRESSURE: 78 MMHG

## 2018-10-22 DIAGNOSIS — Z98.61 CAD S/P PERCUTANEOUS CORONARY ANGIOPLASTY: Primary | ICD-10-CM

## 2018-10-22 DIAGNOSIS — Z95.1 HX OF CABG: ICD-10-CM

## 2018-10-22 DIAGNOSIS — I10 ESSENTIAL HYPERTENSION: ICD-10-CM

## 2018-10-22 DIAGNOSIS — I25.10 CAD S/P PERCUTANEOUS CORONARY ANGIOPLASTY: Primary | ICD-10-CM

## 2018-10-22 DIAGNOSIS — E78.2 MIXED HYPERLIPIDEMIA: ICD-10-CM

## 2018-10-22 PROCEDURE — 99214 OFFICE O/P EST MOD 30 MIN: CPT | Performed by: INTERNAL MEDICINE

## 2018-10-22 NOTE — PROGRESS NOTES
CARDIOLOGY OFFICE VISIT  [facility]  Holli Buchanan, [place], [address] [place] [address], [place], [facility] Yue Rubio  Tel: [phone]      NAME: Yolanda Common  AGE: 77 y o  SEX: male  : 1950   MRN: [id number]      Chief Complaint:  Chief Complaint   Patient presents with    Follow-up         History of Present Illness:   Patient comes for follow up  States he is doing well from cardiac stand point and denies chest pain / pressure, SOB, palpitations, lightheadedness, syncope, swelling feet, orthopnea, PND, claudication  CAD S/P PCI in , S/P CABG x 2 in  at [facility]  -  States is doing well cardiac wise with no cardiac symptoms  Taking all medications regularly  Has not used SL NTG since the last clinic visit  HTN -  Has been hypertensive for many years  Taking medications regularly  Denies lightheadedness, headache, medication side effects  HLP -  Has had hyperlipidemia for many years  Taking statin regularly along with diet control  Denies myalgia  PCP closely monitoring the blood work  Past Medical History:  Past Medical History:   Diagnosis Date    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    Allergic rhinitis     Anemia     Anxiety     Atherosclerosis     CAD (coronary artery disease)     Colitis     Depression     Diabetes mellitus (HCC)     Difficulty breathing     Gastritis     GERD (gastroesophageal reflux disease)     Hepatic cyst     Hyperlipidemia     Hypertension     IFG (impaired fasting glucose)     Myocardial infarction (HCC)     9 YEARS AGO    Psoriasis          Past Surgical History:  Past Surgical History:   Procedure Laterality Date    ANGIOPLASTY      COLON SURGERY      CORONARY ARTERY BYPASS GRAFT      MD COLONOSCOPY FLX DX W/COLLJ SPEC WHEN PFRMD N/A 2017    Procedure: COLONOSCOPY;  Surgeon: Yordan Mesa MD;  Location: [facility];   Service: Gastroenterology    MD RMVL HEATHER CTR VAD W/SUBQ PORT/ CTR/PRPH INSJ Left 5/13/2016    Procedure: REMOVAL VENOUS PORT (PORT-A-CATH);   Surgeon: Sotero Schultz MD;  [facility]: [facility];  Service: Colorectal         Family History:  Family History   Problem Relation Age of Onset    Colon cancer Family     Diverticulosis Family         colonic    Cancer Father          Social History:  Social History     Social History    Marital status: /Civil Union     Spouse name: N/A    Number of children: N/A    Years of education: N/A     Social History Main Topics    Smoking status: Never Smoker    Smokeless tobacco: Never Used    Alcohol use Yes      Comment: social    Drug use: No    Sexual activity: No     Other Topics Concern    None     Social History Narrative    No advance directives             Active Problems:  Patient Active Problem List   Diagnosis    Colon cancer ([facility] [address] )    CAD S/P percutaneous coronary angioplasty    Depression    Gastritis    Hypertension    Hyperlipidemia    Hx of CABG    Type 2 diabetes mellitus with complication, without long-term current use of insulin (HCC)         The following portions of the patient's history were reviewed and updated as appropriate: past medical history, past surgical history, past family history,  past social history, current medications, allergies and problem list       Review of Systems:  Constitutional: Denies fever, chills, fatigue  Eyes: Denies eye redness, eye discharge, double vision  ENT: Denies hearing loss, tinnitus, sneezing, nasal discharge, sore throat   Respiratory: Denies cough, expectoration, hemoptysis, shortness of breath  Cardiovascular: Denies chest pain, palpitations, orthopnea, PND, lower extremity swelling  Gastrointestinal: Denies abdominal pain, nausea, vomiting, hematemesis, diarrhea, bloody stools  Genito-Urinary: Denies dysuria, incontinence  Musculoskeletal: Denies back pain, joint pain, muscle pain  Neurologic: Denies confusion, lightheadedness, syncope, headache, focal weakness, sensory changes, seizures  Endocrine: Denies polyuria, polydipsia, temperature intolerance  Allergy and Immunology: Denies hives, insect bite sensitivity  Hematological and Lymphatic: Denies bleeding problems, swollen glands   Psychological: Denies depression, suicidal ideation, anxiety, panic  Dermatological: Denies pruritus, rash, skin lesion changes      Vitals:  Vitals:    10/22/18 0950   BP: 134/78   Pulse: 57   SpO2: 98%       Body mass index is 28 34 kg/m²  Weight (last 2 days)     Date/Time   Weight    10/22/18 0950  89 6 (197 5)                Physical Examination:  General: Patient is not in acute distress  Awake, alert, oriented in time, place and person  Responding to commands  Head: Normocephalic  Atraumatic  Eyes: Both pupils normal sized, round and reactive to light  Nonicteric  ENT: Normal external ear canals  Nares normal, no drainage  Lips and oral mucosa normal  Neck: Supple  JVP not raised  Trachea central  No thyromegaly  Lungs: Bilateral bronchovascular breath sounds with no crackles or rhonchi  Chest wall: Sternal scar+  Cardiovascular: RRR  S1 and S2 normal  No murmur, rub or gallop  Gastrointestinal: Abdomen soft, nontender  No guarding or rigidity  Liver and spleen not palpable  Bowel sounds present  Neurologic: Patient is awake, alert, oriented in time, place and person  Responding to command  Moving all extremities  Integumentary:  No skin rash  Lymphatic: No cervical lymphadenopathy  Back: Symmetric   No CVA tenderness  Extremities: No clubbing, cyanosis or edema      Laboratory Results:  CBC with diff:   Lab Results   Component Value Date    WBC 5 25 09/07/2018    WBC 6 1 03/16/2015    RBC 4 87 09/07/2018    RBC 4 91 03/16/2015    HGB 14 1 09/07/2018    HGB 14 7 03/16/2015    HCT 42 4 09/07/2018    HCT 42 0 03/16/2015    MCV 87 09/07/2018    MCV 85 03/16/2015    MCH 29 0 09/07/2018    MCH 29 9 03/16/2015    RDW 13 0 09/07/2018    RDW 12 0 03/16/2015    PLT 185 09/07/2018     03/16/2015       CMP:  Lab Results   Component Value Date    CREATININE 1 09 09/07/2018    CREATININE 1 1 03/16/2015    BUN 15 09/07/2018    BUN 12 03/16/2015     09/07/2018     (H) 03/16/2015    K 3 8 09/07/2018    K 4 0 03/16/2015     09/07/2018     (H) 03/16/2015    CO2 27 09/07/2018    CO2 29 4 03/16/2015    GLUCOSE 104 03/16/2015    PROT 6 8 03/16/2015    ALKPHOS 38 (L) 09/07/2018    ALKPHOS 89 03/16/2015    ALT 37 09/07/2018    ALT 43 03/16/2015    AST 27 09/07/2018    AST 23 03/16/2015    BILIDIR 0 19 09/07/2018       Lab Results   Component Value Date    HGBA1C 5 6 09/07/2018    HGBA1C 5 1 03/16/2015    MG 2 0 04/01/2014    PHOS 2 4 (L) 04/01/2014       Lipid Profile:   Lab Results   Component Value Date    CHOL 129 03/16/2015    CHOL 148 02/12/2014     Lab Results   Component Value Date    HDL 35 (L) 09/07/2018    HDL 39 (L) 03/06/2018    HDL 44 09/20/2017     Lab Results   Component Value Date    LDLCALC 74 09/07/2018    LDLCALC 72 03/06/2018    LDLCALC 69 09/20/2017     Lab Results   Component Value Date    TRIG 162 (H) 09/07/2018    TRIG 102 03/06/2018    TRIG 120 09/20/2017       Medications:    Current Outpatient Prescriptions:     aspirin 81 MG tablet, Take 81 mg by mouth daily  , Disp: , Rfl:     clotrimazole (LOTRIMIN) 1 % cream, Apply topically 2 (two) times a day, Disp: 80 g, Rfl: 2    clotrimazole-betamethasone (LOTRISONE) 1-0 05 % cream, Apply topically 2 (two) times a day, Disp: 30 g, Rfl: 2    enalapril (VASOTEC) 5 mg tablet, Take 1 tablet (5 mg total) by mouth daily, Disp: 90 tablet, Rfl: 3    fenofibrate (TRICOR) 145 mg tablet, Take 1 tablet (145 mg total) by mouth daily, Disp: 90 tablet, Rfl: 3    metoprolol succinate (TOPROL-XL) 25 mg 24 hr tablet, Take 0 5 tablets (12 5 mg total) by mouth daily, Disp: 45 tablet, Rfl: 3    nitroglycerin (NITROSTAT) 0 4 mg SL tablet, Place 0 4 mg under the tongue every 5 (five) minutes as needed for chest pain, Disp: , Rfl:     omeprazole (PriLOSEC) 20 mg delayed release capsule, Take 1 capsule (20 mg total) by mouth daily, Disp: 30 capsule, Rfl: 2    PARoxetine (PAXIL) 30 mg tablet, Take 1 tablet (30 mg total) by mouth daily, Disp: 30 tablet, Rfl: 5    simvastatin (ZOCOR) 40 mg tablet, Take 1 tablet (40 mg total) by mouth daily at bedtime for 30 days, Disp: 30 tablet, Rfl: 5    triamcinolone (KENALOG) 0 1 % cream, Apply topically 2 (two) times a day (Patient not taking: Reported on 10/22/2018 ), Disp: 80 g, Rfl: 2      Allergies: Allergies   Allergen Reactions    Penicillins Hives         Assessment and Plan:  1  CAD S/P percutaneous coronary angioplasty S/P CABG  Stable  Doing well  Continue aspirin, ACE-inhibitor, beta-blocker, statin, fenofibrate, SL NTG as needed    2  Essential hypertension   BP stable  Continue current medications    3  Mixed hyperlipidemia   continue statin and fenofibrate  Last lipid panel from September 2018 reviewed with the patient  Almost at goal    Recommend aggressive risk factor modification and therapeutic lifestyle changes  Low-salt, low-calorie, low-fat, low-cholesterol diet with regular exercise and to optimize weight  I will defer the ordering and monitoring of necessity lab studies to you, but I am available and happy to review and manage any of the data at your request in the future  Discussed concepts of atherosclerosis, including signs and symptoms of cardiac disease  Previous studies were reviewed  Safety measures were reviewed  Questions were entertained and answered  Patient was advised to report any problems requiring medical attention  Follow-up with PCP and appropriate specialist and lab work as discussed  Return for follow up visit as scheduled or earlier, if needed  Thank you for allowing me to participate in the care and evaluation of your patient  Should you have any questions, please feel free to contact me        Barby Zelaya Harleen Scott MD  10/22/2018,10:22 AM

## 2018-10-30 ENCOUNTER — TELEPHONE (OUTPATIENT)
Dept: GASTROENTEROLOGY | Facility: CLINIC | Age: 68
End: 2018-10-30

## 2019-01-28 DIAGNOSIS — E78.49 OTHER HYPERLIPIDEMIA: ICD-10-CM

## 2019-01-28 DIAGNOSIS — E78.5 HYPERLIPIDEMIA, UNSPECIFIED HYPERLIPIDEMIA TYPE: ICD-10-CM

## 2019-01-28 DIAGNOSIS — K21.9 GASTROESOPHAGEAL REFLUX DISEASE WITHOUT ESOPHAGITIS: ICD-10-CM

## 2019-01-28 DIAGNOSIS — I10 HYPERTENSION, UNSPECIFIED TYPE: ICD-10-CM

## 2019-01-28 RX ORDER — FENOFIBRATE 145 MG/1
TABLET, COATED ORAL
Qty: 90 TABLET | Refills: 3 | Status: SHIPPED | OUTPATIENT
Start: 2019-01-28 | End: 2019-02-25 | Stop reason: SDUPTHER

## 2019-01-28 RX ORDER — OMEPRAZOLE 20 MG/1
CAPSULE, DELAYED RELEASE ORAL
Qty: 30 CAPSULE | Refills: 2 | Status: SHIPPED | OUTPATIENT
Start: 2019-01-28 | End: 2019-05-06 | Stop reason: SDUPTHER

## 2019-01-28 RX ORDER — ENALAPRIL MALEATE 5 MG/1
TABLET ORAL
Qty: 90 TABLET | Refills: 3 | Status: SHIPPED | OUTPATIENT
Start: 2019-01-28 | End: 2020-01-24

## 2019-01-28 RX ORDER — SIMVASTATIN 40 MG
TABLET ORAL
Qty: 30 TABLET | Refills: 5 | Status: SHIPPED | OUTPATIENT
Start: 2019-01-28 | End: 2019-05-02 | Stop reason: SDUPTHER

## 2019-02-23 ENCOUNTER — APPOINTMENT (OUTPATIENT)
Dept: LAB | Facility: CLINIC | Age: 69
End: 2019-02-23
Payer: MEDICARE

## 2019-02-23 DIAGNOSIS — I10 ESSENTIAL HYPERTENSION: ICD-10-CM

## 2019-02-23 DIAGNOSIS — E11.8 TYPE 2 DIABETES MELLITUS WITH COMPLICATION, WITHOUT LONG-TERM CURRENT USE OF INSULIN (HCC): ICD-10-CM

## 2019-02-23 DIAGNOSIS — Z11.59 NEED FOR HEPATITIS C SCREENING TEST: ICD-10-CM

## 2019-02-23 DIAGNOSIS — E78.2 MIXED HYPERLIPIDEMIA: ICD-10-CM

## 2019-02-23 DIAGNOSIS — C18.9 MALIGNANT NEOPLASM OF COLON, UNSPECIFIED PART OF COLON (HCC): ICD-10-CM

## 2019-02-23 LAB
ALBUMIN SERPL BCP-MCNC: 3.9 G/DL (ref 3.5–5)
ALP SERPL-CCNC: 43 U/L (ref 46–116)
ALT SERPL W P-5'-P-CCNC: 45 U/L (ref 12–78)
ANION GAP SERPL CALCULATED.3IONS-SCNC: 3 MMOL/L (ref 4–13)
AST SERPL W P-5'-P-CCNC: 32 U/L (ref 5–45)
BASOPHILS # BLD AUTO: 0.04 THOUSANDS/ΜL (ref 0–0.1)
BASOPHILS NFR BLD AUTO: 1 % (ref 0–1)
BILIRUB SERPL-MCNC: 0.59 MG/DL (ref 0.2–1)
BUN SERPL-MCNC: 16 MG/DL (ref 5–25)
CALCIUM SERPL-MCNC: 8.9 MG/DL (ref 8.3–10.1)
CHLORIDE SERPL-SCNC: 112 MMOL/L (ref 100–108)
CHOLEST SERPL-MCNC: 130 MG/DL (ref 50–200)
CO2 SERPL-SCNC: 27 MMOL/L (ref 21–32)
CREAT SERPL-MCNC: 1.13 MG/DL (ref 0.6–1.3)
EOSINOPHIL # BLD AUTO: 0.3 THOUSAND/ΜL (ref 0–0.61)
EOSINOPHIL NFR BLD AUTO: 6 % (ref 0–6)
ERYTHROCYTE [DISTWIDTH] IN BLOOD BY AUTOMATED COUNT: 13.2 % (ref 11.6–15.1)
EST. AVERAGE GLUCOSE BLD GHB EST-MCNC: 126 MG/DL
GFR SERPL CREATININE-BSD FRML MDRD: 65 ML/MIN/1.73SQ M
GLUCOSE P FAST SERPL-MCNC: 94 MG/DL (ref 65–99)
HBA1C MFR BLD: 6 % (ref 4.2–6.3)
HCT VFR BLD AUTO: 43.2 % (ref 36.5–49.3)
HDLC SERPL-MCNC: 36 MG/DL (ref 40–60)
HGB BLD-MCNC: 14.2 G/DL (ref 12–17)
IMM GRANULOCYTES # BLD AUTO: 0.01 THOUSAND/UL (ref 0–0.2)
IMM GRANULOCYTES NFR BLD AUTO: 0 % (ref 0–2)
LDLC SERPL CALC-MCNC: 72 MG/DL (ref 0–100)
LYMPHOCYTES # BLD AUTO: 1.14 THOUSANDS/ΜL (ref 0.6–4.47)
LYMPHOCYTES NFR BLD AUTO: 24 % (ref 14–44)
MCH RBC QN AUTO: 28.9 PG (ref 26.8–34.3)
MCHC RBC AUTO-ENTMCNC: 32.9 G/DL (ref 31.4–37.4)
MCV RBC AUTO: 88 FL (ref 82–98)
MONOCYTES # BLD AUTO: 0.63 THOUSAND/ΜL (ref 0.17–1.22)
MONOCYTES NFR BLD AUTO: 13 % (ref 4–12)
NEUTROPHILS # BLD AUTO: 2.71 THOUSANDS/ΜL (ref 1.85–7.62)
NEUTS SEG NFR BLD AUTO: 56 % (ref 43–75)
NONHDLC SERPL-MCNC: 94 MG/DL
NRBC BLD AUTO-RTO: 0 /100 WBCS
PLATELET # BLD AUTO: 214 THOUSANDS/UL (ref 149–390)
PMV BLD AUTO: 10.5 FL (ref 8.9–12.7)
POTASSIUM SERPL-SCNC: 4.6 MMOL/L (ref 3.5–5.3)
PROT SERPL-MCNC: 6.9 G/DL (ref 6.4–8.2)
RBC # BLD AUTO: 4.91 MILLION/UL (ref 3.88–5.62)
SODIUM SERPL-SCNC: 142 MMOL/L (ref 136–145)
TRIGL SERPL-MCNC: 111 MG/DL
TSH SERPL DL<=0.05 MIU/L-ACNC: 1.81 UIU/ML (ref 0.36–3.74)
WBC # BLD AUTO: 4.83 THOUSAND/UL (ref 4.31–10.16)

## 2019-02-23 PROCEDURE — 83036 HEMOGLOBIN GLYCOSYLATED A1C: CPT

## 2019-02-23 PROCEDURE — 85025 COMPLETE CBC W/AUTO DIFF WBC: CPT

## 2019-02-23 PROCEDURE — 80061 LIPID PANEL: CPT

## 2019-02-23 PROCEDURE — 86803 HEPATITIS C AB TEST: CPT

## 2019-02-23 PROCEDURE — 84443 ASSAY THYROID STIM HORMONE: CPT

## 2019-02-23 PROCEDURE — 36415 COLL VENOUS BLD VENIPUNCTURE: CPT

## 2019-02-23 PROCEDURE — 80053 COMPREHEN METABOLIC PANEL: CPT

## 2019-02-24 LAB — HCV AB SER QL: NORMAL

## 2019-02-25 ENCOUNTER — OFFICE VISIT (OUTPATIENT)
Dept: INTERNAL MEDICINE CLINIC | Facility: CLINIC | Age: 69
End: 2019-02-25
Payer: MEDICARE

## 2019-02-25 VITALS
SYSTOLIC BLOOD PRESSURE: 138 MMHG | RESPIRATION RATE: 12 BRPM | HEART RATE: 62 BPM | BODY MASS INDEX: 28.18 KG/M2 | HEIGHT: 70 IN | DIASTOLIC BLOOD PRESSURE: 82 MMHG | WEIGHT: 196.8 LBS

## 2019-02-25 DIAGNOSIS — E78.2 MIXED HYPERLIPIDEMIA: ICD-10-CM

## 2019-02-25 DIAGNOSIS — E78.49 OTHER HYPERLIPIDEMIA: ICD-10-CM

## 2019-02-25 DIAGNOSIS — M35.9: ICD-10-CM

## 2019-02-25 DIAGNOSIS — I10 ESSENTIAL HYPERTENSION: ICD-10-CM

## 2019-02-25 DIAGNOSIS — C18.9 MALIGNANT NEOPLASM OF COLON, UNSPECIFIED PART OF COLON (HCC): Primary | ICD-10-CM

## 2019-02-25 DIAGNOSIS — Z98.61 CAD S/P PERCUTANEOUS CORONARY ANGIOPLASTY: ICD-10-CM

## 2019-02-25 DIAGNOSIS — I25.10 CAD S/P PERCUTANEOUS CORONARY ANGIOPLASTY: ICD-10-CM

## 2019-02-25 DIAGNOSIS — E11.8 TYPE 2 DIABETES MELLITUS WITH COMPLICATION, WITHOUT LONG-TERM CURRENT USE OF INSULIN (HCC): ICD-10-CM

## 2019-02-25 PROBLEM — E11.9 TYPE 2 DIABETES MELLITUS WITHOUT COMPLICATION, WITHOUT LONG-TERM CURRENT USE OF INSULIN (HCC): Status: ACTIVE | Noted: 2019-02-25

## 2019-02-25 PROBLEM — F32.5 MAJOR DEPRESSIVE DISORDER WITH SINGLE EPISODE, IN FULL REMISSION (HCC): Status: ACTIVE | Noted: 2019-02-25

## 2019-02-25 PROCEDURE — 99214 OFFICE O/P EST MOD 30 MIN: CPT | Performed by: NURSE PRACTITIONER

## 2019-02-25 RX ORDER — FENOFIBRATE 145 MG/1
145 TABLET, COATED ORAL DAILY
Qty: 90 TABLET | Refills: 3 | Status: SHIPPED | OUTPATIENT
Start: 2019-02-25 | End: 2020-01-24

## 2019-02-25 RX ORDER — ALPRAZOLAM 0.25 MG/1
0.25 TABLET ORAL
COMMUNITY
End: 2021-03-02 | Stop reason: CLARIF

## 2019-02-25 NOTE — ASSESSMENT & PLAN NOTE
Lab Results   Component Value Date    HGBA1C 6 0 02/23/2019       No results for input(s): POCGLU in the last 72 hours      Blood Sugar Average: Last 72 hrs:

## 2019-02-25 NOTE — PROGRESS NOTES
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 recommend triamcinolone cream mixed with moisturizer to the feet once to twice a day as needed small amount of triamcinolone to external auditory canal         Diagnoses and all orders for this visit:    Malignant neoplasm of colon, unspecified part of colon ([facility] [address] )    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 ([facility] )    Type 2 diabetes mellitus with complication, without long-term current use of insulin ([facility] )  -     Hemoglobin A1C; Future  -     Microalbumin / creatinine urine ratio; Future    CAD S/P percutaneous coronary angioplasty    Essential hypertension  -     CBC and differential; Future    Mixed hyperlipidemia  -     Comprehensive metabolic panel; Future  -     Lipid panel; Future  -     TSH, 3rd generation; Future    Other hyperlipidemia  -     fenofibrate (TRICOR) 145 mg tablet; Take 1 tablet (145 mg total) by mouth daily    Other orders  -     ALPRAZolam (XANAX) 0 25 mg tablet; alprazolam 0 25 mg tablet         Subjective:      Patient ID: Michael Mcguire is a 68 y o  male    Active but no formal exercise  Psoriasis to ears and foot not really bothersome  No home sugars admits to drinking more so than usual  No home blood pressures  Taking Paxil and alprazolam doing well  Colonoscopy due next year  No change in urination        Current Outpatient Medications:     ALPRAZolam (XANAX) 0 25 mg tablet, alprazolam 0 25 mg tablet, Disp: , Rfl:     aspirin 81 MG tablet, Take 81 mg by mouth daily  , Disp: , Rfl:     clotrimazole (LOTRIMIN) 1 % cream, Apply topically 2 (two) times a day, Disp: 80 g, Rfl: 2    enalapril (VASOTEC) 5 mg tablet, TAKE ONE TABLET BY MOUTH EVERY DAY (Patient taking differently: TAKE 1/2 TABLET BY MOUTH EVERY DAY), Disp: 90 tablet, Rfl: 3    fenofibrate (TRICOR) 145 mg tablet, Take 1 tablet (145 mg total) by mouth daily, Disp: 90 tablet, Rfl: 3    metoprolol succinate (TOPROL-XL) 25 mg 24 hr tablet, Take 0 5 tablets (12 5 mg total) by mouth daily, Disp: 45 tablet, Rfl: 3    nitroglycerin (NITROSTAT) 0 4 mg SL tablet, Place 0 4 mg under the tongue every 5 (five) minutes as needed for chest pain, Disp: , Rfl:     omeprazole (PriLOSEC) 20 mg delayed release capsule, TAKE 1 CAPSULE (20MG) BY MOUTH DAILY, Disp: 30 capsule, Rfl: 2    PARoxetine (PAXIL) 30 mg tablet, Take 1 tablet (30 mg total) by mouth daily, Disp: 30 tablet, Rfl: 5    simvastatin (ZOCOR) 40 mg tablet, TAKE 1 TABLET BY MOUTH DAILY AT BEDTIME FOR 30 DAYS , Disp: 30 tablet, Rfl: 5    clotrimazole-betamethasone (LOTRISONE) 1-0 05 % cream, Apply topically 2 (two) times a day (Patient not taking: Reported on 2/25/2019), Disp: 30 g, Rfl: 2    triamcinolone (KENALOG) 0 1 % cream, Apply topically 2 (two) times a day (Patient not taking: Reported on 10/22/2018 ), Disp: 80 g, Rfl: 2    Recent Results (from the past 1008 hour(s))   CBC and differential    Collection Time: 02/23/19  9:51 AM   Result Value Ref Range    WBC 4 83 4 31 - 10 16 Thousand/uL    RBC 4 91 3 88 - 5 62 Million/uL    Hemoglobin 14 2 12 0 - 17 0 g/dL    Hematocrit 43 2 36 5 - 49 3 %    MCV 88 82 - 98 fL    MCH 28 9 26 8 - 34 3 pg    MCHC 32 9 31 4 - 37 4 g/dL    RDW 13 2 11 6 - 15 1 %    MPV 10 5 8 9 - 12 7 fL    Platelets 039 704 - 470 Thousands/uL    nRBC 0 /100 WBCs    Neutrophils Relative 56 43 - 75 %    Immat GRANS % 0 0 - 2 %    Lymphocytes Relative 24 14 - 44 %    Monocytes Relative 13 (H) 4 - 12 %    Eosinophils Relative 6 0 - 6 %    Basophils Relative 1 0 - 1 %    Neutrophils Absolute 2 71 1 85 - 7 62 Thousands/µL    Immature Grans Absolute 0 01 0 00 - 0 20 Thousand/uL    Lymphocytes Absolute 1 14 0 60 - 4 47 Thousands/µL    Monocytes Absolute 0 63 0 17 - 1 22 Thousand/µL    Eosinophils Absolute 0 30 0 00 - 0 61 Thousand/µL    Basophils Absolute 0 04 0 00 - 0 10 Thousands/µL   Comprehensive metabolic panel    Collection Time: 02/23/19  9:51 AM   Result Value Ref Range    Sodium 142 136 - 145 mmol/L    Potassium 4 6 3 5 - 5 3 mmol/L    Chloride 112 (H) 100 - 108 mmol/L    CO2 27 21 - 32 mmol/L    ANION GAP 3 (L) 4 - 13 mmol/L    BUN 16 5 - 25 mg/dL    Creatinine 1 13 0 60 - 1 30 mg/dL    Glucose, Fasting 94 65 - 99 mg/dL    Calcium 8 9 8 3 - 10 1 mg/dL    AST 32 5 - 45 U/L    ALT 45 12 - 78 U/L    Alkaline Phosphatase 43 (L) 46 - 116 U/L    Total Protein 6 9 6 4 - 8 2 g/dL    Albumin 3 9 3 5 - 5 0 g/dL    Total Bilirubin 0 59 0 20 - 1 00 mg/dL    eGFR 65 ml/min/1 73sq m   Lipid panel    Collection Time: 02/23/19  9:51 AM   Result Value Ref Range    Cholesterol 130 50 - 200 mg/dL    Triglycerides 111 <=150 mg/dL    HDL, Direct 36 (L) 40 - 60 mg/dL    LDL Calculated 72 0 - 100 mg/dL    Non-HDL-Chol (CHOL-HDL) 94 mg/dl   Hemoglobin A1C    Collection Time: 02/23/19  9:51 AM   Result Value Ref Range    Hemoglobin A1C 6 0 4 2 - 6 3 %     mg/dl   TSH, 3rd generation    Collection Time: 02/23/19  9:51 AM   Result Value Ref Range    TSH 3RD GENERATON 1 810 0 358 - 3 740 uIU/mL   Hepatitis C antibody    Collection Time: 02/23/19  9:51 AM   Result Value Ref Range    Hepatitis C Ab Non-reactive Non-reactive       The following portions of the patient's history were reviewed and updated as appropriate: allergies, current medications, past family history, past medical history, past social history, past surgical history and problem list      Review of Systems   Constitutional: Negative for appetite change, chills, diaphoresis, fatigue, fever and unexpected weight change  HENT: Negative for postnasal drip and sneezing  Eyes: Negative for visual disturbance  Respiratory: Negative for chest tightness and shortness of breath  Cardiovascular: Negative for chest pain, palpitations and leg swelling  Gastrointestinal: Negative for abdominal pain and blood in stool  Endocrine: Negative for cold intolerance, heat intolerance, polydipsia, polyphagia and polyuria  Genitourinary: Negative for difficulty urinating, dysuria, frequency and urgency  Musculoskeletal: Negative for arthralgias and myalgias  Skin: Negative for rash and wound  Neurological: Negative for dizziness, weakness, light-headedness and headaches  Hematological: Negative for adenopathy  Psychiatric/Behavioral: Negative for confusion, dysphoric mood and sleep disturbance  The patient is not nervous/anxious  Objective:      /82 (BP Location: Left arm, Patient Position: Sitting)   Pulse 62   Resp 12   Ht 5' 10" (1 778 m)   Wt 89 3 kg (196 lb 12 8 oz)   BMI 28 24 kg/m²        Physical Exam   Constitutional: He is oriented to person, place, and time  He appears well-developed  No distress  HENT:   Head: Normocephalic and atraumatic  Nose: Nose normal    Mouth/Throat: Oropharynx is clear and moist    Eyes: Pupils are equal, round, and reactive to light  Conjunctivae and EOM are normal    Neck: Normal range of motion  Neck supple  No JVD present  No tracheal deviation present  No thyromegaly present  Cardiovascular: Normal rate, regular rhythm and intact distal pulses  Exam reveals no gallop and no friction rub  Murmur heard  Pulses:       Dorsalis pedis pulses are 2+ on the right side, and 2+ on the left side  Posterior tibial pulses are 2+ on the right side, and 2+ on the left side  Pulmonary/Chest: Effort normal and breath sounds normal  No respiratory distress  He has no wheezes  He has no rales  Abdominal: Soft  Bowel sounds are normal  He exhibits no distension  There is no tenderness  Musculoskeletal: Normal range of motion  He exhibits no edema  Feet:   Right Foot:   Skin Integrity: Positive for dry skin  Negative for ulcer, skin breakdown, erythema, warmth or callus  Left Foot:   Skin Integrity: Positive for dry skin  Negative for ulcer, skin breakdown, erythema, warmth or callus  Lymphadenopathy:     He has no cervical adenopathy  Neurological: He is alert and oriented to person, place, and time  No cranial nerve deficit  Skin: Skin is warm and dry  No rash noted  He is not diaphoretic  Psychiatric: He has a normal mood and affect  His behavior is normal  Judgment and thought content normal    Patient's shoes and socks removed  Right Foot/Ankle   Right Foot Inspection  Skin Exam: skin normal, skin intact and dry skin no warmth, no callus, no erythema, no maceration, no abnormal color, no pre-ulcer, no ulcer and no callus                          Toe Exam: ROM and strength within normal limits  Sensory   Vibration: intact  Proprioception: intact   Monofilament testing: intact  Vascular  Capillary refills: < 3 seconds  The right DP pulse is 2+  The right PT pulse is 2+  Left Foot/Ankle  Left Foot Inspection  Skin Exam: skin normal, skin intact and dry skinno warmth, no erythema, no maceration, normal color, no pre-ulcer, no ulcer and no callus                         Toe Exam: ROM and strength within normal limits                   Sensory   Vibration: intact  Proprioception: intact  Monofilament: intact  Vascular  Capillary refills: < 3 seconds  The left DP pulse is 2+  The left PT pulse is 2+     Assign Risk Category:  No deformity present; ;        Risk: 0

## 2019-02-25 NOTE — PATIENT INSTRUCTIONS
History of colon cancer having colonoscopy every 3 years due next year    Depression anxiety stable on Paxil and alprazolam as needed    Hypertension stable on current medication    Dyslipidemia stable on statin and fenofibrate    Coronary artery disease stable without angina continue to modify risk factors    Health maintenance colonoscopy as above recommend pneumonia shot which he will consider for next visit declined flu shot    Psoriasis to feet and ears recommend triamcinolone cream mixed with moisturizer to the feet once to twice a day as needed small amount of triamcinolone to external auditory canal

## 2019-04-06 DIAGNOSIS — F32.A DEPRESSION, UNSPECIFIED DEPRESSION TYPE: ICD-10-CM

## 2019-04-08 RX ORDER — PAROXETINE 30 MG/1
TABLET, FILM COATED ORAL
Qty: 30 TABLET | Refills: 5 | Status: SHIPPED | OUTPATIENT
Start: 2019-04-08 | End: 2019-10-05 | Stop reason: SDUPTHER

## 2019-04-23 ENCOUNTER — OFFICE VISIT (OUTPATIENT)
Dept: CARDIOLOGY CLINIC | Facility: CLINIC | Age: 69
End: 2019-04-23
Payer: MEDICARE

## 2019-04-23 VITALS
DIASTOLIC BLOOD PRESSURE: 64 MMHG | OXYGEN SATURATION: 95 % | SYSTOLIC BLOOD PRESSURE: 128 MMHG | BODY MASS INDEX: 28.35 KG/M2 | HEIGHT: 70 IN | WEIGHT: 198 LBS | HEART RATE: 69 BPM

## 2019-04-23 DIAGNOSIS — Z98.61 CAD S/P PERCUTANEOUS CORONARY ANGIOPLASTY: Primary | ICD-10-CM

## 2019-04-23 DIAGNOSIS — Z95.1 HX OF CABG: ICD-10-CM

## 2019-04-23 DIAGNOSIS — I25.10 CAD S/P PERCUTANEOUS CORONARY ANGIOPLASTY: Primary | ICD-10-CM

## 2019-04-23 DIAGNOSIS — I10 ESSENTIAL HYPERTENSION: ICD-10-CM

## 2019-04-23 DIAGNOSIS — E78.2 MIXED HYPERLIPIDEMIA: ICD-10-CM

## 2019-04-23 PROCEDURE — 99214 OFFICE O/P EST MOD 30 MIN: CPT | Performed by: INTERNAL MEDICINE

## 2019-05-02 DIAGNOSIS — E78.5 HYPERLIPIDEMIA, UNSPECIFIED HYPERLIPIDEMIA TYPE: ICD-10-CM

## 2019-05-02 RX ORDER — SIMVASTATIN 40 MG
40 TABLET ORAL
Qty: 90 TABLET | Refills: 2 | Status: SHIPPED | OUTPATIENT
Start: 2019-05-02 | End: 2020-01-24

## 2019-05-06 DIAGNOSIS — K21.9 GASTROESOPHAGEAL REFLUX DISEASE WITHOUT ESOPHAGITIS: ICD-10-CM

## 2019-05-06 RX ORDER — OMEPRAZOLE 20 MG/1
CAPSULE, DELAYED RELEASE ORAL
Qty: 30 CAPSULE | Refills: 2 | Status: SHIPPED | OUTPATIENT
Start: 2019-05-06 | End: 2019-08-04 | Stop reason: SDUPTHER

## 2019-07-01 DIAGNOSIS — I10 ESSENTIAL HYPERTENSION: ICD-10-CM

## 2019-07-01 RX ORDER — METOPROLOL SUCCINATE 25 MG/1
12.5 TABLET, EXTENDED RELEASE ORAL DAILY
Qty: 45 TABLET | Refills: 3 | Status: SHIPPED | OUTPATIENT
Start: 2019-07-01 | End: 2020-02-11 | Stop reason: SDUPTHER

## 2019-08-04 DIAGNOSIS — K21.9 GASTROESOPHAGEAL REFLUX DISEASE WITHOUT ESOPHAGITIS: ICD-10-CM

## 2019-08-04 RX ORDER — OMEPRAZOLE 20 MG/1
CAPSULE, DELAYED RELEASE ORAL
Qty: 30 CAPSULE | Refills: 2 | Status: SHIPPED | OUTPATIENT
Start: 2019-08-04 | End: 2020-03-18 | Stop reason: SDUPTHER

## 2019-09-03 ENCOUNTER — OFFICE VISIT (OUTPATIENT)
Dept: INTERNAL MEDICINE CLINIC | Facility: CLINIC | Age: 69
End: 2019-09-03
Payer: MEDICARE

## 2019-09-03 ENCOUNTER — APPOINTMENT (OUTPATIENT)
Dept: LAB | Facility: CLINIC | Age: 69
End: 2019-09-03
Payer: MEDICARE

## 2019-09-03 ENCOUNTER — TELEPHONE (OUTPATIENT)
Dept: INTERNAL MEDICINE CLINIC | Facility: CLINIC | Age: 69
End: 2019-09-03

## 2019-09-03 VITALS
WEIGHT: 197 LBS | SYSTOLIC BLOOD PRESSURE: 114 MMHG | HEIGHT: 70 IN | DIASTOLIC BLOOD PRESSURE: 70 MMHG | BODY MASS INDEX: 28.2 KG/M2 | HEART RATE: 54 BPM | RESPIRATION RATE: 12 BRPM

## 2019-09-03 DIAGNOSIS — E11.9 TYPE 2 DIABETES MELLITUS WITHOUT COMPLICATION, WITHOUT LONG-TERM CURRENT USE OF INSULIN (HCC): Primary | ICD-10-CM

## 2019-09-03 DIAGNOSIS — E78.2 MIXED HYPERLIPIDEMIA: ICD-10-CM

## 2019-09-03 DIAGNOSIS — I10 ESSENTIAL HYPERTENSION: ICD-10-CM

## 2019-09-03 DIAGNOSIS — E11.8 TYPE 2 DIABETES MELLITUS WITH COMPLICATION, WITHOUT LONG-TERM CURRENT USE OF INSULIN (HCC): ICD-10-CM

## 2019-09-03 DIAGNOSIS — Z98.61 CAD S/P PERCUTANEOUS CORONARY ANGIOPLASTY: ICD-10-CM

## 2019-09-03 DIAGNOSIS — Z12.5 SCREENING FOR PROSTATE CANCER: ICD-10-CM

## 2019-09-03 DIAGNOSIS — C18.9 MALIGNANT NEOPLASM OF COLON, UNSPECIFIED PART OF COLON (HCC): ICD-10-CM

## 2019-09-03 DIAGNOSIS — I25.10 CAD S/P PERCUTANEOUS CORONARY ANGIOPLASTY: ICD-10-CM

## 2019-09-03 DIAGNOSIS — F32.5 MAJOR DEPRESSIVE DISORDER WITH SINGLE EPISODE, IN FULL REMISSION (HCC): ICD-10-CM

## 2019-09-03 DIAGNOSIS — R09.89 BRUIT: Primary | ICD-10-CM

## 2019-09-03 LAB
ALBUMIN SERPL BCP-MCNC: 3.9 G/DL (ref 3.5–5)
ALP SERPL-CCNC: 44 U/L (ref 46–116)
ALT SERPL W P-5'-P-CCNC: 37 U/L (ref 12–78)
ANION GAP SERPL CALCULATED.3IONS-SCNC: 5 MMOL/L (ref 4–13)
AST SERPL W P-5'-P-CCNC: 29 U/L (ref 5–45)
BASOPHILS # BLD AUTO: 0.05 THOUSANDS/ΜL (ref 0–0.1)
BASOPHILS NFR BLD AUTO: 1 % (ref 0–1)
BILIRUB SERPL-MCNC: 0.62 MG/DL (ref 0.2–1)
BUN SERPL-MCNC: 13 MG/DL (ref 5–25)
CALCIUM SERPL-MCNC: 9.1 MG/DL (ref 8.3–10.1)
CHLORIDE SERPL-SCNC: 108 MMOL/L (ref 100–108)
CHOLEST SERPL-MCNC: 140 MG/DL (ref 50–200)
CO2 SERPL-SCNC: 28 MMOL/L (ref 21–32)
CREAT SERPL-MCNC: 1.3 MG/DL (ref 0.6–1.3)
CREAT UR-MCNC: 193 MG/DL
EOSINOPHIL # BLD AUTO: 0.23 THOUSAND/ΜL (ref 0–0.61)
EOSINOPHIL NFR BLD AUTO: 4 % (ref 0–6)
ERYTHROCYTE [DISTWIDTH] IN BLOOD BY AUTOMATED COUNT: 13.1 % (ref 11.6–15.1)
EST. AVERAGE GLUCOSE BLD GHB EST-MCNC: 111 MG/DL
GFR SERPL CREATININE-BSD FRML MDRD: 55 ML/MIN/1.73SQ M
GLUCOSE P FAST SERPL-MCNC: 102 MG/DL (ref 65–99)
HBA1C MFR BLD: 5.5 % (ref 4.2–6.3)
HCT VFR BLD AUTO: 44.3 % (ref 36.5–49.3)
HDLC SERPL-MCNC: 36 MG/DL (ref 40–60)
HGB BLD-MCNC: 14.4 G/DL (ref 12–17)
IMM GRANULOCYTES # BLD AUTO: 0.02 THOUSAND/UL (ref 0–0.2)
IMM GRANULOCYTES NFR BLD AUTO: 0 % (ref 0–2)
LDLC SERPL CALC-MCNC: 81 MG/DL (ref 0–100)
LYMPHOCYTES # BLD AUTO: 1.54 THOUSANDS/ΜL (ref 0.6–4.47)
LYMPHOCYTES NFR BLD AUTO: 26 % (ref 14–44)
MCH RBC QN AUTO: 28.2 PG (ref 26.8–34.3)
MCHC RBC AUTO-ENTMCNC: 32.5 G/DL (ref 31.4–37.4)
MCV RBC AUTO: 87 FL (ref 82–98)
MICROALBUMIN UR-MCNC: 8.5 MG/L (ref 0–20)
MICROALBUMIN/CREAT 24H UR: 4 MG/G CREATININE (ref 0–30)
MONOCYTES # BLD AUTO: 0.64 THOUSAND/ΜL (ref 0.17–1.22)
MONOCYTES NFR BLD AUTO: 11 % (ref 4–12)
NEUTROPHILS # BLD AUTO: 3.4 THOUSANDS/ΜL (ref 1.85–7.62)
NEUTS SEG NFR BLD AUTO: 58 % (ref 43–75)
NONHDLC SERPL-MCNC: 104 MG/DL
NRBC BLD AUTO-RTO: 0 /100 WBCS
PLATELET # BLD AUTO: 223 THOUSANDS/UL (ref 149–390)
PMV BLD AUTO: 10.2 FL (ref 8.9–12.7)
POTASSIUM SERPL-SCNC: 4.1 MMOL/L (ref 3.5–5.3)
PROT SERPL-MCNC: 7.1 G/DL (ref 6.4–8.2)
RBC # BLD AUTO: 5.11 MILLION/UL (ref 3.88–5.62)
SODIUM SERPL-SCNC: 141 MMOL/L (ref 136–145)
TRIGL SERPL-MCNC: 117 MG/DL
TSH SERPL DL<=0.05 MIU/L-ACNC: 3.34 UIU/ML (ref 0.36–3.74)
WBC # BLD AUTO: 5.88 THOUSAND/UL (ref 4.31–10.16)

## 2019-09-03 PROCEDURE — 85025 COMPLETE CBC W/AUTO DIFF WBC: CPT

## 2019-09-03 PROCEDURE — 80061 LIPID PANEL: CPT

## 2019-09-03 PROCEDURE — 83036 HEMOGLOBIN GLYCOSYLATED A1C: CPT

## 2019-09-03 PROCEDURE — 82570 ASSAY OF URINE CREATININE: CPT

## 2019-09-03 PROCEDURE — 84443 ASSAY THYROID STIM HORMONE: CPT

## 2019-09-03 PROCEDURE — 99214 OFFICE O/P EST MOD 30 MIN: CPT | Performed by: NURSE PRACTITIONER

## 2019-09-03 PROCEDURE — 80053 COMPREHEN METABOLIC PANEL: CPT

## 2019-09-03 PROCEDURE — 36415 COLL VENOUS BLD VENIPUNCTURE: CPT

## 2019-09-03 PROCEDURE — 82043 UR ALBUMIN QUANTITATIVE: CPT

## 2019-09-03 NOTE — PROGRESS NOTES
Assessment/Plan:    Patient Instructions     Type 2 diabetes mellitus under unknown control patient did labs this morning we will call with results needs 6 month follow-up     history of colon cancer due for colonoscopy next year     anxiety stable on Paxil and p r n  Xanax     coronary artery disease stable without angina continue to modify risk factors     hyperlipidemia/ diabetes labs pending call with results     mild retinopathy on iris from last year never evaluated by Ophthalmology urged the importance of seeing them and referral given     psoriasis recommend Dermatology he will consider     health maintenance recommend pneumonia shot he will consider declines flu shot     questionable carotid bruit on the right he states he recently had a carotid ultrasound I will try to obtain if I cannot find would recommend repeating         Diagnoses and all orders for this visit:    Type 2 diabetes mellitus without complication, without long-term current use of insulin ([facility] [address] )  -     IRIS Diabetic eye exam  -     CBC and differential; Future  -     Comprehensive metabolic panel; Future  -     Hemoglobin A1C; Future    Malignant neoplasm of colon, unspecified part of colon (HCC)    CAD S/P percutaneous coronary angioplasty  -     Lipid panel; Future    Essential hypertension    Mixed hyperlipidemia  -     TSH, 3rd generation with Free T4 reflex; Future    Major depressive disorder with single episode, in full remission ([facility] [address] )    Screening for prostate cancer  -     PSA, Total Screen; Future         Subjective:      Patient ID: Sarahi Mills is a 68 y o  male     Patient is here for routine 6 month visit, he just did labs this morning  He has no new physical concerns  He is active but no formal exercise  No chest pain shortness of breath  He does walk up stairs and hills without any symptoms  No urinary symptoms  Due for colonoscopy next year    Has psoriasis not bothersome but thinks maybe he should see dermatology, takes all medication as prescribed including blood pressure and cholesterol  Tries to follow a low sugar diet  No home blood pressures  Anxiety stable on Paxil and p r n  Xanax  Current Outpatient Medications:     ALPRAZolam (XANAX) 0 25 mg tablet, alprazolam 0 25 mg tablet, Disp: , Rfl:     aspirin 81 MG tablet, Take 81 mg by mouth daily  , Disp: , Rfl:     enalapril (VASOTEC) 5 mg tablet, TAKE ONE TABLET BY MOUTH EVERY DAY (Patient taking differently: TAKE 1/2 TABLET BY MOUTH EVERY DAY), Disp: 90 tablet, Rfl: 3    fenofibrate (TRICOR) 145 mg tablet, Take 1 tablet (145 mg total) by mouth daily, Disp: 90 tablet, Rfl: 3    metoprolol succinate (TOPROL-XL) 25 mg 24 hr tablet, Take 0 5 tablets (12 5 mg total) by mouth daily, Disp: 45 tablet, Rfl: 3    nitroglycerin (NITROSTAT) 0 4 mg SL tablet, Place 0 4 mg under the tongue every 5 (five) minutes as needed for chest pain, Disp: , Rfl:     omeprazole (PriLOSEC) 20 mg delayed release capsule, TAKE ONE CAPSULE BY MOUTH EVERY DAY, Disp: 30 capsule, Rfl: 2    PARoxetine (PAXIL) 30 mg tablet, TAKE 1 TABLET BY MOUTH ONCE DAILY, Disp: 30 tablet, Rfl: 5    simvastatin (ZOCOR) 40 mg tablet, Take 1 tablet (40 mg total) by mouth daily at bedtime, Disp: 90 tablet, Rfl: 2    triamcinolone (KENALOG) 0 1 % cream, Apply topically 2 (two) times a day, Disp: 80 g, Rfl: 2    Recent Results (from the past 1008 hour(s))   CBC and differential    Collection Time: 09/03/19  7:32 AM   Result Value Ref Range    WBC 5 88 4 31 - 10 16 Thousand/uL    RBC 5 11 3 88 - 5 62 Million/uL    Hemoglobin 14 4 12 0 - 17 0 g/dL    Hematocrit 44 3 36 5 - 49 3 %    MCV 87 82 - 98 fL    MCH 28 2 26 8 - 34 3 pg    MCHC 32 5 31 4 - 37 4 g/dL    RDW 13 1 11 6 - 15 1 %    MPV 10 2 8 9 - 12 7 fL    Platelets 968 402 - 498 Thousands/uL    nRBC 0 /100 WBCs    Neutrophils Relative 58 43 - 75 %    Immat GRANS % 0 0 - 2 %    Lymphocytes Relative 26 14 - 44 %    Monocytes Relative 11 4 - 12 % Eosinophils Relative 4 0 - 6 %    Basophils Relative 1 0 - 1 %    Neutrophils Absolute 3 40 1 85 - 7 62 Thousands/µL    Immature Grans Absolute 0 02 0 00 - 0 20 Thousand/uL    Lymphocytes Absolute 1 54 0 60 - 4 47 Thousands/µL    Monocytes Absolute 0 64 0 17 - 1 22 Thousand/µL    Eosinophils Absolute 0 23 0 00 - 0 61 Thousand/µL    Basophils Absolute 0 05 0 00 - 0 10 Thousands/µL   Comprehensive metabolic panel    Collection Time: 09/03/19  7:32 AM   Result Value Ref Range    Sodium 141 136 - 145 mmol/L    Potassium 4 1 3 5 - 5 3 mmol/L    Chloride 108 100 - 108 mmol/L    CO2 28 21 - 32 mmol/L    ANION GAP 5 4 - 13 mmol/L    BUN 13 5 - 25 mg/dL    Creatinine 1 30 0 60 - 1 30 mg/dL    Glucose, Fasting 102 (H) 65 - 99 mg/dL    Calcium 9 1 8 3 - 10 1 mg/dL    AST 29 5 - 45 U/L    ALT 37 12 - 78 U/L    Alkaline Phosphatase 44 (L) 46 - 116 U/L    Total Protein 7 1 6 4 - 8 2 g/dL    Albumin 3 9 3 5 - 5 0 g/dL    Total Bilirubin 0 62 0 20 - 1 00 mg/dL    eGFR 55 ml/min/1 73sq m   Lipid panel    Collection Time: 09/03/19  7:32 AM   Result Value Ref Range    Cholesterol 140 50 - 200 mg/dL    Triglycerides 117 <=150 mg/dL    HDL, Direct 36 (L) 40 - 60 mg/dL    LDL Calculated 81 0 - 100 mg/dL    Non-HDL-Chol (CHOL-HDL) 104 mg/dl   Hemoglobin A1C    Collection Time: 09/03/19  7:32 AM   Result Value Ref Range    Hemoglobin A1C 5 5 4 2 - 6 3 %     mg/dl   TSH, 3rd generation    Collection Time: 09/03/19  7:32 AM   Result Value Ref Range    TSH 3RD GENERATON 3 340 0 358 - 3 740 uIU/mL       The following portions of the patient's history were reviewed and updated as appropriate: allergies, current medications, past family history, past medical history, past social history, past surgical history and problem list      Review of Systems   Constitutional: Negative for appetite change, chills, diaphoresis, fatigue, fever and unexpected weight change  HENT: Negative for postnasal drip and sneezing      Eyes: Negative for visual disturbance  Respiratory: Negative for chest tightness and shortness of breath  Cardiovascular: Negative for chest pain, palpitations and leg swelling  Gastrointestinal: Negative for abdominal pain and blood in stool  Endocrine: Negative for cold intolerance, heat intolerance, polydipsia, polyphagia and polyuria  Genitourinary: Negative for difficulty urinating, dysuria, frequency and urgency  Musculoskeletal: Negative for arthralgias and myalgias  Skin: Negative for rash and wound  Neurological: Negative for dizziness, weakness, light-headedness and headaches  Hematological: Negative for adenopathy  Psychiatric/Behavioral: Negative for confusion, dysphoric mood and sleep disturbance  The patient is not nervous/anxious  Objective:      /70 (BP Location: Left arm, Patient Position: Sitting, Cuff Size: Standard)   Pulse (!) 54   Resp 12   Ht 5' 10" (1 778 m)   Wt 89 4 kg (197 lb)   BMI 28 27 kg/m²        Physical Exam   Constitutional: He is oriented to person, place, and time  He appears well-developed  No distress  HENT:   Head: Normocephalic and atraumatic  Nose: Nose normal    Mouth/Throat: Oropharynx is clear and moist    Eyes: Pupils are equal, round, and reactive to light  Conjunctivae and EOM are normal    Neck: Normal range of motion  Neck supple  No JVD present  No tracheal deviation present  No thyromegaly present  Cardiovascular: Normal rate, regular rhythm, normal heart sounds and intact distal pulses  Exam reveals no gallop and no friction rub  No murmur heard  Pulmonary/Chest: Effort normal and breath sounds normal  No respiratory distress  He has no wheezes  He has no rales  Abdominal: Soft  Bowel sounds are normal  He exhibits no distension  There is no tenderness  Musculoskeletal: Normal range of motion  He exhibits no edema  Lymphadenopathy:     He has no cervical adenopathy  Neurological: He is alert and oriented to person, place, and time  No cranial nerve deficit  Skin: Skin is warm and dry  No rash noted  He is not diaphoretic  Psychiatric: He has a normal mood and affect  His behavior is normal  Judgment and thought content normal    BMI Counseling: Body mass index is 28 27 kg/m²  Discussed the patient's BMI with him  The BMI is above average  BMI counseling and education was provided to the patient  Nutrition recommendations include reducing portion sizes

## 2019-09-03 NOTE — PATIENT INSTRUCTIONS
Type 2 diabetes mellitus under unknown control patient did labs this morning we will call with results needs 6 month follow-up     history of colon cancer due for colonoscopy next year     anxiety stable on Paxil and p r n   Xanax     coronary artery disease stable without angina continue to modify risk factors     hyperlipidemia/ diabetes labs pending call with results     mild retinopathy on iris from last year never evaluated by Ophthalmology urged the importance of seeing them and referral given     psoriasis recommend Dermatology he will consider     health maintenance recommend pneumonia shot he will consider declines flu shot     questionable carotid bruit on the right he states he recently had a carotid ultrasound I will try to obtain if I cannot find would recommend repeating

## 2019-09-03 NOTE — TELEPHONE ENCOUNTER
----- Message from Cyril Coon, [address] sent at 9/3/2019  2:29 PM EDT -----  Notify his labs look great, sugar number is totally normal  Urine test was normal  Keep up the good work- I will mail his labs to be done in 6 months     Also I can't find a recent carotid u/s so I did order one and I will mail that as well

## 2019-10-05 DIAGNOSIS — F32.A DEPRESSION, UNSPECIFIED DEPRESSION TYPE: ICD-10-CM

## 2019-10-07 RX ORDER — PAROXETINE 30 MG/1
TABLET, FILM COATED ORAL
Qty: 30 TABLET | Refills: 5 | Status: SHIPPED | OUTPATIENT
Start: 2019-10-07 | End: 2020-03-24

## 2020-01-21 ENCOUNTER — OFFICE VISIT (OUTPATIENT)
Dept: CARDIOLOGY CLINIC | Facility: CLINIC | Age: 70
End: 2020-01-21
Payer: MEDICARE

## 2020-01-21 VITALS
OXYGEN SATURATION: 96 % | HEIGHT: 70 IN | HEART RATE: 63 BPM | SYSTOLIC BLOOD PRESSURE: 134 MMHG | BODY MASS INDEX: 28.49 KG/M2 | DIASTOLIC BLOOD PRESSURE: 80 MMHG | WEIGHT: 199 LBS

## 2020-01-21 DIAGNOSIS — I10 ESSENTIAL HYPERTENSION: ICD-10-CM

## 2020-01-21 DIAGNOSIS — Z95.1 HX OF CABG: ICD-10-CM

## 2020-01-21 DIAGNOSIS — I25.10 CAD S/P PERCUTANEOUS CORONARY ANGIOPLASTY: Primary | ICD-10-CM

## 2020-01-21 DIAGNOSIS — E78.2 MIXED HYPERLIPIDEMIA: ICD-10-CM

## 2020-01-21 DIAGNOSIS — Z98.61 CAD S/P PERCUTANEOUS CORONARY ANGIOPLASTY: Primary | ICD-10-CM

## 2020-01-21 PROCEDURE — 99214 OFFICE O/P EST MOD 30 MIN: CPT | Performed by: INTERNAL MEDICINE

## 2020-01-21 NOTE — PROGRESS NOTES
CARDIOLOGY OFFICE VISIT  [facility]  [address], [address] [place] [address] [place]  Tel: [phone]      NAME: Jermaine Samano  AGE: 65 y o  SEX: male  : 1950   MRN: [id number]      Chief Complaint:  Chief Complaint   Patient presents with    Follow-up     6 months         History of Present Illness:   Patient comes for follow up  States he is doing well from cardiac stand point and denies chest pain / pressure, SOB, palpitations, lightheadedness, syncope, swelling feet, orthopnea, PND, claudication  CAD s/p PCI in , s/p CABG x 2 in  at [facility] -  States is doing well cardiac wise with no cardiac symptoms  Taking all medications regularly  Has not used SL NTG since the last clinic visit  HTN -  Has been hypertensive for many years  Taking medications regularly  Denies lightheadedness, headache, medication side effects  HLP -  Has had hyperlipidemia for many years  Taking statin regularly along with diet control  Denies myalgia  PCP closely monitoring the blood work  Past Medical History:  Past Medical History:   Diagnosis Date    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[facility]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    Allergic rhinitis     Anemia     Anxiety     Atherosclerosis     CAD (coronary artery disease)     Colitis     Depression     Diabetes mellitus (HCC)     Difficulty breathing     Gastritis     GERD (gastroesophageal reflux disease)     Hepatic cyst     Hyperlipidemia     Hypertension     IFG (impaired fasting glucose)     Myocardial infarction (HCC)     9 YEARS AGO    Psoriasis          Past Surgical History:  Past Surgical History:   Procedure Laterality Date    ANGIOPLASTY      COLON SURGERY      CORONARY ARTERY BYPASS GRAFT      KS COLONOSCOPY FLX DX W/COLLJ SPEC WHEN PFRMD N/A 2017    Procedure: COLONOSCOPY;  Surgeon: Sun Erickson MD;  Location: [facility];   Service: Gastroenterology    KS RMVL HEATHER CTR VAD W/SUBQ PORT/ CTR/PRPH INSJ Left 5/13/2016    Procedure: REMOVAL VENOUS PORT (PORT-A-CATH);   Surgeon: Zeke Porter MD;  Location: [facility];  Service: Colorectal         Family History:  Family History   Problem Relation Age of Onset    Colon cancer Family     Diverticulosis Family         colonic    Cancer Father          Social History:  Social History     Socioeconomic History    Marital status: /Civil Union     Spouse name: None    Number of children: None    Years of education: None    Highest education level: None   Occupational History    None   Social Needs    Financial resource strain: None    Food insecurity:     Worry: None     Inability: None    Transportation needs:     Medical: None     Non-medical: None   Tobacco Use    Smoking status: Never Smoker    Smokeless tobacco: Never Used   Substance and Sexual Activity    Alcohol use: Yes     Frequency: Monthly or less     Drinks per session: 1 or 2     Binge frequency: Never     Comment: social    Drug use: No    Sexual activity: Never   Lifestyle    Physical activity:     Days per week: 2 days     Minutes per session: 30 min    Stress: Not at all   Relationships    Social connections:     Talks on phone: None     Gets together: None     Attends Denominational service: None     Active member of club or organization: None     Attends meetings of clubs or organizations: None     Relationship status: None    Intimate partner violence:     Fear of current or ex partner: None     Emotionally abused: None     Physically abused: None     Forced sexual activity: None   Other Topics Concern    None   Social History Narrative    No advance directives         Active Problems:  Patient Active Problem List   Diagnosis    [facility]    CAD S/P percutaneous coronary angioplasty    Gastritis    Hypertension    Hyperlipidemia    Hx of CABG    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 ([facility] )    Major depressive disorder with single episode, [facility] full [facility]    Type 2 diabetes mellitus without complication, without long-term current use of insulin ([facility] )         The following portions of the patient's history were reviewed and updated as appropriate: past medical history, past surgical history, past family history,  past social history, current medications, allergies and problem list       Review of Systems:  Constitutional: Denies fever, chills, fatigue  Eyes: Denies eye redness, eye discharge, double vision  ENT: Denies hearing loss, tinnitus, sneezing, nasal discharge, sore throat   Respiratory: Denies cough, expectoration, hemoptysis, shortness of breath  Cardiovascular: Denies chest pain, palpitations, orthopnea, PND, lower extremity swelling  Gastrointestinal: Denies abdominal pain, nausea, vomiting, hematemesis, diarrhea, bloody stools  Genito-Urinary: Denies dysuria, incontinence  Musculoskeletal: Denies joint pain, muscle pain  Neurologic: Denies confusion, lightheadedness, syncope, headache, focal weakness, sensory changes, seizures  Endocrine: Denies polyuria, polydipsia, temperature intolerance  Allergy and Immunology: Denies hives, insect bite sensitivity  Hematological and Lymphatic: Denies bleeding problems, swollen glands   Psychological: Denies depression, suicidal ideation, anxiety, panic  Dermatological: Denies pruritus, rash, skin lesion changes      Vitals:  Vitals:    01/21/20 1311   BP: 134/80   Pulse: 63   SpO2: 96%       Body mass index is 28 55 kg/m²  Weight (last 2 days)     Date/Time   Weight    01/21/20 1311   90 3 (199)                Physical Examination:  General: Patient is not in acute distress  Awake, alert, oriented in time, place and person  Responding to commands  Head: Normocephalic  Atraumatic  Eyes: Both pupils normal sized, round and reactive to light  Nonicteric  ENT: Normal external ear canals  Nares normal, no drainage  Lips and oral mucosa normal  Neck: Supple   JVP not raised  Trachea central  No thyromegaly  Lungs: Bilateral bronchovascular breath sounds with no crackles or rhonchi  Chest wall: No tenderness  Cardiovascular: RRR  S1 and S2 normal  No murmur, rub or gallop  Gastrointestinal: Abdomen soft, nontender  No guarding or rigidity  Liver and spleen not palpable  Bowel sounds present  Neurologic: Patient is awake, alert, oriented in time, place and person  Responding to command  Moving all extremities  Integumentary:  No skin rash  Lymphatic: No cervical lymphadenopathy  Back: Symmetric   No CVA tenderness  Extremities: No clubbing, cyanosis or edema      Laboratory Results:  CBC with diff:   Lab Results   Component Value Date    WBC 5 88 09/03/2019    WBC 6 1 03/16/2015    RBC 5 11 09/03/2019    RBC 4 91 03/16/2015    HGB 14 4 09/03/2019    HGB 14 7 03/16/2015    HCT 44 3 09/03/2019    HCT 42 0 03/16/2015    MCV 87 09/03/2019    MCV 85 03/16/2015    MCH 28 2 09/03/2019    MCH 29 9 03/16/2015    RDW 13 1 09/03/2019    RDW 12 0 03/16/2015     09/03/2019     03/16/2015       CMP:  Lab Results   Component Value Date    CREATININE 1 30 09/03/2019    CREATININE 1 1 03/16/2015    BUN 13 09/03/2019    BUN 12 03/16/2015     (H) 03/16/2015    K 4 1 09/03/2019    K 4 0 03/16/2015     09/03/2019     (H) 03/16/2015    CO2 28 09/03/2019    CO2 29 4 03/16/2015    GLUCOSE 104 03/16/2015    PROT 6 8 03/16/2015    ALKPHOS 44 (L) 09/03/2019    ALKPHOS 89 03/16/2015    ALT 37 09/03/2019    ALT 43 03/16/2015    AST 29 09/03/2019    AST 23 03/16/2015    BILIDIR 0 19 09/07/2018       Lab Results   Component Value Date    HGBA1C 5 5 09/03/2019    HGBA1C 5 1 03/16/2015    MG 2 0 04/01/2014    PHOS 2 4 (L) 04/01/2014         Lipid Profile:   Lab Results   Component Value Date    CHOL 129 03/16/2015    CHOL 148 02/12/2014     Lab Results   Component Value Date    HDL 36 (L) 09/03/2019    HDL 36 (L) 02/23/2019    HDL 35 (L) 09/07/2018     Lab Results   Component Value Date    LDLCALC 81 09/03/2019    LDLCALC 72 02/23/2019    LDLCALC 74 09/07/2018     Lab Results   Component Value Date    TRIG 117 09/03/2019    TRIG 111 02/23/2019    TRIG 162 (H) 09/07/2018       Medications:    Current Outpatient Medications:     aspirin 81 MG tablet, Take 81 mg by mouth daily  , Disp: , Rfl:     enalapril (VASOTEC) 5 mg tablet, TAKE ONE TABLET BY MOUTH EVERY DAY (Patient taking differently: TAKE 1/2 TABLET BY MOUTH EVERY DAY), Disp: 90 tablet, Rfl: 3    fenofibrate (TRICOR) 145 mg tablet, Take 1 tablet (145 mg total) by mouth daily, Disp: 90 tablet, Rfl: 3    metoprolol succinate (TOPROL-XL) 25 mg 24 hr tablet, Take 0 5 tablets (12 5 mg total) by mouth daily, Disp: 45 tablet, Rfl: 3    omeprazole (PriLOSEC) 20 mg delayed release capsule, TAKE ONE CAPSULE BY MOUTH EVERY DAY, Disp: 30 capsule, Rfl: 2    PARoxetine (PAXIL) 30 mg tablet, TAKE 1 TABLET BY MOUTH ONCE DAILY, Disp: 30 tablet, Rfl: 5    simvastatin (ZOCOR) 40 mg tablet, Take 1 tablet (40 mg total) by mouth daily at bedtime, Disp: 90 tablet, Rfl: 2    ALPRAZolam (XANAX) 0 25 mg tablet, alprazolam 0 25 mg tablet, Disp: , Rfl:     nitroglycerin (NITROSTAT) 0 4 mg SL tablet, Place 0 4 mg under the tongue every 5 (five) minutes as needed for chest pain, Disp: , Rfl:     triamcinolone (KENALOG) 0 1 % cream, Apply topically 2 (two) times a day (Patient not taking: Reported on 1/21/2020), Disp: 80 g, Rfl: 2      Allergies: Allergies   Allergen Reactions    Penicillins Hives         Assessment and Plan:  1  CAD S/P percutaneous coronary angioplasty Hx of CABG  Stable  Continue aspirin, statin, fenofibrate, ACE-inhibitor, beta-blocker, p r n  SL NTG  Echo ordered for EF evaluation  It has been about 6 years since the last echo    2  Essential hypertension  BP stable  Continue current medication    3   Mixed hyperlipidemia  Continue statin and diet control    Recommend aggressive risk factor modification and therapeutic lifestyle changes  Low-salt, low-calorie, low-fat, low-cholesterol diet with regular exercise and to optimize weight  I will defer the ordering and monitoring of necessity lab studies to you, but I am available and happy to review and manage any of the data at your request in the future  Discussed concepts of atherosclerosis, including signs and symptoms of cardiac disease  Previous studies were reviewed  Safety measures were reviewed  Questions were entertained and answered  Patient was advised to report any problems requiring medical attention  Follow-up with PCP and appropriate specialist and lab work as discussed  Return for follow up visit as scheduled or earlier, if needed  Thank you for allowing me to participate in the care and evaluation of your patient  Should you have any questions, please feel free to contact me        Ofelia Wills MD  1/21/2020,1:19 PM

## 2020-01-24 DIAGNOSIS — E78.49 OTHER HYPERLIPIDEMIA: ICD-10-CM

## 2020-01-24 DIAGNOSIS — E78.5 HYPERLIPIDEMIA, UNSPECIFIED HYPERLIPIDEMIA TYPE: ICD-10-CM

## 2020-01-24 DIAGNOSIS — I10 HYPERTENSION, UNSPECIFIED TYPE: ICD-10-CM

## 2020-01-24 RX ORDER — FENOFIBRATE 145 MG/1
TABLET, COATED ORAL
Qty: 90 TABLET | Refills: 0 | Status: SHIPPED | OUTPATIENT
Start: 2020-01-24 | End: 2020-04-30

## 2020-01-24 RX ORDER — SIMVASTATIN 40 MG
TABLET ORAL
Qty: 90 TABLET | Refills: 0 | Status: SHIPPED | OUTPATIENT
Start: 2020-01-24 | End: 2020-05-04

## 2020-01-24 RX ORDER — ENALAPRIL MALEATE 5 MG/1
2.5 TABLET ORAL DAILY
Qty: 30 TABLET | Refills: 2 | Status: SHIPPED | OUTPATIENT
Start: 2020-01-24 | End: 2020-06-22 | Stop reason: SDUPTHER

## 2020-01-28 ENCOUNTER — HOSPITAL ENCOUNTER (OUTPATIENT)
Dept: NON INVASIVE DIAGNOSTICS | Facility: CLINIC | Age: 70
Discharge: HOME | End: 2020-01-28
Payer: MEDICARE

## 2020-01-28 DIAGNOSIS — R09.89 BRUIT: ICD-10-CM

## 2020-01-28 DIAGNOSIS — Z98.61 CAD S/P PERCUTANEOUS CORONARY ANGIOPLASTY: ICD-10-CM

## 2020-01-28 DIAGNOSIS — I25.10 CAD S/P PERCUTANEOUS CORONARY ANGIOPLASTY: ICD-10-CM

## 2020-01-28 PROCEDURE — 93880 EXTRACRANIAL BILAT STUDY: CPT | Performed by: SURGERY

## 2020-01-28 PROCEDURE — 93880 EXTRACRANIAL BILAT STUDY: CPT

## 2020-01-28 PROCEDURE — 93306 TTE W/DOPPLER COMPLETE: CPT | Performed by: INTERNAL MEDICINE

## 2020-01-28 PROCEDURE — 93306 TTE W/DOPPLER COMPLETE: CPT

## 2020-01-30 ENCOUNTER — TELEPHONE (OUTPATIENT)
Dept: INTERNAL MEDICINE CLINIC | Facility: CLINIC | Age: 70
End: 2020-01-30

## 2020-01-30 DIAGNOSIS — R09.89 BRUIT: Primary | ICD-10-CM

## 2020-01-30 NOTE — TELEPHONE ENCOUNTER
----- Message from Sarah Plummer sent at 1/30/2020 10:31 AM EST -----  His carotid u/s was good he has a small amount of plaque on the left side (this is normal) - none on the left side  We will just monitor this every 1- 2 years

## 2020-02-11 ENCOUNTER — OFFICE VISIT (OUTPATIENT)
Dept: INTERNAL MEDICINE CLINIC | Facility: CLINIC | Age: 70
End: 2020-02-11
Payer: MEDICARE

## 2020-02-11 ENCOUNTER — TELEPHONE (OUTPATIENT)
Dept: INTERNAL MEDICINE CLINIC | Facility: CLINIC | Age: 70
End: 2020-02-11

## 2020-02-11 VITALS
HEIGHT: 70 IN | DIASTOLIC BLOOD PRESSURE: 82 MMHG | HEART RATE: 60 BPM | BODY MASS INDEX: 27.77 KG/M2 | RESPIRATION RATE: 12 BRPM | SYSTOLIC BLOOD PRESSURE: 140 MMHG | WEIGHT: 194 LBS

## 2020-02-11 DIAGNOSIS — R21 RASH: ICD-10-CM

## 2020-02-11 DIAGNOSIS — G47.33 OSA (OBSTRUCTIVE SLEEP APNEA): ICD-10-CM

## 2020-02-11 DIAGNOSIS — L40.9 PSORIASIS: ICD-10-CM

## 2020-02-11 DIAGNOSIS — I10 ESSENTIAL HYPERTENSION: ICD-10-CM

## 2020-02-11 DIAGNOSIS — R06.00 PND (PAROXYSMAL NOCTURNAL DYSPNEA): ICD-10-CM

## 2020-02-11 DIAGNOSIS — R09.89 GLOBUS SENSATION: ICD-10-CM

## 2020-02-11 DIAGNOSIS — R05.9 COUGH: Primary | ICD-10-CM

## 2020-02-11 PROCEDURE — 1160F RVW MEDS BY RX/DR IN RCRD: CPT | Performed by: NURSE PRACTITIONER

## 2020-02-11 PROCEDURE — 99214 OFFICE O/P EST MOD 30 MIN: CPT | Performed by: NURSE PRACTITIONER

## 2020-02-11 PROCEDURE — 3079F DIAST BP 80-89 MM HG: CPT | Performed by: NURSE PRACTITIONER

## 2020-02-11 PROCEDURE — 4040F PNEUMOC VAC/ADMIN/RCVD: CPT | Performed by: NURSE PRACTITIONER

## 2020-02-11 PROCEDURE — 1036F TOBACCO NON-USER: CPT | Performed by: NURSE PRACTITIONER

## 2020-02-11 PROCEDURE — 3077F SYST BP >= 140 MM HG: CPT | Performed by: NURSE PRACTITIONER

## 2020-02-11 PROCEDURE — 3008F BODY MASS INDEX DOCD: CPT | Performed by: NURSE PRACTITIONER

## 2020-02-11 RX ORDER — FLUTICASONE PROPIONATE 50 MCG
2 SPRAY, SUSPENSION (ML) NASAL DAILY
Qty: 16 G | Refills: 0 | Status: SHIPPED | OUTPATIENT
Start: 2020-02-11 | End: 2020-03-30

## 2020-02-11 RX ORDER — TRIAMCINOLONE ACETONIDE 1 MG/G
CREAM TOPICAL AS NEEDED
Qty: 15 G | Refills: 0 | Status: SHIPPED | OUTPATIENT
Start: 2020-02-11 | End: 2021-03-31

## 2020-02-11 RX ORDER — METOPROLOL SUCCINATE 25 MG/1
25 TABLET, EXTENDED RELEASE ORAL DAILY
Qty: 30 TABLET | Refills: 3
Start: 2020-02-11 | End: 2020-06-29 | Stop reason: SDUPTHER

## 2020-02-11 NOTE — PROGRESS NOTES
Assessment/Plan:    Patient Instructions    Rare transient PND  Differential diagnosis discussed with patient including postnasal drip verses GERD versus obstructive sleep apnea versus congestive heart failure, he   States he snores occasionally  wife  never mentioned that there was a witnessed apnea, there is no evidence of decompensated heart failure  Does complain of postnasal drip and heartburn takes omeprazole daily  Recommend that he not eat within 2 hours of going to bed and keep head of bed elevated  We will aggressively treat sinuses recommend humidification, Flonase and Claritin  I discussed with the patient that because his symptoms are so rare it is difficult to determine what the cause maybe, he states it may be happens 4 times a year  At this time I discussed the treatment plan as above, I will also start the process for home sleep study based on he does have a snoring history  Could consider chest x-ray, PFTs patient would like to be evaluated by ENT so referral has been placed  Will follow back in approximately 2 weeks sooner if needed of note she does follow with cardiology recently had echocardiogram that did show grade 2 diastolic dysfunction  Again there is no evidence of heart failure could consider low-dose diuretic     psoriasis refer to Dermatology         Diagnoses and all orders for this visit:    Cough  -     fluticasone (FLONASE) 50 mcg/act nasal spray; 2 sprays into each nostril daily    Rash  -     triamcinolone (KENALOG) 0 1 % cream; Apply topically as needed for irritation    Essential hypertension  -     metoprolol succinate (TOPROL-XL) 25 mg 24 hr tablet; Take 1 tablet (25 mg total) by mouth daily    HUMBERTO (obstructive sleep apnea)  -     Home Study; Future    Globus sensation  -     Ambulatory Referral to Otolaryngology; Future    PND (paroxysmal nocturnal dyspnea)    Psoriasis  -     Ambulatory referral to Dermatology;  Future         Subjective:      Patient ID: Sandy Nolandck Savita Taylor is a 65 y o  male     Patient states recently he woke up from sleep with the inability to breathe  He feels like there is a choking sensation in his throat  He has to jump up out of his recliner go outside and breathe cold air to get the symptoms really to resolve  States it took about an hour before he felt like himself again  He states he has had this in the past   It may happen 4 times a year  In the past did last so long  He does does note specifically this time that he did eat a donut right before going to bed he notes he generally sleeps in the recliner almost never sleeps in his bed  His wife notes occasionally he will snore she has never stated that she he stops breathing when he is sleeping  Drinks alcohol rarely  Does have heartburn but is well controlled with omeprazole  No other shortness of breath cough or wheeze  No lower extremity swelling  Does follow with Cardiology  No smoking history  No history of asthma  Current Outpatient Medications:     aspirin 81 MG tablet, Take 81 mg by mouth daily  , Disp: , Rfl:     enalapril (VASOTEC) 5 mg tablet, Take 0 5 tablets (2 5 mg total) by mouth daily, Disp: 30 tablet, Rfl: 2    fenofibrate (TRICOR) 145 mg tablet, TAKE ONE TABLET BY MOUTH EVERY DAY, Disp: 90 tablet, Rfl: 0    metoprolol succinate (TOPROL-XL) 25 mg 24 hr tablet, Take 1 tablet (25 mg total) by mouth daily, Disp: 30 tablet, Rfl: 3    nitroglycerin (NITROSTAT) 0 4 mg SL tablet, Place 0 4 mg under the tongue every 5 (five) minutes as needed for chest pain, Disp: , Rfl:     omeprazole (PriLOSEC) 20 mg delayed release capsule, TAKE ONE CAPSULE BY MOUTH EVERY DAY, Disp: 30 capsule, Rfl: 2    PARoxetine (PAXIL) 30 mg tablet, TAKE 1 TABLET BY MOUTH ONCE DAILY, Disp: 30 tablet, Rfl: 5    simvastatin (ZOCOR) 40 mg tablet, TAKE ONE TABLET BY MOUTH EVERY DAY AT BEDTIME, Disp: 90 tablet, Rfl: 0    triamcinolone (KENALOG) 0 1 % cream, Apply topically as needed for irritation, Disp: 15 g, Rfl: 0    ALPRAZolam (XANAX) 0 25 mg tablet, alprazolam 0 25 mg tablet, Disp: , Rfl:     fluticasone (FLONASE) 50 mcg/act nasal spray, 2 sprays into each nostril daily, Disp: 16 g, Rfl: 0    No results found for this or any previous visit (from the past 1008 hour(s))  The following portions of the patient's history were reviewed and updated as appropriate: allergies, current medications, past family history, past medical history, past social history, past surgical history and problem list      Review of Systems   Constitutional: Negative for appetite change, chills, diaphoresis, fatigue, fever and unexpected weight change  HENT: Negative for postnasal drip and sneezing  Eyes: Negative for visual disturbance  Respiratory: Negative for chest tightness and shortness of breath  Choking at night   Cardiovascular: Negative for chest pain, palpitations and leg swelling  Gastrointestinal: Negative for abdominal pain and blood in stool  Endocrine: Negative for cold intolerance, heat intolerance, polydipsia, polyphagia and polyuria  Genitourinary: Negative for difficulty urinating, dysuria, frequency and urgency  Musculoskeletal: Negative for arthralgias and myalgias  Skin: Negative for rash and wound  Neurological: Negative for dizziness, weakness, light-headedness and headaches  Hematological: Negative for adenopathy  Psychiatric/Behavioral: Negative for confusion, dysphoric mood and sleep disturbance  The patient is not nervous/anxious  Objective:      /82 (BP Location: Left arm, Patient Position: Sitting)   Pulse 60   Resp 12   Ht 5' 10" (1 778 m)   Wt 88 kg (194 lb)   BMI 27 84 kg/m²        Physical Exam   Constitutional: He is oriented to person, place, and time  He appears well-developed  No distress  HENT:   Head: Normocephalic and atraumatic     Nose: Nose normal    Mouth/Throat: Oropharynx is clear and moist    Eyes: Pupils are equal, round, and reactive to light  Conjunctivae and EOM are normal    Neck: Normal range of motion  Neck supple  No JVD present  No tracheal deviation present  No thyromegaly present  Cardiovascular: Normal rate, regular rhythm, normal heart sounds and intact distal pulses  Exam reveals no gallop and no friction rub  No murmur heard  Pulmonary/Chest: Effort normal and breath sounds normal  No respiratory distress  He has no wheezes  He has no rales  Abdominal: Soft  Bowel sounds are normal  He exhibits no distension  There is no tenderness  Musculoskeletal: Normal range of motion  He exhibits no edema  Lymphadenopathy:     He has no cervical adenopathy  Neurological: He is alert and oriented to person, place, and time  No cranial nerve deficit  Skin: Skin is warm and dry  No rash noted  He is not diaphoretic  Psychiatric: He has a normal mood and affect  His behavior is normal  Judgment and thought content normal    BMI Counseling: Body mass index is 27 84 kg/m²  The BMI is above normal  Nutrition recommendations include decreasing portion sizes and limiting drinks that contain sugar  Exercise recommendations include exercising 3-5 times per week

## 2020-02-11 NOTE — TELEPHONE ENCOUNTER
Dr Severa Kay  #: [phone]  [address] [place], [place] [address]bandar Hernandes, [address] [place] [address]    Appt: 02/20/2020 @ 1:00PM      Dr Luna Marquis  #: [phone]    Appt: 02/12/2020    Patient informed

## 2020-02-11 NOTE — PATIENT INSTRUCTIONS
Rare transient PND  Differential diagnosis discussed with patient including postnasal drip verses GERD versus obstructive sleep apnea versus congestive heart failure, he   States he snores occasionally  wife  never mentioned that there was a witnessed apnea, there is no evidence of decompensated heart failure  Does complain of postnasal drip and heartburn takes omeprazole daily  Recommend that he not eat within 2 hours of going to bed and keep head of bed elevated  We will aggressively treat sinuses recommend humidification, Flonase and Claritin  I discussed with the patient that because his symptoms are so rare it is difficult to determine what the cause maybe, he states it may be happens 4 times a year  At this time I discussed the treatment plan as above, I will also start the process for home sleep study based on he does have a snoring history  Could consider chest x-ray, PFTs patient would like to be evaluated by ENT so referral has been placed    Will follow back in approximately 2 weeks sooner if needed of note she does follow with cardiology recently had echocardiogram that did show grade 2 diastolic dysfunction  Again there is no evidence of heart failure could consider low-dose diuretic     psoriasis refer to Dermatology

## 2020-02-12 ENCOUNTER — OFFICE VISIT (OUTPATIENT)
Dept: DERMATOLOGY | Facility: CLINIC | Age: 70
End: 2020-02-12
Payer: MEDICARE

## 2020-02-12 DIAGNOSIS — L40.9 PSORIASIS: Primary | ICD-10-CM

## 2020-02-12 DIAGNOSIS — Z13.89 SCREENING FOR SKIN CONDITION: ICD-10-CM

## 2020-02-12 PROCEDURE — 3077F SYST BP >= 140 MM HG: CPT | Performed by: DERMATOLOGY

## 2020-02-12 PROCEDURE — 1036F TOBACCO NON-USER: CPT | Performed by: DERMATOLOGY

## 2020-02-12 PROCEDURE — 4040F PNEUMOC VAC/ADMIN/RCVD: CPT | Performed by: DERMATOLOGY

## 2020-02-12 PROCEDURE — 99203 OFFICE O/P NEW LOW 30 MIN: CPT | Performed by: DERMATOLOGY

## 2020-02-12 PROCEDURE — 3079F DIAST BP 80-89 MM HG: CPT | Performed by: DERMATOLOGY

## 2020-02-12 PROCEDURE — 1160F RVW MEDS BY RX/DR IN RCRD: CPT | Performed by: DERMATOLOGY

## 2020-02-12 RX ORDER — CALCIPOTRIENE 50 UG/G
OINTMENT TOPICAL DAILY
Qty: 60 G | Refills: 1 | Status: SHIPPED | OUTPATIENT
Start: 2020-02-12 | End: 2020-03-16

## 2020-02-12 RX ORDER — BETAMETHASONE DIPROPIONATE 0.5 MG/G
OINTMENT TOPICAL DAILY
Qty: 50 G | Refills: 2 | Status: SHIPPED | OUTPATIENT
Start: 2020-02-12 | End: 2020-03-25 | Stop reason: SDUPTHER

## 2020-02-12 NOTE — PROGRESS NOTES
[address] [facility] DERMATOLOGY  [address] [place] [id number]  [phone]  [phone]     MRN: [id number] : 1950  Encounter: [id number]  Patient Information: Christopher Monroy  Chief complaint:  Psoriasis    History of present illness:  73-year-old male with previous history of adenocarcinoma large intestines presents for overall skin check patient with history of psoriasis patient has had this for over 30 years has not been using much therapy for this except for some topical steroid recently given to him by Dr Natacha Proctor  Past Medical History:   Diagnosis Date    Adenocarcinoma [facility])     large intestine    Allergic rhinitis     Anemia     Anxiety     Atherosclerosis     CAD (coronary artery disease)     Colitis     Depression     Diabetes mellitus ([facility] )     Difficulty breathing     Gastritis     GERD (gastroesophageal reflux disease)     Hepatic cyst     Hyperlipidemia     Hypertension     IFG (impaired fasting glucose)     Myocardial infarction ([facility] )     9 YEARS AGO    Psoriasis      Past Surgical History:   Procedure Laterality Date    ANGIOPLASTY      COLON SURGERY      CORONARY ARTERY BYPASS GRAFT      [place] COLONOSCOPY FLX DX W/COLLJ SPEC WHEN PFRMD N/A 2017    Procedure: COLONOSCOPY;  Surgeon: Avery Fields MD;  Location: [place] [facility]; Service: Gastroenterology    [place] RMVL HEATHER CTR VAD W/SUBQ PORT/ CTR/PRPH INSJ Left 2016    Procedure: REMOVAL VENOUS PORT (PORT-A-CATH);   Surgeon: Angela Hernandez MD;  Location: [facility] OR;  Service: Colorectal     Social History   Social History     Substance and Sexual Activity   Alcohol Use Yes    Frequency: Monthly or less    Drinks per session: 1 or 2    Binge frequency: Never    Comment: social     Social History     Substance and Sexual Activity   Drug Use No     Social History     Tobacco Use   Smoking Status Never Smoker   Smokeless Tobacco Never Used     Family History   Problem Relation Age of Onset    Colon cancer Family     Diverticulosis Family         colonic    Cancer Father      Meds/Allergies   Allergies   Allergen Reactions    Penicillins Hives       Meds:  Prior to Admission medications    Medication Sig Start Date End Date Taking? Authorizing Provider   ALPRAZolam Monisha Ree) 0 25 mg tablet alprazolam 0 25 mg tablet   Yes Historical Provider, MD   aspirin 81 MG tablet Take 81 mg by mouth daily     Yes Historical Provider, MD   enalapril (VASOTEC) 5 mg tablet Take 0 5 tablets (2 5 mg total) by mouth daily 1/24/20  Yes YONATAN Carson   fenofibrate (TRICOR) 145 mg tablet TAKE ONE TABLET BY MOUTH EVERY DAY 1/24/20  Yes YONATAN Carson   fluticasone [facility]) 50 mcg/act nasal spray 2 sprays into each nostril daily 2/11/20  Yes YONATAN Carson   metoprolol succinate (TOPROL-XL) 25 mg 24 hr tablet Take 1 tablet (25 mg total) by mouth daily 2/11/20  Yes YONATAN Carson   nitroglycerin (NITROSTAT) 0 4 mg SL tablet Place 0 4 mg under the tongue every 5 (five) minutes as needed for chest pain   Yes Historical Provider, MD   omeprazole (PriLOSEC) 20 mg delayed release capsule TAKE ONE CAPSULE BY MOUTH EVERY DAY 8/4/19  Yes YONATAN Carson   PARoxetine (PAXIL) 30 mg tablet TAKE 1 TABLET BY MOUTH ONCE DAILY 10/7/19  Yes YONATAN Carson   simvastatin (ZOCOR) 40 mg tablet TAKE ONE TABLET BY MOUTH EVERY DAY AT BEDTIME 1/24/20  Yes YONATAN Carson   triamcinolone (KENALOG) 0 1 % cream Apply topically as needed for irritation 2/11/20  Yes YONATAN Carson       Subjective:     Review of Systems:    General: negative for - chills, fatigue, fever,  weight gain or weight loss  Psychological: negative for - anxiety, behavioral disorder, concentration difficulties, decreased libido, depression, irritability, memory difficulties, mood swings, sleep disturbances or suicidal ideation  ENT: negative for - hearing difficulties , nasal congestion, nasal discharge, oral lesions, sinus pain, sneezing, sore throat  Allergy and Immunology: negative for - hives, insect bite sensitivity,  Hematological and Lymphatic: negative for - bleeding problems, blood clots,bruising, swollen lymph nodes  Endocrine: negative for - hair pattern changes, hot flashes, malaise/lethargy, mood swings, palpitations, polydipsia/polyuria, skin changes, temperature intolerance or unexpected weight change  Respiratory: negative for - cough, hemoptysis, orthopnea, shortness of breath, or wheezing  Cardiovascular: negative for - chest pain, dyspnea on exertion, edema,  Gastrointestinal: negative for - abdominal pain, nausea/vomiting  Genito-Urinary: negative for - dysuria, incontinence, irregular/heavy menses or urinary frequency/urgency  Musculoskeletal: negative for - gait disturbance, joint pain, joint stiffness, joint swelling, muscle pain, muscular weakness  Dermatological:  As in HPI  Neurological: negative for confusion, dizziness, headaches, impaired coordination/balance, memory loss, numbness/tingling, seizures, speech problems, tremors or weakness       Objective: There were no vitals taken for this visit  Physical Exam:    General Appearance:    Alert, cooperative, no distress   Head:    Normocephalic, without obvious abnormality, atraumatic           Skin:   A full skin exam was performed including scalp, head scalp, eyes, ears, nose, lips, neck, chest, axilla, abdomen, back, buttocks, bilateral upper extremities, bilateral lower extremities, hands, feet, fingers, toes, fingernails, and toenails erythema scaling well-demarcated patches and plaques noted on 10% of body surface area involvement noted mainly on the scalp little bit on the face on little bit on the flanks and buttocks and lower legs and elbows     Assessment:     1  Psoriasis  Ambulatory referral to Dermatology    calcipotriene (DOVONOX) 0 005 % ointment    betamethasone, augmented, (DIPROLENE) 0 05 % ointment   2   Screening for skin condition           Plan:   Psoriasis  Patient advised that this is an autoimmune process where the immune system attacks skin and causes the skin to grow more quickly normally replace of skin cells every 5 for 6 weeks we have psoriasis we replace skin cells every 5 or 6 days  Screening for Dermatologic Disorders: Nothing else of concern noted on complete exam follow up in 1 year       Jermaine Dudley MD  2/12/2020,2:28 PM    Portions of the record may have been created with voice recognition software   Occasional wrong word or "sound a like" substitutions may have occurred due to the inherent limitations of voice recognition software   Read the chart carefully and recognize, using context, where substitutions have occurred

## 2020-03-15 DIAGNOSIS — L40.9 PSORIASIS: ICD-10-CM

## 2020-03-16 RX ORDER — CALCIPOTRIENE 50 UG/G
OINTMENT TOPICAL
Qty: 60 G | Refills: 1 | Status: SHIPPED | OUTPATIENT
Start: 2020-03-16 | End: 2020-03-25 | Stop reason: SDUPTHER

## 2020-03-18 DIAGNOSIS — K21.9 GASTROESOPHAGEAL REFLUX DISEASE WITHOUT ESOPHAGITIS: ICD-10-CM

## 2020-03-18 RX ORDER — OMEPRAZOLE 20 MG/1
CAPSULE, DELAYED RELEASE ORAL
Qty: 30 CAPSULE | Refills: 2 | Status: SHIPPED | OUTPATIENT
Start: 2020-03-18 | End: 2020-06-09 | Stop reason: SDUPTHER

## 2020-03-24 DIAGNOSIS — F32.A DEPRESSION, UNSPECIFIED DEPRESSION TYPE: ICD-10-CM

## 2020-03-24 RX ORDER — PAROXETINE 30 MG/1
TABLET, FILM COATED ORAL
Qty: 30 TABLET | Refills: 5 | Status: SHIPPED | OUTPATIENT
Start: 2020-03-24 | End: 2020-09-16 | Stop reason: SDUPTHER

## 2020-03-25 ENCOUNTER — OFFICE VISIT (OUTPATIENT)
Dept: DERMATOLOGY | Facility: CLINIC | Age: 70
End: 2020-03-25
Payer: MEDICARE

## 2020-03-25 VITALS — TEMPERATURE: 98.1 F

## 2020-03-25 DIAGNOSIS — L40.9 PSORIASIS: Primary | ICD-10-CM

## 2020-03-25 PROCEDURE — 3077F SYST BP >= 140 MM HG: CPT | Performed by: DERMATOLOGY

## 2020-03-25 PROCEDURE — 1160F RVW MEDS BY RX/DR IN RCRD: CPT | Performed by: DERMATOLOGY

## 2020-03-25 PROCEDURE — 4040F PNEUMOC VAC/ADMIN/RCVD: CPT | Performed by: DERMATOLOGY

## 2020-03-25 PROCEDURE — 99213 OFFICE O/P EST LOW 20 MIN: CPT | Performed by: DERMATOLOGY

## 2020-03-25 PROCEDURE — 1036F TOBACCO NON-USER: CPT | Performed by: DERMATOLOGY

## 2020-03-25 PROCEDURE — 3079F DIAST BP 80-89 MM HG: CPT | Performed by: DERMATOLOGY

## 2020-03-25 RX ORDER — CALCIPOTRIENE 50 UG/G
OINTMENT TOPICAL 2 TIMES DAILY
Qty: 60 G | Refills: 3 | Status: SHIPPED | OUTPATIENT
Start: 2020-03-25 | End: 2021-01-14 | Stop reason: SDUPTHER

## 2020-03-25 RX ORDER — BETAMETHASONE DIPROPIONATE 0.5 MG/G
OINTMENT TOPICAL DAILY
Qty: 50 G | Refills: 2 | Status: SHIPPED | OUTPATIENT
Start: 2020-03-25 | End: 2021-10-23

## 2020-03-25 NOTE — PATIENT INSTRUCTIONS
Marked improvement continue same therapy we will plan follow-up again in 6 months earlier if necessary patient also advised to get some sun exposure as this hopefully will help this

## 2020-03-25 NOTE — PROGRESS NOTES
[address] [facility] DERMATOLOGY  [address] [place] [id number]  [phone]  [phone]     MRN: [id number] : 1950  Encounter: [id number]  Patient Information: Tracy Guor  Chief complaint:  Follow-up for psoriasis    History of present illness:  25-year-old male presents for follow-up for psoriasis doing well with both the use of the calcipotriene and betamethasone dipropionate augmented areas are clearing nicely no problems noted  Past Medical History:   Diagnosis Date    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[facility]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    Allergic rhinitis     Anemia     Anxiety     Atherosclerosis     CAD (coronary artery disease)     Colitis     Depression     Diabetes mellitus ([facility] )     Difficulty breathing     Gastritis     GERD (gastroesophageal reflux disease)     Hepatic cyst     Hyperlipidemia     Hypertension     IFG (impaired fasting glucose)     Myocardial infarction ([facility])     9 YEARS AGO    Psoriasis      Past Surgical History:   Procedure Laterality Date    ANGIOPLASTY      COLON SURGERY      CORONARY ARTERY BYPASS GRAFT      [place] COLONOSCOPY FLX DX W/COLLJ SPEC WHEN PFRMD N/A 2017    Procedure: COLONOSCOPY;  Surgeon: Serenity Gamble MD;  Location: [facility]; Service: Gastroenterology    [place] RMVL HEATHER CTR VAD W/SUBQ PORT/ CTR/PRPH INSJ Left 2016    Procedure: REMOVAL VENOUS PORT (PORT-A-CATH);   Surgeon: Samira Recio MD;  Location:  MAIN OR;  Service: Colorectal     Social History   Social History     Substance and Sexual Activity   Alcohol Use Yes    Frequency: Monthly or less    Drinks per session: 1 or 2    Binge frequency: Never    Comment: social     Social History     Substance and Sexual Activity   Drug Use No     Social History     Tobacco Use   Smoking Status Never Smoker   Smokeless Tobacco Never Used     Family History   Problem Relation Age of Onset    Colon cancer Family     Diverticulosis Family         colonic  Cancer Father      Meds/Allergies   Allergies   Allergen Reactions    Penicillins Hives       Meds:  Prior to Admission medications    Medication Sig Start Date End Date Taking? Authorizing Provider   ALPRAZolam Jessica Luis Daniel) 0 25 mg tablet alprazolam 0 25 mg tablet   Yes Historical Provider, MD   aspirin 81 MG tablet Take 81 mg by mouth daily     Yes Historical Provider, MD   betamethasone, augmented, (DIPROLENE) 0 05 % ointment Apply topically daily To psoriasis plaques till clear 2/12/20  Yes Claudette Shade, MD   calcipotriene (DOVONOX) 0 005 % ointment APPLY TOPICALLY TO PSORIASIS PLAQUES DAILY UNTIL CLEAR 3/16/20  Yes Claudette Shade, MD   enalapril (VASOTEC) 5 mg tablet Take 0 5 tablets (2 5 mg total) by mouth daily 1/24/20  Yes YONATAN Madden   fenofibrate (TRICOR) 145 mg tablet TAKE ONE TABLET BY MOUTH EVERY DAY 1/24/20  Yes YONATAN Madden   fluticasone [facility]) 50 mcg/act nasal spray 2 sprays into each nostril daily 2/11/20  Yes YONATAN Madden   metoprolol succinate (TOPROL-XL) 25 mg 24 hr tablet Take 1 tablet (25 mg total) by mouth daily 2/11/20  Yes YONATAN Madden   nitroglycerin (NITROSTAT) 0 4 mg SL tablet Place 0 4 mg under the tongue every 5 (five) minutes as needed for chest pain   Yes Historical Provider, MD   omeprazole (PriLOSEC) 20 mg delayed release capsule TAKE ONE CAPSULE BY MOUTH EVERY DAY 3/18/20  Yes YONATAN Madden   PARoxetine (PAXIL) 30 mg tablet TAKE 1 TABLET BY MOUTH ONCE DAILY 3/24/20  Yes YONATAN Madden   simvastatin (ZOCOR) 40 mg tablet TAKE ONE TABLET BY MOUTH EVERY DAY AT BEDTIME 1/24/20  Yes YONATAN Madden   triamcinolone (KENALOG) 0 1 % cream Apply topically as needed for irritation 2/11/20  Yes YONATAN Madden       Subjective:     Review of Systems:    General: negative for - chills, fatigue, fever,  weight gain or weight loss  Psychological: negative for - anxiety, behavioral disorder, concentration difficulties, decreased libido, depression, irritability, memory difficulties, mood swings, sleep disturbances or suicidal ideation  ENT: negative for - hearing difficulties , nasal congestion, nasal discharge, oral lesions, sinus pain, sneezing, sore throat  Allergy and Immunology: negative for - hives, insect bite sensitivity,  Hematological and Lymphatic: negative for - bleeding problems, blood clots,bruising, swollen lymph nodes  Endocrine: negative for - hair pattern changes, hot flashes, malaise/lethargy, mood swings, palpitations, polydipsia/polyuria, skin changes, temperature intolerance or unexpected weight change  Respiratory: negative for - cough, hemoptysis, orthopnea, shortness of breath, or wheezing  Cardiovascular: negative for - chest pain, dyspnea on exertion, edema,  Gastrointestinal: negative for - abdominal pain, nausea/vomiting  Genito-Urinary: negative for - dysuria, incontinence, irregular/heavy menses or urinary frequency/urgency  Musculoskeletal: negative for - gait disturbance, joint pain, joint stiffness, joint swelling, muscle pain, muscular weakness  Dermatological:  As in HPI  Neurological: negative for confusion, dizziness, headaches, impaired coordination/balance, memory loss, numbness/tingling, seizures, speech problems, tremors or weakness       Objective:   Temp 98 1 °F (36 7 °C) (Tympanic)     Physical Exam:    General Appearance:    Alert, cooperative, no distress   Head:    Normocephalic, without obvious abnormality, atraumatic           Skin:   A full skin exam was performed including scalp, head scalp, eyes, ears, nose, lips, neck, chest, axilla, abdomen, back, buttocks, bilateral upper extremities, bilateral lower extremities, hands, feet, fingers, toes, fingernails, and toenails macular erythema with her plaques previously marked improvement noted mainly involving close the 10% of body surface area but marked improvement noted     Assessment:     1   Psoriasis           Plan:   Marked improvement continue same therapy we will plan follow-up again in 6 months earlier if necessary patient also advised to get some sun exposure as this hopefully will help this    Larissa Villar MD  3/25/2020,2:23 PM    Portions of the record may have been created with voice recognition software   Occasional wrong word or "sound a like" substitutions may have occurred due to the inherent limitations of voice recognition software   Read the chart carefully and recognize, using context, where substitutions have occurred

## 2020-03-29 DIAGNOSIS — R05.9 COUGH: ICD-10-CM

## 2020-03-30 RX ORDER — FLUTICASONE PROPIONATE 50 MCG
SPRAY, SUSPENSION (ML) NASAL
Qty: 16 G | Refills: 0 | Status: SHIPPED | OUTPATIENT
Start: 2020-03-30 | End: 2020-05-18

## 2020-04-28 DIAGNOSIS — E78.49 OTHER HYPERLIPIDEMIA: ICD-10-CM

## 2020-04-30 ENCOUNTER — TELEPHONE (OUTPATIENT)
Dept: CARDIOLOGY CLINIC | Facility: CLINIC | Age: 70
End: 2020-04-30

## 2020-04-30 DIAGNOSIS — E78.49 OTHER HYPERLIPIDEMIA: ICD-10-CM

## 2020-04-30 RX ORDER — FENOFIBRATE 145 MG/1
145 TABLET, COATED ORAL DAILY
Qty: 90 TABLET | Refills: 3 | Status: SHIPPED | OUTPATIENT
Start: 2020-04-30 | End: 2021-07-20

## 2020-04-30 RX ORDER — FENOFIBRATE 145 MG/1
TABLET, COATED ORAL
Qty: 90 TABLET | Refills: 0 | Status: SHIPPED | OUTPATIENT
Start: 2020-04-30 | End: 2020-04-30 | Stop reason: SDUPTHER

## 2020-05-03 DIAGNOSIS — E78.5 HYPERLIPIDEMIA, UNSPECIFIED HYPERLIPIDEMIA TYPE: ICD-10-CM

## 2020-05-04 RX ORDER — SIMVASTATIN 40 MG
TABLET ORAL
Qty: 90 TABLET | Refills: 0 | Status: SHIPPED | OUTPATIENT
Start: 2020-05-04 | End: 2020-08-04 | Stop reason: SDUPTHER

## 2020-05-18 DIAGNOSIS — R05.9 COUGH: ICD-10-CM

## 2020-05-18 RX ORDER — FLUTICASONE PROPIONATE 50 MCG
SPRAY, SUSPENSION (ML) NASAL
Qty: 16 G | Refills: 0 | Status: SHIPPED | OUTPATIENT
Start: 2020-05-18 | End: 2020-06-25

## 2020-06-09 DIAGNOSIS — K21.9 GASTROESOPHAGEAL REFLUX DISEASE WITHOUT ESOPHAGITIS: ICD-10-CM

## 2020-06-09 RX ORDER — OMEPRAZOLE 20 MG/1
CAPSULE, DELAYED RELEASE ORAL
Qty: 30 CAPSULE | Refills: 2 | Status: SHIPPED | OUTPATIENT
Start: 2020-06-09 | End: 2020-09-16 | Stop reason: SDUPTHER

## 2020-06-21 DIAGNOSIS — I10 HYPERTENSION, UNSPECIFIED TYPE: ICD-10-CM

## 2020-06-22 RX ORDER — ENALAPRIL MALEATE 5 MG/1
TABLET ORAL
Qty: 30 TABLET | Refills: 2 | Status: SHIPPED | OUTPATIENT
Start: 2020-06-22 | End: 2020-12-22

## 2020-06-24 DIAGNOSIS — R05.9 COUGH: ICD-10-CM

## 2020-06-25 RX ORDER — FLUTICASONE PROPIONATE 50 MCG
SPRAY, SUSPENSION (ML) NASAL
Qty: 16 G | Refills: 0 | Status: SHIPPED | OUTPATIENT
Start: 2020-06-25 | End: 2020-07-28

## 2020-06-29 DIAGNOSIS — I10 ESSENTIAL HYPERTENSION: ICD-10-CM

## 2020-06-29 RX ORDER — METOPROLOL SUCCINATE 25 MG/1
25 TABLET, EXTENDED RELEASE ORAL DAILY
Qty: 90 TABLET | Refills: 1 | Status: SHIPPED | OUTPATIENT
Start: 2020-06-29 | End: 2020-12-22 | Stop reason: SDUPTHER

## 2020-07-28 DIAGNOSIS — R05.9 COUGH: ICD-10-CM

## 2020-07-28 RX ORDER — FLUTICASONE PROPIONATE 50 MCG
SPRAY, SUSPENSION (ML) NASAL
Qty: 16 G | Refills: 0 | Status: SHIPPED | OUTPATIENT
Start: 2020-07-28 | End: 2020-09-28

## 2020-08-04 ENCOUNTER — OFFICE VISIT (OUTPATIENT)
Dept: CARDIOLOGY CLINIC | Facility: CLINIC | Age: 70
End: 2020-08-04
Payer: MEDICARE

## 2020-08-04 VITALS
DIASTOLIC BLOOD PRESSURE: 72 MMHG | BODY MASS INDEX: 28.77 KG/M2 | OXYGEN SATURATION: 97 % | HEART RATE: 59 BPM | WEIGHT: 201 LBS | HEIGHT: 70 IN | SYSTOLIC BLOOD PRESSURE: 140 MMHG

## 2020-08-04 DIAGNOSIS — I51.89 DIASTOLIC DYSFUNCTION: ICD-10-CM

## 2020-08-04 DIAGNOSIS — Z98.61 CAD S/P PERCUTANEOUS CORONARY ANGIOPLASTY: ICD-10-CM

## 2020-08-04 DIAGNOSIS — Z95.1 HX OF CABG: ICD-10-CM

## 2020-08-04 DIAGNOSIS — E78.2 MIXED HYPERLIPIDEMIA: Primary | ICD-10-CM

## 2020-08-04 DIAGNOSIS — I25.10 CAD S/P PERCUTANEOUS CORONARY ANGIOPLASTY: ICD-10-CM

## 2020-08-04 DIAGNOSIS — I10 ESSENTIAL HYPERTENSION: ICD-10-CM

## 2020-08-04 PROCEDURE — 4040F PNEUMOC VAC/ADMIN/RCVD: CPT | Performed by: INTERNAL MEDICINE

## 2020-08-04 PROCEDURE — 1160F RVW MEDS BY RX/DR IN RCRD: CPT | Performed by: INTERNAL MEDICINE

## 2020-08-04 PROCEDURE — 3008F BODY MASS INDEX DOCD: CPT | Performed by: INTERNAL MEDICINE

## 2020-08-04 PROCEDURE — 3077F SYST BP >= 140 MM HG: CPT | Performed by: INTERNAL MEDICINE

## 2020-08-04 PROCEDURE — 99214 OFFICE O/P EST MOD 30 MIN: CPT | Performed by: INTERNAL MEDICINE

## 2020-08-04 PROCEDURE — 1036F TOBACCO NON-USER: CPT | Performed by: INTERNAL MEDICINE

## 2020-08-04 PROCEDURE — 3078F DIAST BP <80 MM HG: CPT | Performed by: INTERNAL MEDICINE

## 2020-08-04 RX ORDER — LEVOCETIRIZINE DIHYDROCHLORIDE 5 MG/1
5 TABLET, FILM COATED ORAL DAILY
COMMUNITY
Start: 2020-07-22

## 2020-08-04 RX ORDER — SIMVASTATIN 40 MG
40 TABLET ORAL
Qty: 90 TABLET | Refills: 3 | Status: SHIPPED | OUTPATIENT
Start: 2020-08-04 | End: 2021-07-20

## 2020-08-04 NOTE — PROGRESS NOTES
CARDIOLOGY OFFICE VISIT  [facility]  [address], [place], [address] [facility], [facility] Y[place] Rubio  Tel: [phone]      NAME: Tres Martinez  AGE: 65 y o  SEX: male  : 1950   MRN: [id number]      Chief Complaint:  Chief Complaint   Patient presents with    Follow-up         History of Present Illness:   Patient comes for follow up  States he is doing well from cardiac stand point and denies chest pain / pressure, SOB, palpitations, lightheadedness, syncope, swelling feet, orthopnea, PND, claudication  CAD s/p PCI in , s/p CABG x2 in  at [facility] -  States is doing well cardiac wise with no cardiac symptoms  Taking all medications regularly  Has not used SL NTG since the last clinic visit  HTN, diastolic dysfunction -  Has been hypertensive for many years  Taking medications regularly  Denies lightheadedness, headache, medication side effects  HLP -  Has had hyperlipidemia for many years  Taking statin regularly along with diet control  Denies myalgia  PCP closely monitoring the blood work  Past Medical History:  Past Medical History:   Diagnosis Date    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[facility] [address]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    Allergic rhinitis     Anemia     Anxiety     Atherosclerosis     CAD (coronary artery disease)     Colitis     Depression     Diabetes mellitus (HCC)     Difficulty breathing     Gastritis     GERD (gastroesophageal reflux disease)     Hepatic cyst     Hyperlipidemia     Hypertension     IFG (impaired fasting glucose)     Myocardial infarction (HCC)     9 YEARS AGO    Psoriasis          Past Surgical History:  Past Surgical History:   Procedure Laterality Date    ANGIOPLASTY      COLON SURGERY      CORONARY ARTERY BYPASS GRAFT      PA COLONOSCOPY FLX DX W/COLLJ SPEC WHEN PFRMD N/A 2017    Procedure: COLONOSCOPY;  Surgeon: Melina Mcconnell MD;  Location: [facility];   Service: Gastroenterology    PA NAOMI ROMERO CTR VAD W/SUBQ PORT/ CTR/PRPH INSJ Left 5/13/2016    Procedure: REMOVAL VENOUS PORT (PORT-A-CATH);   Surgeon: Thao Arechiga MD;  Location: [facility];  Service: Colorectal         Family History:  Family History   Problem Relation Age of Onset    Colon cancer Family     Diverticulosis Family         colonic    Cancer Father          Social History:  Social History     Socioeconomic History    Marital status: /Civil Union     Spouse name: None    Number of children: None    Years of education: None    Highest education level: None   Occupational History    None   Social Needs    Financial resource strain: None    Food insecurity     Worry: None     Inability: None    Transportation needs     Medical: None     Non-medical: None   Tobacco Use    Smoking status: Never Smoker    Smokeless tobacco: Never Used   Substance and Sexual Activity    Alcohol use: Yes     Frequency: Monthly or less     Drinks per session: 1 or 2     Binge frequency: Never     Comment: social    Drug use: No    Sexual activity: Not Currently   Lifestyle    Physical activity     Days per week: 2 days     Minutes per session: 30 min    Stress: Not at all   Relationships    Social connections     Talks on phone: None     Gets together: None     Attends Judaism service: None     Active member of club or organization: None     Attends meetings of clubs or organizations: None     Relationship status: None    Intimate partner violence     Fear of current or ex partner: None     Emotionally abused: None     Physically abused: None     Forced sexual activity: None   Other Topics Concern    None   Social History Narrative    No advance directives         Active Problems:  Patient Active Problem List   Diagnosis    Colon cancer ([facility] )    CAD S/P percutaneous coronary angioplasty    Gastritis    Hypertension    Hyperlipidemia    Hx of CABG    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 ([facility] )    Major depressive disorder with single episode, in full remission ([facility] )    Type 2 diabetes mellitus without complication, without long-term current use of insulin ([facility] )         The following portions of the patient's history were reviewed and updated as appropriate: past medical history, past surgical history, past family history,  past social history, current medications, allergies and problem list       Review of Systems:  Constitutional: Denies fever, chills, fatigue  Eyes: Denies eye redness, eye discharge, double vision  ENT: Denies hearing loss, tinnitus, sneezing, nasal discharge, sore throat   Respiratory: Denies cough, expectoration, hemoptysis, shortness of breath  Cardiovascular: Denies chest pain, palpitations, orthopnea, PND, lower extremity swelling  Gastrointestinal: Denies abdominal pain, nausea, vomiting, hematemesis, diarrhea, bloody stools  Genito-Urinary: Denies dysuria, incontinence  Musculoskeletal: Denies back pain, joint pain, muscle pain  Neurologic: Denies confusion, lightheadedness, syncope, headache, focal weakness, sensory changes, seizures  Endocrine: Denies polyuria, polydipsia, temperature intolerance  Allergy and Immunology: Denies hives, insect bite sensitivity  Hematological and Lymphatic: Denies bleeding problems, swollen glands   Psychological: Denies depression, suicidal ideation, anxiety, panic  Dermatological: Denies pruritus, rash, skin lesion changes      Vitals:  Vitals:    08/04/20 0944   BP: 140/72   Pulse: 59   SpO2: 97%       Body mass index is 28 84 kg/m²  Weight (last 2 days)     Date/Time   Weight    08/04/20 0944   91 2 (201)                Physical Examination:  General: Patient is not in acute distress  Awake, alert, oriented in time, place and person  Responding to commands  Head: Normocephalic  Atraumatic  Eyes: Both pupils normal sized, round and reactive to light  Nonicteric  ENT: Normal external ear canals  Nares normal, no drainage   Lips and oral mucosa normal  Neck: Supple  JVP not raised  Trachea central  No thyromegaly  Lungs: Bilateral bronchovascular breath sounds with no crackles or rhonchi  Chest wall: No tenderness  Cardiovascular: RRR  S1 and S2 normal  No murmur, rub or gallop  Gastrointestinal: Abdomen soft, nontender  No guarding or rigidity  Liver and spleen not palpable  Bowel sounds present  Neurologic: Patient is awake, alert, oriented in time, place and person  Responding to command  Moving all extremities  Integumentary:  No skin rash  Lymphatic: No cervical lymphadenopathy  Back: Symmetric   No CVA tenderness  Extremities: No clubbing, cyanosis or edema      Laboratory Results:  CBC with diff:   Lab Results   Component Value Date    WBC 5 88 09/03/2019    WBC 6 1 03/16/2015    RBC 5 11 09/03/2019    RBC 4 91 03/16/2015    HGB 14 4 09/03/2019    HGB 14 7 03/16/2015    HCT 44 3 09/03/2019    HCT 42 0 03/16/2015    MCV 87 09/03/2019    MCV 85 03/16/2015    MCH 28 2 09/03/2019    MCH 29 9 03/16/2015    RDW 13 1 09/03/2019    RDW 12 0 03/16/2015     09/03/2019     03/16/2015       CMP:  Lab Results   Component Value Date    CREATININE 1 30 09/03/2019    CREATININE 1 1 03/16/2015    BUN 13 09/03/2019    BUN 12 03/16/2015     (H) 03/16/2015    K 4 1 09/03/2019    K 4 0 03/16/2015     09/03/2019     (H) 03/16/2015    CO2 28 09/03/2019    CO2 29 4 03/16/2015    GLUCOSE 104 03/16/2015    PROT 6 8 03/16/2015    ALKPHOS 44 (L) 09/03/2019    ALKPHOS 89 03/16/2015    ALT 37 09/03/2019    ALT 43 03/16/2015    AST 29 09/03/2019    AST 23 03/16/2015    BILIDIR 0 19 09/07/2018       Lab Results   Component Value Date    HGBA1C 5 5 09/03/2019    HGBA1C 5 1 03/16/2015    MG 2 0 04/01/2014    PHOS 2 4 (L) 04/01/2014       Lipid Profile:   Lab Results   Component Value Date    CHOL 129 03/16/2015    CHOL 148 02/12/2014     Lab Results   Component Value Date    HDL 36 (L) 09/03/2019    HDL 36 (L) 02/23/2019    HDL 35 (L) 09/07/2018 Lab Results   Component Value Date    LDLCALC 81 2019    LDLCALC 72 2019    LDLCALC 74 2018     Lab Results   Component Value Date    TRIG 117 2019    TRIG 111 2019    TRIG 162 (H) 2018       Cardiac testing:   Results for orders placed during the hospital encounter of 20   Echo complete with contrast if indicated    Narrative [facility] [address] [facility]  [phone]    Transthoracic Echocardiogram  2D, M-mode, and Color Doppler    Study date:  2020    Patient: Tato Amador  MR number: [id number]  Account number: [de-identified]  : 1950  Age: 65 years  Gender: Male  Status: Outpatient  Location: [facility]  Height: 70 in  Weight: 198 7 lb  BP: 134/ 80 mmHg    Indications: CAD  Diagnoses: I25 10 - Atherosclerotic heart disease of native coronary artery without angina pectoris    Sonographer:  Garnett RCS  Primary Physician:  Pierre Cano MD  Referring Physician:  Cas Mcfarland MD  Group:  [facility]  Interpreting Physician:  Cas Mcfaralnd MD    SUMMARY    LEFT VENTRICLE:  Systolic function was normal  Ejection fraction was estimated to be 60 %  There were no regional wall motion abnormalities  Concentric hypertrophy was present  Features were consistent with a pseudonormal left ventricular filling pattern, with concomitant abnormal relaxation and increased filling pressure (grade 2 diastolic dysfunction)  RIGHT VENTRICLE:  The size was normal   Systolic function was normal     LEFT ATRIUM:  The atrium was mildly dilated  MITRAL VALVE:  There was mild regurgitation  TRICUSPID VALVE:  There was mild regurgitation  Pulmonary artery systolic pressure was at the upper limits of normal     PULMONIC VALVE:  There was trace regurgitation  HISTORY: PRIOR HISTORY: [place]  s/p angioplasty CABG   Risk factors: hypertension, diabetes, and medication-treated hypercholesterolemia  PROCEDURE: The study was performed in the [address] [place] [address]  This was a routine study  The transthoracic approach was used  The study included complete 2D imaging, M-mode, and color Doppler  The heart rate was 66 bpm, at the  start of the study  Images were obtained from the parasternal, apical, subcostal, and suprasternal notch acoustic windows  Image quality was adequate  LEFT VENTRICLE: Size was normal  Systolic function was normal  Ejection fraction was estimated to be 60 %  There were no regional wall motion abnormalities  Concentric hypertrophy was present  No evidence of apical thrombus  DOPPLER:  Features were consistent with a pseudonormal left ventricular filling pattern, with concomitant abnormal relaxation and increased filling pressure (grade 2 diastolic dysfunction)  RIGHT VENTRICLE: The size was normal  Systolic function was normal  Wall thickness was normal     LEFT ATRIUM: The atrium was mildly dilated  RIGHT ATRIUM: Size was normal     MITRAL VALVE: Valve structure was normal  There was normal leaflet separation  DOPPLER: The transmitral velocity was within the normal range  There was no evidence for stenosis  There was mild regurgitation  AORTIC VALVE: The valve was trileaflet  Leaflets exhibited normal thickness and normal cuspal separation  DOPPLER: Transaortic velocity was within the normal range  There was no evidence for stenosis  There was no significant  regurgitation  TRICUSPID VALVE: The valve structure was normal  There was normal leaflet separation  DOPPLER: The transtricuspid velocity was within the normal range  There was no evidence for stenosis  There was mild regurgitation  Pulmonary artery  systolic pressure was at the upper limits of normal     PULMONIC VALVE: Leaflets exhibited normal thickness, no calcification, and normal cuspal separation  DOPPLER: The transpulmonic velocity was within the normal range   There was trace regurgitation  PERICARDIUM: There was no pericardial effusion  The pericardium was normal in appearance  AORTA: The root exhibited normal size  SYSTEMIC VEINS: IVC: The inferior vena cava was normal in size  SYSTEM MEASUREMENT TABLES    2D  %FS: 33 3 %  Ao Diam: 3 38 cm  EDV(Teich): 115 31 ml  EF(Teich): 61 75 %  ESV(Teich): 44 1 ml  IVSd: 1 2 cm  LA Area: 21 27 cm2  LA Diam: 4 38 cm  LVEDV MOD A4C: 136 28 ml  LVEF MOD A4C: 42 6 %  LVESV MOD A4C: 78 22 ml  LVIDd: 4 95 cm  LVIDs: 3 3 cm  LVLd A4C: 8 76 cm  LVLs A4C: 7 38 cm  LVPWd: 1 53 cm  RA Area: 16 14 cm2  RVIDd: 4 63 cm  RWT: 0 62  SV MOD A4C: 58 05 ml  SV(Teich): 71 21 ml    CW  AV Env  Ti: 277 83 ms  AV MaxP 16 mmHg  AV VTI: 23 21 cm  AV Vmax: 1 24 m/s  AV Vmean: 0 84 m/s  AV meanPG: 3 2 mmHg  TR MaxP 99 mmHg  TR Vmax: 2 87 m/s    MM  TAPSE: 1 84 cm    PW  E': 0 08 m/s  E/E': 11 27  LVOT Env  Ti: 296 86 ms  LVOT VTI: 25 26 cm  LVOT Vmax: 1 24 m/s  LVOT Vmean: 0 85 m/s  LVOT maxP 15 mmHg  LVOT meanPG: 3 37 mmHg  MV A Kevin: 0 79 m/s  MV Dec Rush: 3 08 m/s2  MV DecT: 295 52 ms  MV E Kevin: 0 91 m/s  MV E/A Ratio: 1 15  MV PHT: 85 7 ms  MVA By PHT: 2 57 cm2    [facility] Accredited Echocardiography Laboratory    Prepared and electronically signed by    Isidoro Rodriguez MD  Signed 2020 16:13:18         Medications:    Current Outpatient Medications:     aspirin 81 MG tablet, Take 81 mg by mouth daily  , Disp: , Rfl:     betamethasone, augmented, (DIPROLENE) 0 05 % ointment, Apply topically daily To psoriasis plaques till clear, Disp: 50 g, Rfl: 2    calcipotriene (DOVONOX) 0 005 % ointment, Apply topically 2 (two) times a day, Disp: 60 g, Rfl: 3    enalapril (VASOTEC) 5 mg tablet, TAKE 1/2 TABLET (2 5MG TOTAL) BY MOUTH DAILY, Disp: 30 tablet, Rfl: 2    fenofibrate (TRICOR) 145 mg tablet, Take 1 tablet (145 mg total) by mouth daily, Disp: 90 tablet, Rfl: 3    fluticasone (FLONASE) 50 mcg/act nasal spray, USE 2 SPRAYS INTO EACH NOSTRIL DAILY, Disp: 16 g, Rfl: 0    metoprolol succinate (TOPROL-XL) 25 mg 24 hr tablet, Take 1 tablet (25 mg total) by mouth daily, Disp: 90 tablet, Rfl: 1    nitroglycerin (NITROSTAT) 0 4 mg SL tablet, Place 0 4 mg under the tongue every 5 (five) minutes as needed for chest pain, Disp: , Rfl:     omeprazole (PriLOSEC) 20 mg delayed release capsule, TAKE ONE CAPSULE BY MOUTH EVERY DAY, Disp: 30 capsule, Rfl: 2    PARoxetine (PAXIL) 30 mg tablet, TAKE 1 TABLET BY MOUTH ONCE DAILY, Disp: 30 tablet, Rfl: 5    simvastatin (ZOCOR) 40 mg tablet, Take 1 tablet (40 mg total) by mouth daily at bedtime, Disp: 90 tablet, Rfl: 3    triamcinolone (KENALOG) 0 1 % cream, Apply topically as needed for irritation, Disp: 15 g, Rfl: 0    ALPRAZolam (XANAX) 0 25 mg tablet, alprazolam 0 25 mg tablet, Disp: , Rfl:     levocetirizine (XYZAL) 5 MG tablet, , Disp: , Rfl:       Allergies: Allergies   Allergen Reactions    Penicillins Hives         Assessment and Plan:  1  CAD S/P percutaneous coronary angioplasty s/p CABG  Doing well  Continue current medications    2  Essential hypertension, diastolic dysfunction  BP stable  Continue current medications    3  Mixed hyperlipidemia  Continue statin and diet control  His PCP closely monitor the blood work    Recommend aggressive risk factor modification and therapeutic lifestyle changes  Low-salt, low-calorie, low-fat, low-cholesterol diet with regular exercise and to optimize weight  I will defer the ordering and monitoring of necessity lab studies to you, but I am available and happy to review and manage any of the data at your request in the future  Discussed concepts of atherosclerosis, including signs and symptoms of cardiac disease  Previous studies were reviewed  Safety measures were reviewed  Questions were entertained and answered  Patient was advised to report any problems requiring medical attention      Follow-up with PCP and appropriate specialist and lab work as discussed  Return for follow up visit as scheduled or earlier, if needed  Thank you for allowing me to participate in the care and evaluation of your patient  Should you have any questions, please feel free to contact me        Cas Mcfarland MD  8/4/2020,10:14 AM

## 2020-09-16 DIAGNOSIS — F32.A DEPRESSION, UNSPECIFIED DEPRESSION TYPE: ICD-10-CM

## 2020-09-16 DIAGNOSIS — K21.9 GASTROESOPHAGEAL REFLUX DISEASE WITHOUT ESOPHAGITIS: ICD-10-CM

## 2020-09-16 RX ORDER — PAROXETINE 30 MG/1
TABLET, FILM COATED ORAL
Qty: 30 TABLET | Refills: 5 | Status: SHIPPED | OUTPATIENT
Start: 2020-09-16 | End: 2021-03-08

## 2020-09-16 RX ORDER — OMEPRAZOLE 20 MG/1
CAPSULE, DELAYED RELEASE ORAL
Qty: 30 CAPSULE | Refills: 2 | Status: SHIPPED | OUTPATIENT
Start: 2020-09-16 | End: 2021-11-15

## 2020-09-27 DIAGNOSIS — R05.9 COUGH: ICD-10-CM

## 2020-09-28 RX ORDER — FLUTICASONE PROPIONATE 50 MCG
SPRAY, SUSPENSION (ML) NASAL
Qty: 16 G | Refills: 0 | Status: SHIPPED | OUTPATIENT
Start: 2020-09-28 | End: 2020-12-17

## 2020-10-06 ENCOUNTER — OFFICE VISIT (OUTPATIENT)
Dept: DERMATOLOGY | Facility: CLINIC | Age: 70
End: 2020-10-06
Payer: MEDICARE

## 2020-10-06 VITALS — TEMPERATURE: 97.8 F

## 2020-10-06 DIAGNOSIS — L40.9 PSORIASIS: Primary | ICD-10-CM

## 2020-10-06 DIAGNOSIS — Z13.89 SCREENING FOR SKIN CONDITION: ICD-10-CM

## 2020-10-06 PROCEDURE — 99213 OFFICE O/P EST LOW 20 MIN: CPT | Performed by: DERMATOLOGY

## 2020-11-17 ENCOUNTER — TELEMEDICINE (OUTPATIENT)
Dept: INTERNAL MEDICINE CLINIC | Facility: CLINIC | Age: 70
End: 2020-11-17
Payer: MEDICARE
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
## 2020-11-17 PROCEDURE — 99213 OFFICE O/P EST LOW 20 MIN: CPT | Performed by: INTERNAL MEDICINE

## 2020-11-17 RX ORDER — DOXYCYCLINE HYCLATE 100 MG/1
200 CAPSULE ORAL ONCE
Qty: 2 CAPSULE | Refills: 0 | Status: SHIPPED | OUTPATIENT
Start: 2020-11-17 | End: 2020-11-17

## 2020-12-16 DIAGNOSIS — R05.9 COUGH: ICD-10-CM

## 2020-12-17 RX ORDER — FLUTICASONE PROPIONATE 50 MCG
SPRAY, SUSPENSION (ML) NASAL
Qty: 1 BOTTLE | Refills: 0 | Status: SHIPPED | OUTPATIENT
Start: 2020-12-17 | End: 2021-06-22

## 2020-12-22 DIAGNOSIS — I10 HYPERTENSION, UNSPECIFIED TYPE: ICD-10-CM

## 2020-12-22 DIAGNOSIS — I10 ESSENTIAL HYPERTENSION: ICD-10-CM

## 2020-12-22 RX ORDER — METOPROLOL SUCCINATE 25 MG/1
25 TABLET, EXTENDED RELEASE ORAL DAILY
Qty: 90 TABLET | Refills: 2 | Status: SHIPPED | OUTPATIENT
Start: 2020-12-22 | End: 2021-09-16 | Stop reason: SDUPTHER

## 2020-12-22 RX ORDER — ENALAPRIL MALEATE 5 MG/1
TABLET ORAL
Qty: 30 TABLET | Refills: 2 | Status: SHIPPED | OUTPATIENT
Start: 2020-12-22 | End: 2020-12-29

## 2020-12-29 DIAGNOSIS — I10 HYPERTENSION, UNSPECIFIED TYPE: ICD-10-CM

## 2020-12-29 RX ORDER — ENALAPRIL MALEATE 5 MG/1
TABLET ORAL
Qty: 30 TABLET | Refills: 2 | Status: SHIPPED | OUTPATIENT
Start: 2020-12-29 | End: 2021-12-25

## 2021-01-14 DIAGNOSIS — L40.9 PSORIASIS: ICD-10-CM

## 2021-01-14 RX ORDER — CALCIPOTRIENE 50 UG/G
OINTMENT TOPICAL 2 TIMES DAILY
Qty: 60 G | Refills: 3 | Status: SHIPPED | OUTPATIENT
Start: 2021-01-14 | End: 2022-03-17 | Stop reason: SDUPTHER

## 2021-02-25 ENCOUNTER — TELEPHONE (OUTPATIENT)
Dept: INTERNAL MEDICINE CLINIC | Facility: CLINIC | Age: 71
End: 2021-02-25

## 2021-02-25 NOTE — TELEPHONE ENCOUNTER
Pt has appt 3/2 lesly/Zoila and is asking her to put lab orders in his chart    call pt when in so he can come & get them done

## 2021-02-26 DIAGNOSIS — E11.9 TYPE 2 DIABETES MELLITUS WITHOUT COMPLICATION, WITHOUT LONG-TERM CURRENT USE OF INSULIN (HCC): ICD-10-CM

## 2021-02-26 DIAGNOSIS — Z12.5 SCREENING FOR PROSTATE CANCER: ICD-10-CM

## 2021-02-26 DIAGNOSIS — E78.5 HYPERLIPIDEMIA, UNSPECIFIED HYPERLIPIDEMIA TYPE: ICD-10-CM

## 2021-02-26 DIAGNOSIS — I10 HYPERTENSION, UNSPECIFIED TYPE: Primary | ICD-10-CM

## 2021-02-27 ENCOUNTER — LAB (OUTPATIENT)
Dept: LAB | Facility: CLINIC | Age: 71
End: 2021-02-27
Payer: MEDICARE

## 2021-02-27 DIAGNOSIS — E78.5 HYPERLIPIDEMIA, UNSPECIFIED HYPERLIPIDEMIA TYPE: ICD-10-CM

## 2021-02-27 DIAGNOSIS — I10 HYPERTENSION, UNSPECIFIED TYPE: ICD-10-CM

## 2021-02-27 DIAGNOSIS — Z12.5 SCREENING FOR PROSTATE CANCER: ICD-10-CM

## 2021-02-27 DIAGNOSIS — E11.9 TYPE 2 DIABETES MELLITUS WITHOUT COMPLICATION, WITHOUT LONG-TERM CURRENT USE OF INSULIN (HCC): ICD-10-CM

## 2021-02-27 LAB
ALBUMIN SERPL BCP-MCNC: 3.8 G/DL (ref 3.5–5)
ALP SERPL-CCNC: 43 U/L (ref 46–116)
ALT SERPL W P-5'-P-CCNC: 51 U/L (ref 12–78)
ANION GAP SERPL CALCULATED.3IONS-SCNC: 4 MMOL/L (ref 4–13)
AST SERPL W P-5'-P-CCNC: 33 U/L (ref 5–45)
BASOPHILS # BLD AUTO: 0.03 THOUSANDS/ΜL (ref 0–0.1)
BASOPHILS NFR BLD AUTO: 1 % (ref 0–1)
BILIRUB SERPL-MCNC: 0.74 MG/DL (ref 0.2–1)
BUN SERPL-MCNC: 16 MG/DL (ref 5–25)
CALCIUM SERPL-MCNC: 9.2 MG/DL (ref 8.3–10.1)
CHLORIDE SERPL-SCNC: 109 MMOL/L (ref 100–108)
CHOLEST SERPL-MCNC: 139 MG/DL (ref 50–200)
CO2 SERPL-SCNC: 31 MMOL/L (ref 21–32)
CREAT SERPL-MCNC: 1.09 MG/DL (ref 0.6–1.3)
CREAT UR-MCNC: 211 MG/DL
EOSINOPHIL # BLD AUTO: 0.28 THOUSAND/ΜL (ref 0–0.61)
EOSINOPHIL NFR BLD AUTO: 5 % (ref 0–6)
ERYTHROCYTE [DISTWIDTH] IN BLOOD BY AUTOMATED COUNT: 13.1 % (ref 11.6–15.1)
EST. AVERAGE GLUCOSE BLD GHB EST-MCNC: 108 MG/DL
GFR SERPL CREATININE-BSD FRML MDRD: 67 ML/MIN/1.73SQ M
GLUCOSE P FAST SERPL-MCNC: 103 MG/DL (ref 65–99)
HBA1C MFR BLD: 5.4 %
HCT VFR BLD AUTO: 45.4 % (ref 36.5–49.3)
HDLC SERPL-MCNC: 34 MG/DL
HGB BLD-MCNC: 15 G/DL (ref 12–17)
IMM GRANULOCYTES # BLD AUTO: 0.02 THOUSAND/UL (ref 0–0.2)
IMM GRANULOCYTES NFR BLD AUTO: 0 % (ref 0–2)
LDLC SERPL CALC-MCNC: 73 MG/DL (ref 0–100)
LYMPHOCYTES # BLD AUTO: 1.51 THOUSANDS/ΜL (ref 0.6–4.47)
LYMPHOCYTES NFR BLD AUTO: 28 % (ref 14–44)
MCH RBC QN AUTO: 28.8 PG (ref 26.8–34.3)
MCHC RBC AUTO-ENTMCNC: 33 G/DL (ref 31.4–37.4)
MCV RBC AUTO: 87 FL (ref 82–98)
MICROALBUMIN UR-MCNC: 12.3 MG/L (ref 0–20)
MICROALBUMIN/CREAT 24H UR: 6 MG/G CREATININE (ref 0–30)
MONOCYTES # BLD AUTO: 0.6 THOUSAND/ΜL (ref 0.17–1.22)
MONOCYTES NFR BLD AUTO: 11 % (ref 4–12)
NEUTROPHILS # BLD AUTO: 3.04 THOUSANDS/ΜL (ref 1.85–7.62)
NEUTS SEG NFR BLD AUTO: 55 % (ref 43–75)
NONHDLC SERPL-MCNC: 105 MG/DL
NRBC BLD AUTO-RTO: 0 /100 WBCS
PLATELET # BLD AUTO: 216 THOUSANDS/UL (ref 149–390)
PMV BLD AUTO: 10.6 FL (ref 8.9–12.7)
POTASSIUM SERPL-SCNC: 4 MMOL/L (ref 3.5–5.3)
PROT SERPL-MCNC: 6.8 G/DL (ref 6.4–8.2)
PSA SERPL-MCNC: 2.2 NG/ML (ref 0–4)
RBC # BLD AUTO: 5.21 MILLION/UL (ref 3.88–5.62)
SODIUM SERPL-SCNC: 144 MMOL/L (ref 136–145)
TRIGL SERPL-MCNC: 161 MG/DL
TSH SERPL DL<=0.05 MIU/L-ACNC: 2.02 UIU/ML (ref 0.36–3.74)
WBC # BLD AUTO: 5.48 THOUSAND/UL (ref 4.31–10.16)

## 2021-02-27 PROCEDURE — 85025 COMPLETE CBC W/AUTO DIFF WBC: CPT

## 2021-02-27 PROCEDURE — 80061 LIPID PANEL: CPT

## 2021-02-27 PROCEDURE — 82043 UR ALBUMIN QUANTITATIVE: CPT | Performed by: NURSE PRACTITIONER

## 2021-02-27 PROCEDURE — 83036 HEMOGLOBIN GLYCOSYLATED A1C: CPT

## 2021-02-27 PROCEDURE — 84443 ASSAY THYROID STIM HORMONE: CPT

## 2021-02-27 PROCEDURE — G0103 PSA SCREENING: HCPCS

## 2021-02-27 PROCEDURE — 82570 ASSAY OF URINE CREATININE: CPT | Performed by: NURSE PRACTITIONER

## 2021-02-27 PROCEDURE — 80053 COMPREHEN METABOLIC PANEL: CPT

## 2021-02-27 PROCEDURE — 36415 COLL VENOUS BLD VENIPUNCTURE: CPT

## 2021-03-02 ENCOUNTER — OFFICE VISIT (OUTPATIENT)
Dept: INTERNAL MEDICINE CLINIC | Facility: CLINIC | Age: 71
End: 2021-03-02
Payer: MEDICARE

## 2021-03-02 VITALS
SYSTOLIC BLOOD PRESSURE: 120 MMHG | DIASTOLIC BLOOD PRESSURE: 84 MMHG | BODY MASS INDEX: 29.06 KG/M2 | WEIGHT: 203 LBS | TEMPERATURE: 97.4 F | RESPIRATION RATE: 16 BRPM | HEART RATE: 76 BPM | HEIGHT: 70 IN

## 2021-03-02 DIAGNOSIS — E78.5 HYPERLIPIDEMIA, UNSPECIFIED HYPERLIPIDEMIA TYPE: ICD-10-CM

## 2021-03-02 DIAGNOSIS — C18.9 MALIGNANT NEOPLASM OF COLON, UNSPECIFIED PART OF COLON (HCC): ICD-10-CM

## 2021-03-02 DIAGNOSIS — E11.9 TYPE 2 DIABETES MELLITUS WITHOUT COMPLICATION, WITHOUT LONG-TERM CURRENT USE OF INSULIN (HCC): ICD-10-CM

## 2021-03-02 DIAGNOSIS — H35.00 RETINOPATHY: ICD-10-CM

## 2021-03-02 DIAGNOSIS — I10 HYPERTENSION, UNSPECIFIED TYPE: Primary | ICD-10-CM

## 2021-03-02 PROCEDURE — 99214 OFFICE O/P EST MOD 30 MIN: CPT | Performed by: NURSE PRACTITIONER

## 2021-03-02 PROCEDURE — G0439 PPPS, SUBSEQ VISIT: HCPCS | Performed by: NURSE PRACTITIONER

## 2021-03-02 PROCEDURE — 1123F ACP DISCUSS/DSCN MKR DOCD: CPT | Performed by: NURSE PRACTITIONER

## 2021-03-02 NOTE — PROGRESS NOTES
Assessment/Plan:    Patient Instructions   Diabetes Mellitus- HGA1C at goal  Continue limiting sweets  Referral provided to patient for podiatry for DM foot care and management  Referral provided to patient for opthalmology for follow up with DM retinopathy  Hypertension- Continue on current medications, limit salt, fats  Hyperlipidemia- Elevated triglycerides and low HDL  Limit snacks and sweets  Increase activity to 30 minutes daily  Continue statin    Depression/anxiety- Managed well with current treatments  Malignant neoplasm of Colon- Colonoscopy due, encouraged patient to call to schedule as soon as he is able  Health Maintenance-  Discussed Pneumovax, Shingrix (obtain at pharmacy), and COVID 19 vaccine  Patient will consider these vaccines and reach out if he is interested  Patient provided with number to schedule COVID vaccine  Vivid dreams with period limb movement ? Related to paxil- already takes that in am  Could consider sleep study w/ rare history of PND  He will consider    CAD stable without angina continue to modify risk factors                 Diagnoses and all orders for this visit:    Hypertension, unspecified type  -     CBC and differential; Future  -     Comprehensive metabolic panel; Future    Type 2 diabetes mellitus without complication, without long-term current use of insulin (HCC)  -     Hemoglobin A1C; Future  -     Ambulatory referral to Podiatry; Future    Hyperlipidemia, unspecified hyperlipidemia type  -     Lipid panel; Future  -     TSH, 3rd generation with Free T4 reflex; Future    Malignant neoplasm of colon, unspecified part of colon [facility])  -     Ambulatory referral to Gastroenterology; Future  -     CEA; Future    Retinopathy  -     Ambulatory referral to Ophthalmology;  Future    Other orders  -     Cancel: IRIS Diabetic eye exam         Subjective:      Patient ID: Nathalie Gilford is a 66 y o  male    Patient is a 66year old male presenting today for his wellness visit  He is pleasant and cooperative  His only complaint today is he is having vivid dreams with random leg movements  These movements do not wake him up from his sleep but his wife tells him that they happen often  He denies any recent gasping or waking up with shortness of breath but has had it in the past  He reports taking the paroxetine in the morning and denies this medication is causing him to be more tired that normal    No home bps  Admits to eating sweets  Active no formal exercise  Taking chol meds        Current Outpatient Medications:     aspirin 81 MG tablet, Take 81 mg by mouth daily  , Disp: , Rfl:     betamethasone, augmented, (DIPROLENE) 0 05 % ointment, Apply topically daily To psoriasis plaques till clear, Disp: 50 g, Rfl: 2    calcipotriene (DOVONOX) 0 005 % ointment, Apply topically 2 (two) times a day, Disp: 60 g, Rfl: 3    enalapril (VASOTEC) 5 mg tablet, TAKE 1/2 TABLET (2 5MG TOTAL) BY MOUTH DAILY, Disp: 30 tablet, Rfl: 2    fenofibrate (TRICOR) 145 mg tablet, Take 1 tablet (145 mg total) by mouth daily, Disp: 90 tablet, Rfl: 3    fluticasone (FLONASE) 50 mcg/act nasal spray, USE 2 SPRAYS INTO EACH NOSTRIL DAILY, Disp: 1 Bottle, Rfl: 0    levocetirizine (XYZAL) 5 MG tablet, Take 5 mg by mouth daily , Disp: , Rfl:     metoprolol succinate (TOPROL-XL) 25 mg 24 hr tablet, Take 1 tablet (25 mg total) by mouth daily, Disp: 90 tablet, Rfl: 2    nitroglycerin (NITROSTAT) 0 4 mg SL tablet, Place 0 4 mg under the tongue every 5 (five) minutes as needed for chest pain, Disp: , Rfl:     omeprazole (PriLOSEC) 20 mg delayed release capsule, TAKE ONE CAPSULE BY MOUTH EVERY DAY, Disp: 30 capsule, Rfl: 2    PARoxetine (PAXIL) 30 mg tablet, TAKE ONE TABLET BY MOUTH EVERY DAY, Disp: 30 tablet, Rfl: 5    simvastatin (ZOCOR) 40 mg tablet, Take 1 tablet (40 mg total) by mouth daily at bedtime, Disp: 90 tablet, Rfl: 3    triamcinolone (KENALOG) 0 1 % cream, Apply topically as needed for irritation, Disp: 15 g, Rfl: 0    Recent Results (from the past 1008 hour(s))   Microalbumin / creatinine urine ratio    Collection Time: 02/27/21  9:06 AM   Result Value Ref Range    Creatinine, Ur 211 0 mg/dL    Microalbum  ,U,Random 12 3 0 0 - 20 0 mg/L    Microalb Creat Ratio 6 0 - 30 mg/g creatinine   CBC and differential    Collection Time: 02/27/21  9:06 AM   Result Value Ref Range    WBC 5 48 4 31 - 10 16 Thousand/uL    RBC 5 21 3 88 - 5 62 Million/uL    Hemoglobin 15 0 12 0 - 17 0 g/dL    Hematocrit 45 4 36 5 - 49 3 %    MCV 87 82 - 98 fL    MCH 28 8 26 8 - 34 3 pg    MCHC 33 0 31 4 - 37 4 g/dL    RDW 13 1 11 6 - 15 1 %    MPV 10 6 8 9 - 12 7 fL    Platelets 312 093 - 969 Thousands/uL    nRBC 0 /100 WBCs    Neutrophils Relative 55 43 - 75 %    Immat GRANS % 0 0 - 2 %    Lymphocytes Relative 28 14 - 44 %    Monocytes Relative 11 4 - 12 %    Eosinophils Relative 5 0 - 6 %    Basophils Relative 1 0 - 1 %    Neutrophils Absolute 3 04 1 85 - 7 62 Thousands/µL    Immature Grans Absolute 0 02 0 00 - 0 20 Thousand/uL    Lymphocytes Absolute 1 51 0 60 - 4 47 Thousands/µL    Monocytes Absolute 0 60 0 17 - 1 22 Thousand/µL    Eosinophils Absolute 0 28 0 00 - 0 61 Thousand/µL    Basophils Absolute 0 03 0 00 - 0 10 Thousands/µL   Comprehensive metabolic panel    Collection Time: 02/27/21  9:06 AM   Result Value Ref Range    Sodium 144 136 - 145 mmol/L    Potassium 4 0 3 5 - 5 3 mmol/L    Chloride 109 (H) 100 - 108 mmol/L    CO2 31 21 - 32 mmol/L    ANION GAP 4 4 - 13 mmol/L    BUN 16 5 - 25 mg/dL    Creatinine 1 09 0 60 - 1 30 mg/dL    Glucose, Fasting 103 (H) 65 - 99 mg/dL    Calcium 9 2 8 3 - 10 1 mg/dL    AST 33 5 - 45 U/L    ALT 51 12 - 78 U/L    Alkaline Phosphatase 43 (L) 46 - 116 U/L    Total Protein 6 8 6 4 - 8 2 g/dL    Albumin 3 8 3 5 - 5 0 g/dL    Total Bilirubin 0 74 0 20 - 1 00 mg/dL    eGFR 67 ml/min/1 73sq m   Lipid panel    Collection Time: 02/27/21  9:06 AM   Result Value Ref Range    Cholesterol 139 50 - 200 mg/dL    Triglycerides 161 (H) <=150 mg/dL    HDL, Direct 34 (L) >=40 mg/dL    LDL Calculated 73 0 - 100 mg/dL    Non-HDL-Chol (CHOL-HDL) 105 mg/dl   Hemoglobin A1C    Collection Time: 02/27/21  9:06 AM   Result Value Ref Range    Hemoglobin A1C 5 4 Normal 3 8-5 6%; PreDiabetic 5 7-6 4%; Diabetic >=6 5%; Glycemic control for adults with diabetes <7 0% %     mg/dl   TSH, 3rd generation with Free T4 reflex    Collection Time: 02/27/21  9:06 AM   Result Value Ref Range    TSH 3RD GENERATON 2 020 0 358 - 3 740 uIU/mL   PSA, Total Screen    Collection Time: 02/27/21  9:06 AM   Result Value Ref Range    PSA 2 2 0 0 - 4 0 ng/mL       The following portions of the patient's history were reviewed and updated as appropriate: allergies, current medications, past family history, past medical history, past social history, past surgical history and problem list      Review of Systems   Constitutional: Negative for appetite change, chills, diaphoresis, fatigue, fever and unexpected weight change  HENT: Negative for postnasal drip and sneezing  Eyes: Negative for visual disturbance  Respiratory: Negative for chest tightness and shortness of breath  Cardiovascular: Negative for chest pain, palpitations and leg swelling  Gastrointestinal: Negative for abdominal pain and blood in stool  Endocrine: Negative for cold intolerance, heat intolerance, polydipsia, polyphagia and polyuria  Genitourinary: Negative for difficulty urinating, dysuria, frequency and urgency  Musculoskeletal: Negative for arthralgias and myalgias  Skin: Negative for rash and wound  Neurological: Negative for dizziness, weakness, light-headedness and headaches  Hematological: Negative for adenopathy  Psychiatric/Behavioral: Negative for confusion, dysphoric mood and sleep disturbance  The patient is not nervous/anxious            Objective:      /84 (BP Location: Left arm, Patient Position: Sitting, Cuff Size: Standard) Pulse 76   Temp (!) 97 4 °F (36 3 °C) (Temporal) Comment: no nsaids  Resp 16   Ht 5' 10" (1 778 m)   Wt 92 1 kg (203 lb)   BMI 29 13 kg/m²        Physical Exam  Constitutional:       General: He is not in acute distress  Appearance: He is well-developed  He is not diaphoretic  HENT:      Head: Normocephalic and atraumatic  Nose: Nose normal       Mouth/Throat:      Pharynx: Posterior oropharyngeal erythema present  Eyes:      Conjunctiva/sclera: Conjunctivae normal       Pupils: Pupils are equal, round, and reactive to light  Neck:      Musculoskeletal: Normal range of motion and neck supple  Thyroid: No thyromegaly  Vascular: No JVD  Trachea: No tracheal deviation  Cardiovascular:      Rate and Rhythm: Normal rate and regular rhythm  Pulses:           Dorsalis pedis pulses are 2+ on the right side and 2+ on the left side  Posterior tibial pulses are 2+ on the right side and 2+ on the left side  Heart sounds: Normal heart sounds  No murmur  No friction rub  No gallop  Pulmonary:      Effort: Pulmonary effort is normal  No respiratory distress  Breath sounds: Normal breath sounds  No wheezing or rales  Abdominal:      General: Bowel sounds are normal  There is no distension  Palpations: Abdomen is soft  Tenderness: There is no abdominal tenderness  Musculoskeletal: Normal range of motion  Feet:      Right foot:      Skin integrity: Callus and dry skin present  No ulcer, skin breakdown, erythema or warmth  Left foot:      Skin integrity: Callus and dry skin present  No ulcer, skin breakdown, erythema or warmth  Lymphadenopathy:      Cervical: No cervical adenopathy  Skin:     General: Skin is warm and dry  Findings: No rash  Neurological:      Mental Status: He is alert and oriented to person, place, and time  Cranial Nerves: No cranial nerve deficit  Sensory: Sensory deficit present     Psychiatric: Behavior: Behavior normal          Thought Content: Thought content normal          Judgment: Judgment normal      Diabetic Foot Exam    Patient's shoes and socks removed  Right Foot/Ankle   Right Foot Inspection  Skin Exam: skin normal, skin intact, dry skin, callus and callus no warmth, no erythema, no maceration, no abnormal color, no pre-ulcer and no ulcer                          Toe Exam: ROM and strength within normal limits and right toe deformity  Sensory   Vibration: intact  Proprioception: intact   Monofilament testing: intact  Vascular  Capillary refills: < 3 seconds  The right DP pulse is 2+  The right PT pulse is 2+  Left Foot/Ankle  Left Foot Inspection  Skin Exam: skin normal, skin intact, dry skin and callusno warmth, no erythema, no maceration, normal color, no pre-ulcer and no ulcer                         Toe Exam: ROM and strength within normal limits and left toe deformity                   Sensory   Vibration: intact  Proprioception: intact  Monofilament: intact  Vascular  Capillary refills: < 3 seconds  The left DP pulse is 2+  The left PT pulse is 2+  Assign Risk Category:  No deformity present; ;        Risk: 0   thick and long toenails noted, minimal callous to heel and great toes on left and right side  No skin opening, lesions, redness, or bruising noted  Normal monofilament testing to right and left foot

## 2021-03-02 NOTE — PROGRESS NOTES
Assessment and Plan:     Problem List Items Addressed This Visit     None        BMI Counseling: Body mass index is 29 13 kg/m²  The BMI is above normal  Nutrition recommendations include decreasing portion sizes and consuming healthier snacks  Exercise recommendations include exercising 3-5 times per week  No pharmacotherapy was ordered  Preventive health issues were discussed with patient, and age appropriate screening tests were ordered as noted in patient's After Visit Summary  Personalized health advice and appropriate referrals for health education or preventive services given if needed, as noted in patient's After Visit Summary       History of Present Illness:     Patient presents for Medicare Annual Wellness visit    Patient Care Team:  Lucia Rucker MD as PCP - MD [place] MD as Endoscopist     Problem List:     Patient Active Problem List   Diagnosis    Colon cancer 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[facility] [address]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    Allergic rhinitis     Anemia     Anxiety     Atherosclerosis     CAD (coronary artery disease)     Colitis     Depression     Diabetes mellitus ([address] )     Difficulty breathing     Gastritis     GERD (gastroesophageal reflux disease)     Hepatic cyst     Hyperlipidemia     Hypertension     IFG (impaired fasting glucose)     Myocardial infarction ([facility] [address] )     9 YEARS AGO    Psoriasis      Past Surgical History:   Procedure Laterality Date    ANGIOPLASTY      COLON SURGERY      CORONARY ARTERY BYPASS GRAFT      [place] COLONOSCOPY FLX DX W/COLLJ SPEC WHEN PFRMD N/A 2/21/2017    Procedure: COLONOSCOPY;  Surgeon: Baron Mars MD;  Location: [facility]; Service: Gastroenterology    [place] RMVL HEATHER CTR VAD W/SUBQ PORT/ CTR/PRPH INSJ Left 5/13/2016    Procedure: REMOVAL VENOUS PORT (PORT-A-CATH);   Surgeon: Jennifer Diaz MD;  [facility];  Service: Colorectal      Family History:     Family History   Problem Relation Age of Onset    Colon cancer Family     Diverticulosis Family         colonic    Cancer Father       Social History:     E-Cigarette/Vaping    E-Cigarette Use Never User      E-Cigarette/Vaping Substances    Nicotine No     THC No     CBD No     Flavoring No     Other No     Unknown No      Social History     Socioeconomic History    Marital status: /Civil Union     Spouse name: None    Number of children: None    Years of education: None    Highest education level: None   Occupational History    None   Social Needs    Financial resource strain: None    Food insecurity     Worry: None     Inability: None    Transportation needs     Medical: None     Non-medical: None   Tobacco Use    Smoking status: Never Smoker    Smokeless tobacco: Never Used   Substance and Sexual Activity    Alcohol use: Yes     Frequency: Monthly or less     Drinks per session: 1 or 2     Binge frequency: Never     Comment: social    Drug use: No    Sexual activity: Not Currently   Lifestyle    Physical activity     Days per week: 2 days     Minutes per session: 30 min    Stress: Not at all   Relationships    Social connections     Talks on phone: None     Gets together: None     Attends [employer] service: None     Active member of club or organization: None     Attends meetings of clubs or organizations: None     Relationship status: None    Intimate partner violence     Fear of current or ex partner: None     Emotionally abused: None     Physically abused: None     Forced sexual activity: None   Other Topics Concern    None   Social History Narrative    No advance directives      Medications and Allergies:     Current Outpatient Medications   Medication Sig Dispense Refill    ALPRAZolam (XANAX) 0 25 mg tablet Take 0 25 mg by mouth daily at bedtime as needed       aspirin 81 MG tablet Take 81 mg by mouth daily   betamethasone, augmented, (DIPROLENE) 0 05 % ointment Apply topically daily To psoriasis plaques till clear 50 g 2    calcipotriene (DOVONOX) 0 005 % ointment Apply topically 2 (two) times a day 60 g 3    enalapril (VASOTEC) 5 mg tablet TAKE 1/2 TABLET (2 5MG TOTAL) BY MOUTH DAILY 30 tablet 2    fenofibrate (TRICOR) 145 mg tablet Take 1 tablet (145 mg total) by mouth daily 90 tablet 3    fluticasone (FLONASE) 50 mcg/act nasal spray USE 2 SPRAYS INTO EACH NOSTRIL DAILY 1 Bottle 0    levocetirizine (XYZAL) 5 MG tablet Take 5 mg by mouth daily       metoprolol succinate (TOPROL-XL) 25 mg 24 hr tablet Take 1 tablet (25 mg total) by mouth daily 90 tablet 2    nitroglycerin (NITROSTAT) 0 4 mg SL tablet Place 0 4 mg under the tongue every 5 (five) minutes as needed for chest pain      omeprazole (PriLOSEC) 20 mg delayed release capsule TAKE ONE CAPSULE BY MOUTH EVERY DAY 30 capsule 2    PARoxetine (PAXIL) 30 mg tablet TAKE ONE TABLET BY MOUTH EVERY DAY 30 tablet 5    simvastatin (ZOCOR) 40 mg tablet Take 1 tablet (40 mg total) by mouth daily at bedtime 90 tablet 3    triamcinolone (KENALOG) 0 1 % cream Apply topically as needed for irritation 15 g 0     No current facility-administered medications for this visit        Allergies   Allergen Reactions    Penicillins Hives      Immunizations:     Immunization History   Administered Date(s) Administered    Influenza, seasonal, injectable 1950    Pneumococcal Polysaccharide PPV23 1950    Tdap 1950    Zoster 1950      Health Maintenance:         Topic Date Due    Colonoscopy Surveillance  02/21/2020  Hepatitis C Screening  Completed         Topic Date Due    DTaP,Tdap,and Td Vaccines (1 - Tdap) 01/01/1971    Pneumococcal Vaccine: 65+ Years (1 of 1 - PPSV23) 01/01/2013    Influenza Vaccine (1) 09/01/2020      Medicare Health Risk Assessment:     /84 (BP Location: Left arm, Patient Position: Sitting, Cuff Size: Standard)   Pulse 76   Temp (!) 97 4 °F (36 3 °C) (Temporal) Comment: no nsaids  Resp 16   Ht 5' 10" (1 778 m)   Wt 92 1 kg (203 lb)   BMI 29 13 kg/m²      Ambrocio Kulkarni is here for his Subsequent Wellness visit  Health Risk Assessment:   Patient rates overall health as very good  Patient feels that their physical health rating is same  Eyesight was rated as slightly worse  Hearing was rated as same  Patient feels that their emotional and mental health rating is same  Pain experienced in the last 7 days has been none  Patient states that he has experienced no weight loss or gain in last 6 months  Depression Screening:   PHQ-2 Score: 0  PHQ-9 Score: 0      Fall Risk Screening: In the past year, patient has experienced: no history of falling in past year      Home Safety:  Patient does not have trouble with stairs inside or outside of their home  Patient has working smoke alarms and has working carbon monoxide detector  Home safety hazards include: none  Nutrition:   Current diet is Frequent junk food and Regular  Medications:   Patient is currently taking over-the-counter supplements  OTC medications include: see medication list  Patient is able to manage medications  Activities of Daily Living (ADLs)/Instrumental Activities of Daily Living (IADLs):   Walk and transfer into and out of bed and chair?: Yes  Dress and groom yourself?: Yes    Bathe or shower yourself?: Yes    Feed yourself?  Yes  Do your laundry/housekeeping?: Yes  Manage your money, pay your bills and track your expenses?: Yes  Make your own meals?: Yes    Do your own shopping?: Yes    Previous Hospitalizations: Any hospitalizations or ED visits within the last 12 months?: No      Advance Care Planning:   Living will: No    Advanced directive: No    Five wishes given: No      Cognitive Screening:   Provider or family/friend/caregiver concerned regarding cognition?: No    PREVENTIVE SCREENINGS      Cardiovascular Screening:    General: Screening Not Indicated and History Lipid Disorder      Diabetes Screening:     General: Screening Not Indicated and History Diabetes      Colorectal Cancer Screening:     General: History Colorectal Cancer      Prostate Cancer Screening:    General: Screening Current      Osteoporosis Screening:    General: Screening Not Indicated      Abdominal Aortic Aneurysm (AAA) Screening:    Risk factors include: age between 73-69 yo        Lung Cancer Screening:     General: Screening Not Indicated      Hepatitis C Screening:    General: Screening Current      YONATAN Ramos

## 2021-03-02 NOTE — PATIENT INSTRUCTIONS
Diabetes Mellitus- HGA1C at goal  Continue limiting sweets  Referral provided to patient for podiatry for DM foot care and management  Referral provided to patient for opthalmology for follow up with DM retinopathy  Hypertension- Continue on current medications, limit salt, fats  Hyperlipidemia- Elevated triglycerides and low HDL  Limit snacks and sweets  Increase activity to 30 minutes daily  Continue statin    Depression/anxiety- Managed well with current treatments  Malignant neoplasm of Colon- Colonoscopy due, encouraged patient to call to schedule as soon as he is able  Health Maintenance-  Discussed Pneumovax, Shingrix (obtain at pharmacy), and COVID 19 vaccine  Patient will consider these vaccines and reach out if he is interested  Patient provided with number to schedule COVID vaccine  Vivid dreams with period limb movement ? Related to paxil- already takes that in am  Could consider sleep study w/ rare history of PND   He will consider    CAD stable without angina continue to modify risk factors

## 2021-03-08 DIAGNOSIS — F32.A DEPRESSION, UNSPECIFIED DEPRESSION TYPE: ICD-10-CM

## 2021-03-08 RX ORDER — PAROXETINE 30 MG/1
TABLET, FILM COATED ORAL
Qty: 30 TABLET | Refills: 5 | Status: SHIPPED | OUTPATIENT
Start: 2021-03-08 | End: 2021-09-15

## 2021-03-31 ENCOUNTER — OFFICE VISIT (OUTPATIENT)
Dept: CARDIOLOGY CLINIC | Facility: CLINIC | Age: 71
End: 2021-03-31
Payer: MEDICARE

## 2021-03-31 VITALS
DIASTOLIC BLOOD PRESSURE: 70 MMHG | HEIGHT: 70 IN | SYSTOLIC BLOOD PRESSURE: 130 MMHG | BODY MASS INDEX: 27.77 KG/M2 | OXYGEN SATURATION: 99 % | RESPIRATION RATE: 18 BRPM | HEART RATE: 60 BPM | WEIGHT: 194 LBS

## 2021-03-31 DIAGNOSIS — I10 ESSENTIAL HYPERTENSION: ICD-10-CM

## 2021-03-31 DIAGNOSIS — E78.2 MIXED HYPERLIPIDEMIA: ICD-10-CM

## 2021-03-31 DIAGNOSIS — I25.10 CAD S/P PERCUTANEOUS CORONARY ANGIOPLASTY: Primary | ICD-10-CM

## 2021-03-31 DIAGNOSIS — Z98.61 CAD S/P PERCUTANEOUS CORONARY ANGIOPLASTY: Primary | ICD-10-CM

## 2021-03-31 DIAGNOSIS — I51.89 DIASTOLIC DYSFUNCTION: ICD-10-CM

## 2021-03-31 PROCEDURE — 99214 OFFICE O/P EST MOD 30 MIN: CPT | Performed by: INTERNAL MEDICINE

## 2021-03-31 NOTE — PROGRESS NOTES
CARDIOLOGY OFFICE VISIT  [facility]  [address], [facility], [address] [facility], [facility] Y[place] Rubio  Tel: [phone]      NAME: Lena Carter  AGE: 66 y o  SEX: male  : 1950   MRN: [id number]      Chief Complaint:  Chief Complaint   Patient presents with    Follow-up     6 months          History of Present Illness:   Patient comes for follow up  States he is doing well from cardiac stand point and denies chest pain / pressure, SOB, palpitations, lightheadedness, syncope, swelling feet, orthopnea, PND, claudication  CAD s/p PCI in , s/p CABG x2 in  at [facility]  -  States is doing well cardiac wise with no cardiac symptoms  Taking all medications regularly  Has not used SL NTG since the last clinic visit  HTN, diastolic dysfunction -  Has been hypertensive for many years  Taking medications regularly  Denies lightheadedness, headache, medication side effects  HLP -  Has had hyperlipidemia for many years  Taking statin regularly along with diet control  Denies myalgia  PCP closely monitoring the blood work  Past Medical History:  Past Medical History:   Diagnosis Date    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[facility] [address]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    Allergic rhinitis     Anemia     Anxiety     Atherosclerosis     CAD (coronary artery disease)     Colitis     Depression     Diabetes mellitus (HCC)     Difficulty breathing     Gastritis     GERD (gastroesophageal reflux disease)     Hepatic cyst     Hyperlipidemia     Hypertension     IFG (impaired fasting glucose)     Myocardial infarction (HCC)     9 YEARS AGO    Psoriasis          Past Surgical History:  Past Surgical History:   Procedure Laterality Date    ANGIOPLASTY      COLON SURGERY      CORONARY ARTERY BYPASS GRAFT      DC COLONOSCOPY FLX DX W/COLLJ SPEC WHEN PFRMD N/A 2017    Procedure: COLONOSCOPY;  Surgeon: Gabriella An MD;  Location: [facility];   Service: Gastroenterology  AL RMVL HEATHER CTR VAD W/SUBQ PORT/ CTR/PRPH INSJ Left 5/13/2016    Procedure: REMOVAL VENOUS PORT (PORT-A-CATH);   Surgeon: Samira Recio MD;  Location: [facility];  Service: Colorectal         Family History:  Family History   Problem Relation Age of Onset    Colon cancer Family     Diverticulosis Family         colonic    Cancer Father          Social History:  Social History     Socioeconomic History    Marital status: /Civil Union     Spouse name: None    Number of children: None    Years of education: None    Highest education level: None   Occupational History    None   Social Needs    Financial resource strain: None    Food insecurity     Worry: None     Inability: None    Transportation needs     Medical: None     Non-medical: None   Tobacco Use    Smoking status: Never Smoker    Smokeless tobacco: Never Used   Substance and Sexual Activity    Alcohol use: Yes     Frequency: Monthly or less     Drinks per session: 1 or 2     Binge frequency: Never     Comment: social    Drug use: No    Sexual activity: Not Currently   Lifestyle    Physical activity     Days per week: 2 days     Minutes per session: 30 min    Stress: Not at all   Relationships    Social connections     Talks on phone: None     Gets together: None     Attends Synagogue service: None     Active member of club or organization: None     Attends meetings of clubs or organizations: None     Relationship status: None    Intimate partner violence     Fear of current or ex partner: None     Emotionally abused: None     Physically abused: None     Forced sexual activity: None   Other Topics Concern    None   Social History Narrative    No advance directives         Active Problems:  Patient Active Problem List   Diagnosis    Colon cancer ([facility] [address] )    CAD S/P percutaneous coronary angioplasty    Gastritis    Hypertension    Hyperlipidemia    Hx of CABG    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 ([facility] )    Major depressive disorder with single episode, in full remission ([facility] )    Type 2 diabetes mellitus without complication, without long-term current use of insulin ([facility] )         The following portions of the patient's history were reviewed and updated as appropriate: past medical history, past surgical history, past family history,  past social history, current medications, allergies and problem list       Review of Systems:  Constitutional: Denies fever, chills  Eyes: Denies eye redness, eye discharge  ENT: Denies hearing loss, tinnitus, sneezing, nasal discharge, sore throat   Respiratory: Denies cough, expectoration, hemoptysis, shortness of breath  Cardiovascular: Denies chest pain, palpitations, orthopnea, PND, lower extremity swelling  Gastrointestinal: Denies abdominal pain, nausea, vomiting, hematemesis, diarrhea, bloody stools  Genito-Urinary: Denies dysuria, incontinence  Musculoskeletal: Denies back pain, joint pain, muscle pain  Neurologic: Denies lightheadedness, syncope, headache, seizures  Endocrine: Denies polydipsia, temperature intolerance  Allergy and Immunology: Denies hives, insect bite sensitivity  Hematological and Lymphatic: Denies bleeding problems, swollen glands   Psychological: Denies depression, suicidal ideation, anxiety, panic  Dermatological: Denies pruritus, rash, skin lesion changes      Vitals:  Vitals:    03/31/21 0946   BP: 130/70   Pulse: 60   Resp: 18   SpO2: 99%       Body mass index is 27 84 kg/m²  Weight (last 2 days)     Date/Time   Weight    03/31/21 0946   88 (194)                Physical Examination:  General: Patient is not in acute distress  Awake, alert, oriented in time, place and person  Responding to commands  Head: Normocephalic  Atraumatic  Eyes: Both pupils normal sized, round and reactive to light  Nonicteric  ENT: Normal external ear canals  Neck: Supple  JVP not raised   Trachea central  No thyromegaly  Lungs: Bilateral bronchovascular breath sounds with no crackles or rhonchi  Chest wall: No tenderness  Cardiovascular: RRR  S1 and S2 normal  No murmur, rub or gallop  Gastrointestinal: Abdomen soft, nontender  No guarding or rigidity  Liver and spleen not palpable  Bowel sounds present  Neurologic: Patient is awake, alert, oriented in time, place and person  Responding to command  Moving all extremities  Integumentary:  No skin rash  Lymphatic: No cervical lymphadenopathy  Back: Symmetric   No CVA tenderness  Extremities: No clubbing, cyanosis or edema      Laboratory Results:  CBC with diff:   Lab Results   Component Value Date    WBC 5 48 02/27/2021    WBC 6 1 03/16/2015    RBC 5 21 02/27/2021    RBC 4 91 03/16/2015    HGB 15 0 02/27/2021    HGB 14 7 03/16/2015    HCT 45 4 02/27/2021    HCT 42 0 03/16/2015    MCV 87 02/27/2021    MCV 85 03/16/2015    MCH 28 8 02/27/2021    MCH 29 9 03/16/2015    RDW 13 1 02/27/2021    RDW 12 0 03/16/2015     02/27/2021     03/16/2015       CMP:  Lab Results   Component Value Date    CREATININE 1 09 02/27/2021    CREATININE 1 1 03/16/2015    BUN 16 02/27/2021    BUN 12 03/16/2015     (H) 03/16/2015    K 4 0 02/27/2021    K 4 0 03/16/2015     (H) 02/27/2021     (H) 03/16/2015    CO2 31 02/27/2021    CO2 29 4 03/16/2015    GLUCOSE 104 03/16/2015    PROT 6 8 03/16/2015    ALKPHOS 43 (L) 02/27/2021    ALKPHOS 89 03/16/2015    ALT 51 02/27/2021    ALT 43 03/16/2015    AST 33 02/27/2021    AST 23 03/16/2015    BILIDIR 0 19 09/07/2018       Lab Results   Component Value Date    HGBA1C 5 4 02/27/2021    HGBA1C 5 1 03/16/2015    MG 2 0 04/01/2014    PHOS 2 4 (L) 04/01/2014         Lipid Profile:   Lab Results   Component Value Date    CHOL 129 03/16/2015    CHOL 148 02/12/2014     Lab Results   Component Value Date    HDL 34 (L) 02/27/2021    HDL 36 (L) 09/03/2019    HDL 36 (L) 02/23/2019     Lab Results   Component Value Date    LDLCALC 73 02/27/2021    LDLCALC 81 09/03/2019    LDLCALC 72 2019     Lab Results   Component Value Date    TRIG 161 (H) 2021    TRIG 117 2019    TRIG 111 2019       Cardiac testing:   Results for orders placed during the hospital encounter of 20   Echo complete with contrast if indicated    Narrative [address], [address] [facility]  [phone]    Transthoracic Echocardiogram  2D, M-mode, and Color Doppler    Study date:  2020    Patient: Yolanda Patel  MR number: [id number]  Account number: [de-identified]  : 1950  Age: 65 years  Gender: Male  Status: Outpatient  Location: [facility]  Height: 70 in  Weight: 198 7 lb  BP: 134/ 80 mmHg    Indications: CAD  Diagnoses: I25 10 - Atherosclerotic heart disease of native coronary artery without angina pectoris    Sonographer:  Jennifer Strickland, [facility]  Primary Physician:  Kory Bonilla MD  Referring Physician:  Ulysses Alvarenga MD  Group:  [facility]  Interpreting Physician:  Ulysses Alvarenga MD    SUMMARY    LEFT VENTRICLE:  Systolic function was normal  Ejection fraction was estimated to be 60 %  There were no regional wall motion abnormalities  Concentric hypertrophy was present  Features were consistent with a pseudonormal left ventricular filling pattern, with concomitant abnormal relaxation and increased filling pressure (grade 2 diastolic dysfunction)  RIGHT VENTRICLE:  The size was normal   Systolic function was normal     LEFT ATRIUM:  The atrium was mildly dilated  MITRAL VALVE:  There was mild regurgitation  TRICUSPID VALVE:  There was mild regurgitation  Pulmonary artery systolic pressure was at the upper limits of normal     PULMONIC VALVE:  There was trace regurgitation  HISTORY: PRIOR HISTORY: MI  s/p angioplasty CABG  Risk factors: hypertension, diabetes, and medication-treated hypercholesterolemia  PROCEDURE: The study was performed in the [address] [place] [address]   This was a routine study  The transthoracic approach was used  The study included complete 2D imaging, M-mode, and color Doppler  The heart rate was 66 bpm, at the  start of the study  Images were obtained from the parasternal, apical, subcostal, and suprasternal notch acoustic windows  Image quality was adequate  LEFT VENTRICLE: Size was normal  Systolic function was normal  Ejection fraction was estimated to be 60 %  There were no regional wall motion abnormalities  Concentric hypertrophy was present  No evidence of apical thrombus  DOPPLER:  Features were consistent with a pseudonormal left ventricular filling pattern, with concomitant abnormal relaxation and increased filling pressure (grade 2 diastolic dysfunction)  RIGHT VENTRICLE: The size was normal  Systolic function was normal  Wall thickness was normal     LEFT ATRIUM: The atrium was mildly dilated  RIGHT ATRIUM: Size was normal     MITRAL VALVE: Valve structure was normal  There was normal leaflet separation  DOPPLER: The transmitral velocity was within the normal range  There was no evidence for stenosis  There was mild regurgitation  AORTIC VALVE: The valve was trileaflet  Leaflets exhibited normal thickness and normal cuspal separation  DOPPLER: Transaortic velocity was within the normal range  There was no evidence for stenosis  There was no significant  regurgitation  TRICUSPID VALVE: The valve structure was normal  There was normal leaflet separation  DOPPLER: The transtricuspid velocity was within the normal range  There was no evidence for stenosis  There was mild regurgitation  Pulmonary artery  systolic pressure was at the upper limits of normal     PULMONIC VALVE: Leaflets exhibited normal thickness, no calcification, and normal cuspal separation  DOPPLER: The transpulmonic velocity was within the normal range  There was trace regurgitation  PERICARDIUM: There was no pericardial effusion   The pericardium was normal in appearance  AORTA: The root exhibited normal size  SYSTEMIC VEINS: IVC: The inferior vena cava was normal in size  SYSTEM MEASUREMENT TABLES    2D  %FS: 33 3 %  Ao Diam: 3 38 cm  EDV(Teich): 115 31 ml  EF(Teich): 61 75 %  ESV(Teich): 44 1 ml  IVSd: 1 2 cm  LA Area: 21 27 cm2  LA Diam: 4 38 cm  LVEDV MOD A4C: 136 28 ml  LVEF MOD A4C: 42 6 %  LVESV MOD A4C: 78 22 ml  LVIDd: 4 95 cm  LVIDs: 3 3 cm  LVLd A4C: 8 76 cm  LVLs A4C: 7 38 cm  LVPWd: 1 53 cm  RA Area: 16 14 cm2  RVIDd: 4 63 cm  RWT: 0 62  SV MOD A4C: 58 05 ml  SV(Teich): 71 21 ml    CW  AV Env  Ti: 277 83 ms  AV MaxP 16 mmHg  AV VTI: 23 21 cm  AV Vmax: 1 24 m/s  AV Vmean: 0 84 m/s  AV meanPG: 3 2 mmHg  TR MaxP 99 mmHg  TR Vmax: 2 87 m/s    MM  TAPSE: 1 84 cm    PW  E': 0 08 m/s  E/E': 11 27  LVOT Env  Ti: 296 86 ms  LVOT VTI: 25 26 cm  LVOT Vmax: 1 24 m/s  LVOT Vmean: 0 85 m/s  LVOT maxP 15 mmHg  LVOT meanPG: 3 37 mmHg  MV A Kevin: 0 79 m/s  MV Dec Gregory: 3 08 m/s2  MV DecT: 295 52 ms  MV E Kevin: 0 91 m/s  MV E/A Ratio: 1 15  MV PHT: 85 7 ms  MVA By PHT: 2 57 cm2    [facility] Accredited Echocardiography Laboratory    Prepared and electronically signed by    Leana Sacks, MD  Signed 2020 16:13:18         Medications:    Current Outpatient Medications:     aspirin 81 MG tablet, Take 81 mg by mouth daily  , Disp: , Rfl:     betamethasone, augmented, (DIPROLENE) 0 05 % ointment, Apply topically daily To psoriasis plaques till clear, Disp: 50 g, Rfl: 2    calcipotriene (DOVONOX) 0 005 % ointment, Apply topically 2 (two) times a day, Disp: 60 g, Rfl: 3    enalapril (VASOTEC) 5 mg tablet, TAKE 1/2 TABLET (2 5MG TOTAL) BY MOUTH DAILY, Disp: 30 tablet, Rfl: 2    fenofibrate (TRICOR) 145 mg tablet, Take 1 tablet (145 mg total) by mouth daily, Disp: 90 tablet, Rfl: 3    fluticasone (FLONASE) 50 mcg/act nasal spray, USE 2 SPRAYS INTO EACH NOSTRIL DAILY, Disp: 1 Bottle, Rfl: 0    levocetirizine (XYZAL) 5 MG tablet, Take 5 mg by mouth daily , Disp: , Rfl:     metoprolol succinate (TOPROL-XL) 25 mg 24 hr tablet, Take 1 tablet (25 mg total) by mouth daily, Disp: 90 tablet, Rfl: 2    nitroglycerin (NITROSTAT) 0 4 mg SL tablet, Place 0 4 mg under the tongue every 5 (five) minutes as needed for chest pain, Disp: , Rfl:     omeprazole (PriLOSEC) 20 mg delayed release capsule, TAKE ONE CAPSULE BY MOUTH EVERY DAY, Disp: 30 capsule, Rfl: 2    PARoxetine (PAXIL) 30 mg tablet, TAKE ONE TABLET BY MOUTH EVERY DAY, Disp: 30 tablet, Rfl: 5    simvastatin (ZOCOR) 40 mg tablet, Take 1 tablet (40 mg total) by mouth daily at bedtime, Disp: 90 tablet, Rfl: 3    triamcinolone (KENALOG) 0 1 % cream, Apply topically as needed for irritation (Patient not taking: Reported on 3/31/2021), Disp: 15 g, Rfl: 0      Allergies: Allergies   Allergen Reactions    Penicillins Hives         Assessment and Plan:  1  CAD S/P percutaneous coronary angioplasty   stable  Continue aspirin, statin, beta-blocker, Ace inhibitor    2  Essential hypertension   BP stable  Continue current medications  Continue to monitor BP at home and call if abnormal    3  Diastolic dysfunction   tight BP control    4  Mixed hyperlipidemia   continue statin and diet control  His PCP closely monitors his blood work  Last lipid panel report discussed with the patient    Recommend aggressive risk factor modification and therapeutic lifestyle changes  Low-salt, low-calorie, low-fat, low-cholesterol diet with regular exercise and to optimize weight  I will defer the ordering and monitoring of necessity lab studies to you, but I am available and happy to review and manage any of the data at your request in the future  Discussed concepts of atherosclerosis, including signs and symptoms of cardiac disease  Previous studies were reviewed  Safety measures were reviewed  Questions were entertained and answered  Patient was advised to report any problems requiring medical attention  Follow-up with PCP and appropriate specialist and lab work as discussed  Return for follow up visit as scheduled or earlier, if needed  Thank you for allowing me to participate in the care and evaluation of your patient  Should you have any questions, please feel free to contact me        Vivian Naidu MD  3/31/2021,10:20 AM

## 2021-06-22 DIAGNOSIS — R05.9 COUGH: ICD-10-CM

## 2021-06-22 RX ORDER — FLUTICASONE PROPIONATE 50 MCG
SPRAY, SUSPENSION (ML) NASAL
Qty: 9.9 ML | Refills: 0 | Status: SHIPPED | OUTPATIENT
Start: 2021-06-22 | End: 2021-08-31

## 2021-07-20 DIAGNOSIS — E78.49 OTHER HYPERLIPIDEMIA: ICD-10-CM

## 2021-07-20 DIAGNOSIS — E78.2 MIXED HYPERLIPIDEMIA: ICD-10-CM

## 2021-07-20 RX ORDER — FENOFIBRATE 145 MG/1
TABLET, COATED ORAL
Qty: 90 TABLET | Refills: 3 | Status: SHIPPED | OUTPATIENT
Start: 2021-07-20 | End: 2022-05-02

## 2021-07-20 RX ORDER — SIMVASTATIN 40 MG
TABLET ORAL
Qty: 90 TABLET | Refills: 3 | Status: SHIPPED | OUTPATIENT
Start: 2021-07-20 | End: 2022-07-11

## 2021-08-31 DIAGNOSIS — R05.9 COUGH: ICD-10-CM

## 2021-08-31 RX ORDER — FLUTICASONE PROPIONATE 50 MCG
SPRAY, SUSPENSION (ML) NASAL
Qty: 9.9 ML | Refills: 0 | Status: SHIPPED | OUTPATIENT
Start: 2021-08-31

## 2021-09-15 DIAGNOSIS — F32.A DEPRESSION, UNSPECIFIED DEPRESSION TYPE: ICD-10-CM

## 2021-09-15 RX ORDER — PAROXETINE 30 MG/1
TABLET, FILM COATED ORAL
Qty: 30 TABLET | Refills: 5 | Status: SHIPPED | OUTPATIENT
Start: 2021-09-15 | End: 2022-03-13

## 2021-09-16 DIAGNOSIS — I10 ESSENTIAL HYPERTENSION: ICD-10-CM

## 2021-09-16 RX ORDER — METOPROLOL SUCCINATE 25 MG/1
25 TABLET, EXTENDED RELEASE ORAL DAILY
Qty: 90 TABLET | Refills: 3 | Status: SHIPPED | OUTPATIENT
Start: 2021-09-16

## 2021-10-26 ENCOUNTER — TELEPHONE (OUTPATIENT)
Dept: INTERNAL MEDICINE CLINIC | Facility: CLINIC | Age: 71
End: 2021-10-26

## 2021-11-15 DIAGNOSIS — K21.9 GASTROESOPHAGEAL REFLUX DISEASE WITHOUT ESOPHAGITIS: ICD-10-CM

## 2021-11-15 RX ORDER — OMEPRAZOLE 20 MG/1
CAPSULE, DELAYED RELEASE ORAL
Qty: 30 CAPSULE | Refills: 2 | Status: SHIPPED | OUTPATIENT
Start: 2021-11-15 | End: 2022-02-13

## 2021-11-17 ENCOUNTER — OFFICE VISIT (OUTPATIENT)
Dept: CARDIOLOGY CLINIC | Facility: CLINIC | Age: 71
End: 2021-11-17
Payer: MEDICARE

## 2021-11-17 VITALS
OXYGEN SATURATION: 99 % | SYSTOLIC BLOOD PRESSURE: 138 MMHG | WEIGHT: 207 LBS | HEART RATE: 59 BPM | BODY MASS INDEX: 29.63 KG/M2 | HEIGHT: 70 IN | DIASTOLIC BLOOD PRESSURE: 76 MMHG

## 2021-11-17 DIAGNOSIS — I25.10 CAD S/P PERCUTANEOUS CORONARY ANGIOPLASTY: Primary | ICD-10-CM

## 2021-11-17 DIAGNOSIS — I10 ESSENTIAL HYPERTENSION: ICD-10-CM

## 2021-11-17 DIAGNOSIS — E78.2 MIXED HYPERLIPIDEMIA: ICD-10-CM

## 2021-11-17 DIAGNOSIS — I51.89 DIASTOLIC DYSFUNCTION: ICD-10-CM

## 2021-11-17 DIAGNOSIS — Z98.61 CAD S/P PERCUTANEOUS CORONARY ANGIOPLASTY: Primary | ICD-10-CM

## 2021-11-17 PROCEDURE — 99214 OFFICE O/P EST MOD 30 MIN: CPT | Performed by: INTERNAL MEDICINE

## 2021-12-24 DIAGNOSIS — I10 HYPERTENSION, UNSPECIFIED TYPE: ICD-10-CM

## 2021-12-25 RX ORDER — ENALAPRIL MALEATE 5 MG/1
TABLET ORAL
Qty: 30 TABLET | Refills: 0 | Status: SHIPPED | OUTPATIENT
Start: 2021-12-25 | End: 2022-02-27

## 2022-01-28 ENCOUNTER — APPOINTMENT (OUTPATIENT)
Dept: LAB | Facility: CLINIC | Age: 72
End: 2022-01-28
Payer: MEDICARE

## 2022-01-28 ENCOUNTER — OFFICE VISIT (OUTPATIENT)
Dept: INTERNAL MEDICINE CLINIC | Facility: CLINIC | Age: 72
End: 2022-01-28
Payer: MEDICARE

## 2022-01-28 VITALS
WEIGHT: 207 LBS | HEART RATE: 74 BPM | BODY MASS INDEX: 29.63 KG/M2 | SYSTOLIC BLOOD PRESSURE: 110 MMHG | TEMPERATURE: 98 F | DIASTOLIC BLOOD PRESSURE: 70 MMHG | OXYGEN SATURATION: 96 % | HEIGHT: 70 IN

## 2022-01-28 DIAGNOSIS — C18.9 MALIGNANT NEOPLASM OF COLON, UNSPECIFIED PART OF COLON (HCC): ICD-10-CM

## 2022-01-28 DIAGNOSIS — F32.5 MAJOR DEPRESSIVE DISORDER WITH SINGLE EPISODE, IN FULL REMISSION (HCC): ICD-10-CM

## 2022-01-28 DIAGNOSIS — M35.9: ICD-10-CM

## 2022-01-28 DIAGNOSIS — E78.5 HYPERLIPIDEMIA, UNSPECIFIED HYPERLIPIDEMIA TYPE: ICD-10-CM

## 2022-01-28 DIAGNOSIS — D89.89 OTHER SPECIFIED DISORDERS INVOLVING THE IMMUNE MECHANISM, NOT ELSEWHERE CLASSIFIED (HCC): ICD-10-CM

## 2022-01-28 DIAGNOSIS — L40.9 PSORIASIS: ICD-10-CM

## 2022-01-28 DIAGNOSIS — E11.9 TYPE 2 DIABETES MELLITUS WITHOUT COMPLICATION, WITHOUT LONG-TERM CURRENT USE OF INSULIN (HCC): ICD-10-CM

## 2022-01-28 DIAGNOSIS — H60.93 OTITIS EXTERNA OF BOTH EARS, UNSPECIFIED CHRONICITY, UNSPECIFIED TYPE: Primary | ICD-10-CM

## 2022-01-28 DIAGNOSIS — I10 HYPERTENSION, UNSPECIFIED TYPE: ICD-10-CM

## 2022-01-28 DIAGNOSIS — E78.2 MIXED HYPERLIPIDEMIA: ICD-10-CM

## 2022-01-28 LAB
ALBUMIN SERPL BCP-MCNC: 3.9 G/DL (ref 3.5–5)
ALP SERPL-CCNC: 42 U/L (ref 46–116)
ALT SERPL W P-5'-P-CCNC: 40 U/L (ref 12–78)
ANION GAP SERPL CALCULATED.3IONS-SCNC: 5 MMOL/L (ref 4–13)
AST SERPL W P-5'-P-CCNC: 32 U/L (ref 5–45)
BASOPHILS # BLD AUTO: 0.05 THOUSANDS/ΜL (ref 0–0.1)
BASOPHILS NFR BLD AUTO: 1 % (ref 0–1)
BILIRUB SERPL-MCNC: 1.14 MG/DL (ref 0.2–1)
BUN SERPL-MCNC: 10 MG/DL (ref 5–25)
CALCIUM SERPL-MCNC: 9.6 MG/DL (ref 8.3–10.1)
CEA SERPL-MCNC: 0.6 NG/ML (ref 0–3)
CHLORIDE SERPL-SCNC: 109 MMOL/L (ref 100–108)
CHOLEST SERPL-MCNC: 127 MG/DL
CO2 SERPL-SCNC: 30 MMOL/L (ref 21–32)
CREAT SERPL-MCNC: 1.3 MG/DL (ref 0.6–1.3)
EOSINOPHIL # BLD AUTO: 0.3 THOUSAND/ΜL (ref 0–0.61)
EOSINOPHIL NFR BLD AUTO: 4 % (ref 0–6)
ERYTHROCYTE [DISTWIDTH] IN BLOOD BY AUTOMATED COUNT: 13.1 % (ref 11.6–15.1)
GFR SERPL CREATININE-BSD FRML MDRD: 53 ML/MIN/1.73SQ M
GLUCOSE SERPL-MCNC: 102 MG/DL (ref 65–140)
HCT VFR BLD AUTO: 42.4 % (ref 36.5–49.3)
HDLC SERPL-MCNC: 30 MG/DL
HGB BLD-MCNC: 13.8 G/DL (ref 12–17)
IMM GRANULOCYTES # BLD AUTO: 0.02 THOUSAND/UL (ref 0–0.2)
IMM GRANULOCYTES NFR BLD AUTO: 0 % (ref 0–2)
LDLC SERPL CALC-MCNC: 58 MG/DL (ref 0–100)
LYMPHOCYTES # BLD AUTO: 1.62 THOUSANDS/ΜL (ref 0.6–4.47)
LYMPHOCYTES NFR BLD AUTO: 22 % (ref 14–44)
MCH RBC QN AUTO: 27.7 PG (ref 26.8–34.3)
MCHC RBC AUTO-ENTMCNC: 32.5 G/DL (ref 31.4–37.4)
MCV RBC AUTO: 85 FL (ref 82–98)
MONOCYTES # BLD AUTO: 0.62 THOUSAND/ΜL (ref 0.17–1.22)
MONOCYTES NFR BLD AUTO: 9 % (ref 4–12)
NEUTROPHILS # BLD AUTO: 4.63 THOUSANDS/ΜL (ref 1.85–7.62)
NEUTS SEG NFR BLD AUTO: 64 % (ref 43–75)
NRBC BLD AUTO-RTO: 0 /100 WBCS
PLATELET # BLD AUTO: 229 THOUSANDS/UL (ref 149–390)
PMV BLD AUTO: 10.4 FL (ref 8.9–12.7)
POTASSIUM SERPL-SCNC: 3.7 MMOL/L (ref 3.5–5.3)
PROT SERPL-MCNC: 6.9 G/DL (ref 6.4–8.2)
RBC # BLD AUTO: 4.98 MILLION/UL (ref 3.88–5.62)
SODIUM SERPL-SCNC: 144 MMOL/L (ref 136–145)
TRIGL SERPL-MCNC: 193 MG/DL
TSH SERPL DL<=0.05 MIU/L-ACNC: 1.02 UIU/ML (ref 0.36–3.74)
WBC # BLD AUTO: 7.24 THOUSAND/UL (ref 4.31–10.16)

## 2022-01-28 PROCEDURE — 80053 COMPREHEN METABOLIC PANEL: CPT

## 2022-01-28 PROCEDURE — 84443 ASSAY THYROID STIM HORMONE: CPT

## 2022-01-28 PROCEDURE — 36415 COLL VENOUS BLD VENIPUNCTURE: CPT

## 2022-01-28 PROCEDURE — 85025 COMPLETE CBC W/AUTO DIFF WBC: CPT

## 2022-01-28 PROCEDURE — 99213 OFFICE O/P EST LOW 20 MIN: CPT | Performed by: NURSE PRACTITIONER

## 2022-01-28 PROCEDURE — 82378 CARCINOEMBRYONIC ANTIGEN: CPT

## 2022-01-28 PROCEDURE — 80061 LIPID PANEL: CPT

## 2022-01-28 PROCEDURE — 83036 HEMOGLOBIN GLYCOSYLATED A1C: CPT

## 2022-01-28 RX ORDER — BETAMETHASONE DIPROPIONATE 0.5 MG/G
OINTMENT TOPICAL
Qty: 30 G | Refills: 0 | Status: CANCELLED | OUTPATIENT
Start: 2022-01-28

## 2022-01-28 RX ORDER — BETAMETHASONE DIPROPIONATE 0.5 MG/G
OINTMENT TOPICAL 2 TIMES DAILY
Qty: 50 G | Refills: 0 | Status: SHIPPED | OUTPATIENT
Start: 2022-01-28 | End: 2022-03-17 | Stop reason: SDUPTHER

## 2022-01-28 NOTE — PATIENT INSTRUCTIONS
Bilateral impacted cerumen, I was able to successfully clean all the wax out with a curette and irrigation he tolerated procedures both TMs were visualized some maceration noted to the auditory canal will treat with Cortisporin for 3 days

## 2022-01-28 NOTE — PROGRESS NOTES
Assessment/Plan:    Patient Instructions     Bilateral impacted cerumen, I was able to successfully clean all the wax out with a curette and irrigation he tolerated procedures both TMs were visualized some maceration noted to the auditory canal will treat with Cortisporin for 3 days  Diagnoses and all orders for this visit:    Otitis externa of both ears, unspecified chronicity, unspecified type  -     neomycin-polymyxin-hydrocortisone (CORTISPORIN) otic solution; Instill 3 drops three times a day to both ear for 3 days  Psoriasis  -     betamethasone, augmented, (DIPROLENE) 0 05 % ointment; Apply topically 2 (two) times a day    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 ([address] )    Major depressive disorder with single episode, in full remission ([address] )    Malignant neoplasm of colon, unspecified part of colon ([address] )    Type 2 diabetes mellitus without complication, without long-term current use of insulin ([address] )    Other specified disorders involving the immune mechanism, not elsewhere classified ([address] )         Subjective:      Patient ID: Jamal Enriquez is a 74 y o  male      Patient states that he has wax in both of his ears, he went to go get hearing aids and they were not able to do the hearing aids because of the wax  Current Outpatient Medications:     aspirin 81 MG tablet, Take 81 mg by mouth daily  , Disp: , Rfl:     betamethasone, augmented, (DIPROLENE) 0 05 % ointment, Apply topically 2 (two) times a day, Disp: 50 g, Rfl: 0    calcipotriene (DOVONOX) 0 005 % ointment, Apply topically 2 (two) times a day, Disp: 60 g, Rfl: 3    enalapril (VASOTEC) 5 mg tablet, TAKE 1/2 TABLET BY MOUTH ONCE DAILY, Disp: 30 tablet, Rfl: 0    fenofibrate (TRICOR) 145 mg tablet, TAKE ONE TABLET BY MOUTH EVERY DAY, Disp: 90 tablet, Rfl: 3    fluticasone (FLONASE) 50 mcg/act nasal spray, USE 2 SPRAYS IN EACH NOSTRIL ONCE A DAY, Disp: 9 9 mL, Rfl: 0    levocetirizine (XYZAL) 5 MG tablet, Take 5 mg by mouth daily , Disp: , Rfl:     metoprolol succinate (TOPROL-XL) 25 mg 24 hr tablet, Take 1 tablet (25 mg total) by mouth daily, Disp: 90 tablet, Rfl: 3    nitroglycerin (NITROSTAT) 0 4 mg SL tablet, Place 0 4 mg under the tongue every 5 (five) minutes as needed for chest pain, Disp: , Rfl:     omeprazole (PriLOSEC) 20 mg delayed release capsule, TAKE ONE CAPSULE BY MOUTH EVERY DAY, Disp: 30 capsule, Rfl: 2    PARoxetine (PAXIL) 30 mg tablet, TAKE ONE TABLET BY MOUTH EVERY DAY (Patient taking differently: every other day  ), Disp: 30 tablet, Rfl: 5    simvastatin (ZOCOR) 40 mg tablet, TAKE ONE TABLET BY MOUTH DAILY AT BEDTIME, Disp: 90 tablet, Rfl: 3    neomycin-polymyxin-hydrocortisone (CORTISPORIN) otic solution, Instill 3 drops three times a day to both ear for 3 days  , Disp: 10 mL, Rfl: 0    No results found for this or any previous visit (from the past 1008 hour(s))  The following portions of the patient's history were reviewed and updated as appropriate: allergies, current medications, past family history, past medical history, past social history, past surgical history and problem list      Review of Systems   Constitutional: Negative for appetite change, chills, diaphoresis, fatigue, fever and unexpected weight change  HENT: Positive for ear pain  Negative for postnasal drip and sneezing  Eyes: Negative for visual disturbance  Respiratory: Negative for chest tightness and shortness of breath  Cardiovascular: Negative for chest pain, palpitations and leg swelling  Gastrointestinal: Negative for abdominal pain and blood in stool  Endocrine: Negative for cold intolerance, heat intolerance, polydipsia, polyphagia and polyuria  Genitourinary: Negative for difficulty urinating, dysuria, frequency and urgency  Musculoskeletal: Negative for arthralgias and myalgias  Skin: Negative for rash and wound  Neurological: Negative for dizziness, weakness, light-headedness and headaches  Hematological: Negative for adenopathy  Psychiatric/Behavioral: Negative for confusion, dysphoric mood and sleep disturbance  The patient is not nervous/anxious  Objective:      /70   Pulse 74   Temp 98 °F (36 7 °C)   Ht 5' 10" (1 778 m)   Wt 93 9 kg (207 lb)   SpO2 96%   BMI 29 70 kg/m²        Physical Exam  Constitutional:       Appearance: He is well-developed  HENT:      Right Ear: There is impacted cerumen  Left Ear: There is impacted cerumen  Pulmonary:      Effort: Pulmonary effort is normal    Musculoskeletal:      Cervical back: Normal range of motion  Neurological:      Mental Status: He is alert  Psychiatric:         Behavior: Behavior is cooperative

## 2022-01-29 LAB
EST. AVERAGE GLUCOSE BLD GHB EST-MCNC: 117 MG/DL
HBA1C MFR BLD: 5.7 %

## 2022-01-31 ENCOUNTER — TELEPHONE (OUTPATIENT)
Dept: INTERNAL MEDICINE CLINIC | Facility: CLINIC | Age: 72
End: 2022-01-31

## 2022-01-31 NOTE — TELEPHONE ENCOUNTER
----- Message from Ju Bill [place] sent at 1/31/2022 12:18 PM EST -----  Labs are ok except sugar number is a little higher, places him I a prediabetic range  Watch the starches and sugars  Cancer number is still undetectable   So that is good   Recheck labs again in 6 months

## 2022-02-13 DIAGNOSIS — K21.9 GASTROESOPHAGEAL REFLUX DISEASE WITHOUT ESOPHAGITIS: ICD-10-CM

## 2022-02-13 RX ORDER — OMEPRAZOLE 20 MG/1
CAPSULE, DELAYED RELEASE ORAL
Qty: 30 CAPSULE | Refills: 2 | Status: SHIPPED | OUTPATIENT
Start: 2022-02-13

## 2022-02-27 DIAGNOSIS — I10 HYPERTENSION, UNSPECIFIED TYPE: ICD-10-CM

## 2022-02-27 RX ORDER — ENALAPRIL MALEATE 5 MG/1
TABLET ORAL
Qty: 30 TABLET | Refills: 0 | Status: SHIPPED | OUTPATIENT
Start: 2022-02-27 | End: 2022-04-25

## 2022-03-13 DIAGNOSIS — F32.A DEPRESSION, UNSPECIFIED DEPRESSION TYPE: ICD-10-CM

## 2022-03-13 RX ORDER — PAROXETINE 30 MG/1
TABLET, FILM COATED ORAL
Qty: 30 TABLET | Refills: 5 | Status: SHIPPED | OUTPATIENT
Start: 2022-03-13

## 2022-03-17 ENCOUNTER — OFFICE VISIT (OUTPATIENT)
Dept: DERMATOLOGY | Facility: CLINIC | Age: 72
End: 2022-03-17
Payer: MEDICARE

## 2022-03-17 VITALS — WEIGHT: 205.8 LBS | BODY MASS INDEX: 29.53 KG/M2 | TEMPERATURE: 96.9 F

## 2022-03-17 DIAGNOSIS — Z13.89 SCREENING FOR SKIN CONDITION: ICD-10-CM

## 2022-03-17 DIAGNOSIS — L40.9 PSORIASIS: Primary | ICD-10-CM

## 2022-03-17 PROCEDURE — 99214 OFFICE O/P EST MOD 30 MIN: CPT | Performed by: DERMATOLOGY

## 2022-03-17 RX ORDER — BETAMETHASONE DIPROPIONATE 0.5 MG/G
OINTMENT TOPICAL 2 TIMES DAILY
Qty: 50 G | Refills: 3 | Status: SHIPPED | OUTPATIENT
Start: 2022-03-17

## 2022-03-17 RX ORDER — CALCIPOTRIENE 50 UG/G
OINTMENT TOPICAL 2 TIMES DAILY
Qty: 60 G | Refills: 3 | Status: SHIPPED | OUTPATIENT
Start: 2022-03-17

## 2022-03-17 NOTE — PATIENT INSTRUCTIONS
Psoriasis advised try to get some sun exposure with the warmer weather also continue with the topical therapy if no improvement is noted over the summer patient to let us know and we will discuss other options  Screening for Dermatologic Disorders: Nothing else of concern noted on complete exam follow up in 1 year

## 2022-04-25 DIAGNOSIS — I10 HYPERTENSION, UNSPECIFIED TYPE: ICD-10-CM

## 2022-04-25 RX ORDER — ENALAPRIL MALEATE 5 MG/1
TABLET ORAL
Qty: 30 TABLET | Refills: 0 | Status: SHIPPED | OUTPATIENT
Start: 2022-04-25 | End: 2022-06-27

## 2022-05-02 DIAGNOSIS — E78.49 OTHER HYPERLIPIDEMIA: ICD-10-CM

## 2022-05-02 RX ORDER — FENOFIBRATE 145 MG/1
TABLET, COATED ORAL
Qty: 90 TABLET | Refills: 3 | Status: SHIPPED | OUTPATIENT
Start: 2022-05-02

## 2022-05-10 ENCOUNTER — TELEPHONE (OUTPATIENT)
Dept: INTERNAL MEDICINE CLINIC | Facility: CLINIC | Age: 72
End: 2022-05-10

## 2022-05-10 NOTE — TELEPHONE ENCOUNTER
iPerre Adame; [facility]; [phone]    Fax# [phone]    Pt has an appt in an hour and a half, can't remember medications he is taking, would like us to fax pt's current med list

## 2022-05-18 ENCOUNTER — OFFICE VISIT (OUTPATIENT)
Dept: CARDIOLOGY CLINIC | Facility: CLINIC | Age: 72
End: 2022-05-18
Payer: MEDICARE

## 2022-05-18 VITALS
HEART RATE: 67 BPM | SYSTOLIC BLOOD PRESSURE: 110 MMHG | WEIGHT: 205 LBS | BODY MASS INDEX: 29.35 KG/M2 | HEIGHT: 70 IN | OXYGEN SATURATION: 97 % | RESPIRATION RATE: 16 BRPM | DIASTOLIC BLOOD PRESSURE: 68 MMHG

## 2022-05-18 DIAGNOSIS — Z95.1 HX OF CABG: ICD-10-CM

## 2022-05-18 DIAGNOSIS — Z98.61 CAD S/P PERCUTANEOUS CORONARY ANGIOPLASTY: Primary | ICD-10-CM

## 2022-05-18 DIAGNOSIS — E78.2 MIXED HYPERLIPIDEMIA: ICD-10-CM

## 2022-05-18 DIAGNOSIS — I51.89 DIASTOLIC DYSFUNCTION: ICD-10-CM

## 2022-05-18 DIAGNOSIS — I10 ESSENTIAL HYPERTENSION: ICD-10-CM

## 2022-05-18 DIAGNOSIS — I25.10 CAD S/P PERCUTANEOUS CORONARY ANGIOPLASTY: Primary | ICD-10-CM

## 2022-05-18 PROCEDURE — 99214 OFFICE O/P EST MOD 30 MIN: CPT | Performed by: INTERNAL MEDICINE

## 2022-05-18 NOTE — PROGRESS NOTES
CARDIOLOGY OFFICE VISIT  [facility]  [place] [address] [place], [address] [facility], [place], [facility] Y[place]  Tel: [phone]      NAME: Lillie Martin  AGE: 74 y o  SEX: male  : 1950   MRN: [id number]      Chief Complaint:  Chief Complaint   Patient presents with    Follow-up         History of Present Illness:   Patient comes for follow up  States he is doing well from cardiac stand point and denies chest pain / pressure, SOB, palpitations, lightheadedness, syncope, swelling feet, orthopnea, PND, claudication  CAD s/p PCI in , s/p CABG x2 in  at [facility]  -  States is doing well cardiac wise with no cardiac symptoms  Taking all medications regularly  Has not used SL NTG since the last clinic visit  Essential hypertension, DD -  Has been hypertensive for many years  Taking medications regularly  Denies lightheadedness, headache, medication side effects  Mixed hyperlipidemia -  Has had hyperlipidemia for many years  Taking statin regularly along with diet control  Denies myalgia  PCP closely monitoring the blood work  Past Medical History:  Past Medical History:   Diagnosis Date    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[facility] [address]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    Allergic rhinitis     Anemia     Anxiety     Atherosclerosis     CAD (coronary artery disease)     Colitis     Depression     Diabetes mellitus (HCC)     Difficulty breathing     Gastritis     GERD (gastroesophageal reflux disease)     Hepatic cyst     Hyperlipidemia     Hypertension     IFG (impaired fasting glucose)     Myocardial infarction (HCC)     9 YEARS AGO    Psoriasis          Past Surgical History:  Past Surgical History:   Procedure Laterality Date    ANGIOPLASTY      COLON SURGERY      CORONARY ARTERY BYPASS GRAFT      ME COLONOSCOPY FLX DX W/COLLJ SPEC WHEN PFRMD N/A 2017    Procedure: COLONOSCOPY;  Surgeon: Andrea Calvin MD;  Location: [facility];   Service: Gastroenterology  MA RMVL HEATHER CTR VAD W/SUBQ PORT/ CTR/PRPH INSJ Left 5/13/2016    Procedure: REMOVAL VENOUS PORT (PORT-A-CATH);   Surgeon: Abilio Milan MD;  Location: [facility];  Service: Colorectal         Family History:  Family History   Problem Relation Age of Onset    Colon cancer Family     Diverticulosis Family         colonic    Cancer Father          Social History:  Social History     Socioeconomic History    Marital status: /Civil Union     Spouse name: None    Number of children: None    Years of education: None    Highest education level: None   Occupational History    None   Tobacco Use    Smoking status: Never Smoker    Smokeless tobacco: Never Used   Vaping Use    Vaping Use: Never used   Substance and Sexual Activity    Alcohol use: Yes     Comment: social    Drug use: No    Sexual activity: Not Currently   Other Topics Concern    None   Social History Narrative    No advance directives     Social Determinants of Health     Financial Resource Strain: Not on file   Food Insecurity: Not on file   Transportation Needs: Not on file   Physical Activity: Not on file   Stress: Not on file   Social Connections: Not on file   Intimate Partner Violence: Not on file   Housing Stability: Not on file         Active Problems:  Patient Active Problem List   Diagnosis    [facility]    CAD S/P percutaneous coronary angioplasty    Gastritis    Hypertension    Hyperlipidemia    Hx of CABG    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 )         The following portions of the patient's history were reviewed and updated as appropriate: past medical history, past surgical history, past family history,  past social history, current medications, allergies and problem list       Review of Systems:  Constitutional: Denies fever, chills  Eyes: Denies eye redness, eye discharge  ENT: Denies hearing loss, sneezing, nasal discharge, sore throat   Respiratory: Denies cough, expectoration, shortness of breath  Cardiovascular: Denies chest pain, palpitations, lower extremity swelling  Gastrointestinal: Denies abdominal pain, nausea, vomiting, diarrhea  Genito-Urinary: Denies dysuria, incontinence  Musculoskeletal: Denies back pain, joint pain, muscle pain  Neurologic: Denies lightheadedness, syncope, headache, seizures  Endocrine: Denies polydipsia, temperature intolerance  Allergy and Immunology: Denies hives, insect bite sensitivity  Hematological and Lymphatic: Denies bleeding problems, swollen glands   Psychological: Denies depression, suicidal ideation, anxiety, panic  Dermatological: Denies pruritus, rash, skin lesion changes      Vitals:  Vitals:    05/18/22 1337   BP: 110/68   Pulse: 67   Resp: 16   SpO2: 97%       Body mass index is 29 41 kg/m²  Weight (last 2 days)     Date/Time Weight    05/18/22 1337 93 (205)            Physical Examination:  General: Patient is not in acute distress  Awake, alert, oriented in time, place and person  Responding to commands  Head: Normocephalic  Atraumatic  Eyes: Both pupils normal sized, round and reactive to light  Nonicteric  ENT: Normal external ear canals  Neck: Supple  JVP not raised  Trachea central  No thyromegaly  Lungs: Bilateral bronchovascular breath sounds with no crackles or rhonchi  Chest wall: No tenderness  Cardiovascular: RRR  S1 and S2 normal  No murmur, rub or gallop  Gastrointestinal: Abdomen soft, nontender  No guarding or rigidity  Liver and spleen not palpable  Bowel sounds present  Neurologic: Patient is awake, alert, oriented in time, place and person  Responding to commands  Moving all extremities  Integumentary:  No skin rash  Lymphatic: No cervical lymphadenopathy  Back: Symmetric   No CVA tenderness  Extremities: No clubbing, cyanosis or edema      Laboratory Results:  CBC with diff:   Lab Results   Component Value Date    WBC 7 24 01/28/2022    WBC 6 1 03/16/2015    RBC 4 98 01/28/2022    RBC 4 91 03/16/2015    HGB 13 8 01/28/2022    HGB 14 7 03/16/2015    HCT 42 4 01/28/2022    HCT 42 0 03/16/2015    MCV 85 01/28/2022    MCV 85 03/16/2015    MCH 27 7 01/28/2022    MCH 29 9 03/16/2015    RDW 13 1 01/28/2022    RDW 12 0 03/16/2015     01/28/2022     03/16/2015       CMP:  Lab Results   Component Value Date    CREATININE 1 30 01/28/2022    CREATININE 1 1 03/16/2015    BUN 10 01/28/2022    BUN 12 03/16/2015     (H) 03/16/2015    K 3 7 01/28/2022    K 4 0 03/16/2015     (H) 01/28/2022     (H) 03/16/2015    CO2 30 01/28/2022    CO2 29 4 03/16/2015    GLUCOSE 104 03/16/2015    PROT 6 8 03/16/2015    ALKPHOS 42 (L) 01/28/2022    ALKPHOS 89 03/16/2015    ALT 40 01/28/2022    ALT 43 03/16/2015    AST 32 01/28/2022    AST 23 03/16/2015    BILIDIR 0 19 09/07/2018       Lab Results   Component Value Date    HGBA1C 5 7 (H) 01/28/2022    HGBA1C 5 1 03/16/2015    MG 2 0 04/01/2014    PHOS 2 4 (L) 04/01/2014       No results found for: TROPONINI, CKMB, CKTOTAL    Lipid Profile:   Lab Results   Component Value Date    CHOL 129 03/16/2015    CHOL 148 02/12/2014     Lab Results   Component Value Date    HDL 30 (L) 01/28/2022    HDL 34 (L) 02/27/2021    HDL 36 (L) 09/03/2019     Lab Results   Component Value Date    LDLCALC 58 01/28/2022    LDLCALC 73 02/27/2021    LDLCALC 81 09/03/2019     Lab Results   Component Value Date    TRIG 193 (H) 01/28/2022    TRIG 161 (H) 02/27/2021    TRIG 117 09/03/2019       Cardiac testing:   Results for orders placed during the hospital encounter of 01/28/20    Echo complete with contrast if indicated    Narrative  [facility] [address] [facility]  [phone]    Transthoracic Echocardiogram  2D, M-mode, and Color Doppler    Study date:  28-Jan-2020    Patient: Lashae Dotson  MR number: [id number]  Account number: [de-identified]  : 1950  Age: 65 years  Gender: Male  Status: Outpatient  Location: [facility]  Height: 70 in  Weight: 198 7 lb  BP: 134/ 80 mmHg    Indications: CAD  Diagnoses: I25 10 - Atherosclerotic heart disease of native coronary artery without angina pectoris    Sonographer:  Tea Oakes, [facility]  Primary Physician:  Flip Hightower MD  Referring Physician:  Alisha Lorenzo MD  Group:  [facility]  Interpreting Physician:  Alisha Lorenzo MD    SUMMARY    LEFT VENTRICLE:  Systolic function was normal  Ejection fraction was estimated to be 60 %  There were no regional wall motion abnormalities  Concentric hypertrophy was present  Features were consistent with a pseudonormal left ventricular filling pattern, with concomitant abnormal relaxation and increased filling pressure (grade 2 diastolic dysfunction)  RIGHT VENTRICLE:  The size was normal   Systolic function was normal     LEFT ATRIUM:  The atrium was mildly dilated  MITRAL VALVE:  There was mild regurgitation  TRICUSPID VALVE:  There was mild regurgitation  Pulmonary artery systolic pressure was at the upper limits of normal     PULMONIC VALVE:  There was trace regurgitation  HISTORY: PRIOR HISTORY: MI  s/p angioplasty CABG  Risk factors: hypertension, diabetes, and medication-treated hypercholesterolemia  PROCEDURE: The study was performed in the [address] [place] [address]  This was a routine study  The transthoracic approach was used  The study included complete 2D imaging, M-mode, and color Doppler  The heart rate was 66 bpm, at the  start of the study  Images were obtained from the parasternal, apical, subcostal, and suprasternal notch acoustic windows  Image quality was adequate  LEFT VENTRICLE: Size was normal  Systolic function was normal  Ejection fraction was estimated to be 60 %   There were no regional wall motion abnormalities  Concentric hypertrophy was present  No evidence of apical thrombus  DOPPLER:  Features were consistent with a pseudonormal left ventricular filling pattern, with concomitant abnormal relaxation and increased filling pressure (grade 2 diastolic dysfunction)  RIGHT VENTRICLE: The size was normal  Systolic function was normal  Wall thickness was normal     LEFT ATRIUM: The atrium was mildly dilated  RIGHT ATRIUM: Size was normal     MITRAL VALVE: Valve structure was normal  There was normal leaflet separation  DOPPLER: The transmitral velocity was within the normal range  There was no evidence for stenosis  There was mild regurgitation  AORTIC VALVE: The valve was trileaflet  Leaflets exhibited normal thickness and normal cuspal separation  DOPPLER: Transaortic velocity was within the normal range  There was no evidence for stenosis  There was no significant  regurgitation  TRICUSPID VALVE: The valve structure was normal  There was normal leaflet separation  DOPPLER: The transtricuspid velocity was within the normal range  There was no evidence for stenosis  There was mild regurgitation  Pulmonary artery  systolic pressure was at the upper limits of normal     PULMONIC VALVE: Leaflets exhibited normal thickness, no calcification, and normal cuspal separation  DOPPLER: The transpulmonic velocity was within the normal range  There was trace regurgitation  PERICARDIUM: There was no pericardial effusion  The pericardium was normal in appearance  AORTA: The root exhibited normal size  SYSTEMIC VEINS: IVC: The inferior vena cava was normal in size      SYSTEM MEASUREMENT TABLES    2D  %FS: 33 3 %  Ao Diam: 3 38 cm  EDV(Teich): 115 31 ml  EF(Teich): 61 75 %  ESV(Teich): 44 1 ml  IVSd: 1 2 cm  LA Area: 21 27 cm2  LA Diam: 4 38 cm  LVEDV MOD A4C: 136 28 ml  LVEF MOD A4C: 42 6 %  LVESV MOD A4C: 78 22 ml  LVIDd: 4 95 cm  LVIDs: 3 3 cm  LVLd A4C: 8 76 cm  LVLs A4C: 7 38 cm  LVPWd: 1 53 cm  RA Area: 16 14 cm2  RVIDd: 4 63 cm  RWT: 0 62  SV MOD A4C: 58 05 ml  SV(Teich): 71 21 ml    CW  AV Env  Ti: 277 83 ms  AV MaxP 16 mmHg  AV VTI: 23 21 cm  AV Vmax: 1 24 m/s  AV Vmean: 0 84 m/s  AV meanPG: 3 2 mmHg  TR MaxP 99 mmHg  TR Vmax: 2 87 m/s    MM  TAPSE: 1 84 cm    PW  E': 0 08 m/s  E/E': 11 27  LVOT Env  Ti: 296 86 ms  LVOT VTI: 25 26 cm  LVOT Vmax: 1 24 m/s  LVOT Vmean: 0 85 m/s  LVOT maxP 15 mmHg  LVOT meanPG: 3 37 mmHg  MV A Kevin: 0 79 m/s  MV Dec [place]: 3 08 m/s2  MV DecT: 295 52 ms  MV E Kevin: 0 91 m/s  MV E/A Ratio: 1 15  MV PHT: 85 7 ms  MVA By PHT: 2 57 cm2    [facility] Accredited [facility]chocardi[facility]    Prepared and electronically signed by    Kassie Villegas MD  Signed 2020 16:13:18      Medications:    Current Outpatient Medications:     aspirin 81 MG tablet, Take 81 mg by mouth daily  , Disp: , Rfl:     betamethasone, augmented, (DIPROLENE) 0 05 % ointment, Apply topically 2 (two) times a day To psoriasis till clear, Disp: 50 g, Rfl: 3    calcipotriene (DOVONOX) 0 005 % ointment, Apply topically 2 (two) times a day, Disp: 60 g, Rfl: 3    enalapril (VASOTEC) 5 mg tablet, TAKE ONE-HALF TABLET BY MOUTH EVERY DAY, Disp: 30 tablet, Rfl: 0    fenofibrate (TRICOR) 145 mg tablet, TAKE ONE TABLET BY MOUTH EVERY DAY, Disp: 90 tablet, Rfl: 3    fluticasone (FLONASE) 50 mcg/act nasal spray, USE 2 SPRAYS IN EACH NOSTRIL ONCE A DAY, Disp: 9 9 mL, Rfl: 0    levocetirizine (XYZAL) 5 MG tablet, Take 5 mg by mouth daily , Disp: , Rfl:     metoprolol succinate (TOPROL-XL) 25 mg 24 hr tablet, Take 1 tablet (25 mg total) by mouth daily, Disp: 90 tablet, Rfl: 3    neomycin-polymyxin-hydrocortisone (CORTISPORIN) otic solution, Instill 3 drops three times a day to both ear for 3 days  , Disp: 10 mL, Rfl: 0    nitroglycerin (NITROSTAT) 0 4 mg SL tablet, Place 0 4 mg under the tongue every 5 (five) minutes as needed for chest pain, Disp: , Rfl:     omeprazole (PriLOSEC) 20 mg delayed release capsule, TAKE ONE CAPSULE BY MOUTH EVERY DAY, Disp: 30 capsule, Rfl: 2    PARoxetine (PAXIL) 30 mg tablet, TAKE ONE TABLET BY MOUTH EVERY DAY, Disp: 30 tablet, Rfl: 5    simvastatin (ZOCOR) 40 mg tablet, TAKE ONE TABLET BY MOUTH DAILY AT BEDTIME, Disp: 90 tablet, Rfl: 3      Allergies: Allergies   Allergen Reactions    Penicillins Hives         Assessment and Plan:  1  CAD S/P percutaneous coronary angioplasty Hx of CABG  Stable  Continue aspirin, statin, beta-blocker, Ace inhibitor, p r n  SL NTG    2  Essential hypertension  BP stable  Continue current medications  Continue to monitor BP at home and call if abnormal    3  Diastolic dysfunction  Tight BP control    4  Mixed hyperlipidemia  Continue statin and diet control    Recommend aggressive risk factor modification and therapeutic lifestyle changes  Low-salt, low-calorie, low-fat, low-cholesterol diet with regular exercise and to optimize weight  I will defer the ordering and monitoring of necessity lab studies to you, but I am available and happy to review and manage any of the data at your request in the future  Discussed concepts of atherosclerosis, including signs and symptoms of cardiac disease  Previous studies were reviewed  Safety measures were reviewed  Questions were entertained and answered  Patient was advised to report any problems requiring medical attention  Follow-up with PCP and appropriate specialist and lab work as discussed  Return for follow up visit as scheduled or earlier, if needed  Thank you for allowing me to participate in the care and evaluation of your patient  Should you have any questions, please feel free to contact me        Nida Kumari MD  0/02/4522,7:56 PM

## 2022-06-27 DIAGNOSIS — I10 HYPERTENSION, UNSPECIFIED TYPE: ICD-10-CM

## 2022-06-27 RX ORDER — ENALAPRIL MALEATE 5 MG/1
TABLET ORAL
Qty: 30 TABLET | Refills: 0 | Status: SHIPPED | OUTPATIENT
Start: 2022-06-27

## 2022-07-10 DIAGNOSIS — E78.2 MIXED HYPERLIPIDEMIA: ICD-10-CM

## 2022-07-11 RX ORDER — SIMVASTATIN 40 MG
TABLET ORAL
Qty: 90 TABLET | Refills: 3 | Status: SHIPPED | OUTPATIENT
Start: 2022-07-11

## 2022-08-10 ENCOUNTER — TELEPHONE (OUTPATIENT)
Dept: INTERNAL MEDICINE CLINIC | Facility: CLINIC | Age: 72
End: 2022-08-10

## 2022-08-10 NOTE — TELEPHONE ENCOUNTER
[facility] dr did exam and he will get a copy of the exam and bring it in a copy in with next appointmen

## 2022-08-30 ENCOUNTER — APPOINTMENT (OUTPATIENT)
Dept: LAB | Facility: CLINIC | Age: 72
End: 2022-08-30
Payer: MEDICARE

## 2022-08-30 DIAGNOSIS — C18.9 MALIGNANT NEOPLASM OF COLON, UNSPECIFIED PART OF COLON (HCC): ICD-10-CM

## 2022-08-30 DIAGNOSIS — I10 HYPERTENSION, UNSPECIFIED TYPE: ICD-10-CM

## 2022-08-30 DIAGNOSIS — E11.9 TYPE 2 DIABETES MELLITUS WITHOUT COMPLICATION, WITHOUT LONG-TERM CURRENT USE OF INSULIN (HCC): ICD-10-CM

## 2022-08-30 LAB
ALBUMIN SERPL BCP-MCNC: 3.7 G/DL (ref 3.5–5)
ALP SERPL-CCNC: 42 U/L (ref 46–116)
ALT SERPL W P-5'-P-CCNC: 44 U/L (ref 12–78)
ANION GAP SERPL CALCULATED.3IONS-SCNC: 5 MMOL/L (ref 4–13)
AST SERPL W P-5'-P-CCNC: 31 U/L (ref 5–45)
BASOPHILS # BLD AUTO: 0.04 THOUSANDS/ΜL (ref 0–0.1)
BASOPHILS NFR BLD AUTO: 1 % (ref 0–1)
BILIRUB SERPL-MCNC: 0.67 MG/DL (ref 0.2–1)
BUN SERPL-MCNC: 15 MG/DL (ref 5–25)
CALCIUM SERPL-MCNC: 9.3 MG/DL (ref 8.3–10.1)
CEA SERPL-MCNC: <0.5 NG/ML (ref 0–3)
CHLORIDE SERPL-SCNC: 110 MMOL/L (ref 96–108)
CHOLEST SERPL-MCNC: 120 MG/DL
CO2 SERPL-SCNC: 29 MMOL/L (ref 21–32)
CREAT SERPL-MCNC: 1.18 MG/DL (ref 0.6–1.3)
EOSINOPHIL # BLD AUTO: 0.34 THOUSAND/ΜL (ref 0–0.61)
EOSINOPHIL NFR BLD AUTO: 7 % (ref 0–6)
ERYTHROCYTE [DISTWIDTH] IN BLOOD BY AUTOMATED COUNT: 13.6 % (ref 11.6–15.1)
EST. AVERAGE GLUCOSE BLD GHB EST-MCNC: 120 MG/DL
GFR SERPL CREATININE-BSD FRML MDRD: 60 ML/MIN/1.73SQ M
GLUCOSE P FAST SERPL-MCNC: 102 MG/DL (ref 65–99)
HBA1C MFR BLD: 5.8 %
HCT VFR BLD AUTO: 43.9 % (ref 36.5–49.3)
HDLC SERPL-MCNC: 31 MG/DL
HGB BLD-MCNC: 14.4 G/DL (ref 12–17)
IMM GRANULOCYTES # BLD AUTO: 0.02 THOUSAND/UL (ref 0–0.2)
IMM GRANULOCYTES NFR BLD AUTO: 0 % (ref 0–2)
LDLC SERPL CALC-MCNC: 65 MG/DL (ref 0–100)
LYMPHOCYTES # BLD AUTO: 1.34 THOUSANDS/ΜL (ref 0.6–4.47)
LYMPHOCYTES NFR BLD AUTO: 26 % (ref 14–44)
MCH RBC QN AUTO: 28.5 PG (ref 26.8–34.3)
MCHC RBC AUTO-ENTMCNC: 32.8 G/DL (ref 31.4–37.4)
MCV RBC AUTO: 87 FL (ref 82–98)
MONOCYTES # BLD AUTO: 0.44 THOUSAND/ΜL (ref 0.17–1.22)
MONOCYTES NFR BLD AUTO: 9 % (ref 4–12)
NEUTROPHILS # BLD AUTO: 3.01 THOUSANDS/ΜL (ref 1.85–7.62)
NEUTS SEG NFR BLD AUTO: 57 % (ref 43–75)
NONHDLC SERPL-MCNC: 89 MG/DL
NRBC BLD AUTO-RTO: 0 /100 WBCS
PLATELET # BLD AUTO: 209 THOUSANDS/UL (ref 149–390)
PMV BLD AUTO: 11.1 FL (ref 8.9–12.7)
POTASSIUM SERPL-SCNC: 4.3 MMOL/L (ref 3.5–5.3)
PROT SERPL-MCNC: 6.9 G/DL (ref 6.4–8.4)
RBC # BLD AUTO: 5.06 MILLION/UL (ref 3.88–5.62)
SODIUM SERPL-SCNC: 144 MMOL/L (ref 135–147)
TRIGL SERPL-MCNC: 120 MG/DL
WBC # BLD AUTO: 5.19 THOUSAND/UL (ref 4.31–10.16)

## 2022-08-30 PROCEDURE — 36415 COLL VENOUS BLD VENIPUNCTURE: CPT

## 2022-08-30 PROCEDURE — 82378 CARCINOEMBRYONIC ANTIGEN: CPT

## 2022-08-30 PROCEDURE — 83036 HEMOGLOBIN GLYCOSYLATED A1C: CPT

## 2022-08-30 PROCEDURE — 80061 LIPID PANEL: CPT

## 2022-08-30 PROCEDURE — 85025 COMPLETE CBC W/AUTO DIFF WBC: CPT

## 2022-08-30 PROCEDURE — 80053 COMPREHEN METABOLIC PANEL: CPT

## 2022-08-31 ENCOUNTER — OFFICE VISIT (OUTPATIENT)
Dept: INTERNAL MEDICINE CLINIC | Facility: CLINIC | Age: 72
End: 2022-08-31
Payer: MEDICARE

## 2022-08-31 ENCOUNTER — TELEPHONE (OUTPATIENT)
Dept: GASTROENTEROLOGY | Facility: CLINIC | Age: 72
End: 2022-08-31

## 2022-08-31 VITALS
TEMPERATURE: 98.2 F | SYSTOLIC BLOOD PRESSURE: 128 MMHG | WEIGHT: 202.4 LBS | RESPIRATION RATE: 16 BRPM | HEIGHT: 70 IN | HEART RATE: 60 BPM | BODY MASS INDEX: 28.98 KG/M2 | DIASTOLIC BLOOD PRESSURE: 72 MMHG | OXYGEN SATURATION: 96 %

## 2022-08-31 DIAGNOSIS — I10 PRIMARY HYPERTENSION: ICD-10-CM

## 2022-08-31 DIAGNOSIS — E11.9 TYPE 2 DIABETES MELLITUS WITHOUT COMPLICATION, WITHOUT LONG-TERM CURRENT USE OF INSULIN (HCC): Primary | ICD-10-CM

## 2022-08-31 DIAGNOSIS — F32.5 MAJOR DEPRESSIVE DISORDER WITH SINGLE EPISODE, IN FULL REMISSION (HCC): ICD-10-CM

## 2022-08-31 DIAGNOSIS — C18.9 MALIGNANT NEOPLASM OF COLON, UNSPECIFIED PART OF COLON (HCC): ICD-10-CM

## 2022-08-31 DIAGNOSIS — Z95.1 HX OF CABG: ICD-10-CM

## 2022-08-31 DIAGNOSIS — E11.9 DIABETES MELLITUS IN REMISSION (HCC): ICD-10-CM

## 2022-08-31 DIAGNOSIS — E78.2 MIXED HYPERLIPIDEMIA: ICD-10-CM

## 2022-08-31 DIAGNOSIS — Z98.61 CAD S/P PERCUTANEOUS CORONARY ANGIOPLASTY: ICD-10-CM

## 2022-08-31 DIAGNOSIS — I25.10 CAD S/P PERCUTANEOUS CORONARY ANGIOPLASTY: ICD-10-CM

## 2022-08-31 PROCEDURE — 99214 OFFICE O/P EST MOD 30 MIN: CPT | Performed by: INTERNAL MEDICINE

## 2022-08-31 NOTE — TELEPHONE ENCOUNTER
Lm for the ptn to call us back to set up his colonoscopy  Also mailed him out a recall letter  Dr Nya Heredia, we are attempting to schedule if you would like to let Dr Keila Go know

## 2022-08-31 NOTE — TELEPHONE ENCOUNTER
----- Message from Jose Carlos Jewell MA sent at 8/31/2022  2:48 PM EDT -----  Regarding: FW:    ----- Message -----  From: Ramos Stanford MD  Sent: 8/31/2022   1:58 PM EDT  To: Jose Carlos Jewell MA  Subject: FW:                                              Not sure why this was sent to me, can you please see that the patient is set up for colonoscopy?  Thank you   ----- Message -----  From: Karyn Lugo MD  Sent: 8/31/2022   1:14 PM EDT  To: Tyree Gamble MD    Overdue for colo

## 2022-08-31 NOTE — PATIENT INSTRUCTIONS
Lab data reviewed in detail and compared prior    Coronary artery disease status post CABG stable without angina following with Cardiology, continue aggressive risk factor modification as below    Hyperlipidemia-LDL at goal on statin, triglycerides normal on fib rate, can consider discontinuation of fib rate if this remains normal   HDL is low  Patient encouraged to increase aerobic exercise, consider taken 30 minute walk every day  Depression and anxiety are stable on paroxetine    History of colon cancer-CEA remains undetectable    Diabetes in remission-A1c remains stable under 6 5 without medication    Routine follow-up after labs in 6 months, sooner as needed

## 2022-08-31 NOTE — PROGRESS NOTES
Assessment/Plan:    Diagnoses and all orders for this visit:    Type 2 diabetes mellitus without complication, without long-term current use of insulin ([facility])  -     CBC and differential; Future  -     Comprehensive metabolic panel; Future  -     Lipid panel; Future  -     Hemoglobin A1C; Future  -     TSH, 3rd generation with Free T4 reflex; Future    Malignant neoplasm of colon, unspecified part of colon ([facility] )    CAD S/P percutaneous coronary angioplasty    Primary hypertension    Hx of CABG    Mixed hyperlipidemia    Major depressive disorder with single episode, in full remission ([facility] )    Diabetes mellitus in remission [facility])            Patient Instructions   Lab data reviewed in detail and compared prior    Coronary artery disease status post CABG stable without angina following with Cardiology, continue aggressive risk factor modification as below    Hyperlipidemia-LDL at goal on statin, triglycerides normal on fib rate, can consider discontinuation of fib rate if this remains normal   HDL is low  Patient encouraged to increase aerobic exercise, consider taken 30 minute walk every day  Depression and anxiety are stable on paroxetine    History of colon cancer-CEA remains undetectable    Diabetes in remission-A1c remains stable under 6 5 without medication    Routine follow-up after labs in 6 months, sooner as needed  Subjective:      Patient ID: Jaylan Samuel is a 74 y o  male    F/u mmp and review labs  Feeling generally well  Active, working at [employer] 2d per week, no regular exercise  CAD s/p CABG stable w/o angina, f/b Dr Patricia Wilder  HTN/HPL-taking rx as directed, no home bp's  IFG-not watching carbs closely  H/o colon ca-due for colonoscopy, last 2/17  Depression/anxiety-stable on paxil qod  Current Outpatient Medications:     aspirin 81 MG tablet, Take 81 mg by mouth daily  , Disp: , Rfl:     calcipotriene (DOVONOX) 0 005 % ointment, Apply topically 2 (two) times a day, Disp: 60 g, Rfl: 3    enalapril (VASOTEC) 5 mg tablet, TAKE 1/2 TABLET BY MOUTH ONCE DAILY, Disp: 30 tablet, Rfl: 0    fenofibrate (TRICOR) 145 mg tablet, TAKE ONE TABLET BY MOUTH EVERY DAY, Disp: 90 tablet, Rfl: 3    fluticasone (FLONASE) 50 mcg/act nasal spray, USE 2 SPRAYS IN EACH NOSTRIL ONCE A DAY, Disp: 9 9 mL, Rfl: 0    levocetirizine (XYZAL) 5 MG tablet, Take 5 mg by mouth daily , Disp: , Rfl:     metoprolol succinate (TOPROL-XL) 25 mg 24 hr tablet, Take 1 tablet (25 mg total) by mouth daily, Disp: 90 tablet, Rfl: 3    omeprazole (PriLOSEC) 20 mg delayed release capsule, TAKE ONE CAPSULE BY MOUTH EVERY DAY, Disp: 30 capsule, Rfl: 2    PARoxetine (PAXIL) 30 mg tablet, TAKE ONE TABLET BY MOUTH EVERY DAY, Disp: 30 tablet, Rfl: 5    simvastatin (ZOCOR) 40 mg tablet, TAKE ONE TABLET BY MOUTH EVERY DAY AT BEDTIME, Disp: 90 tablet, Rfl: 3    betamethasone, augmented, (DIPROLENE) 0 05 % ointment, Apply topically 2 (two) times a day To psoriasis till clear (Patient not taking: Reported on 8/31/2022), Disp: 50 g, Rfl: 3    neomycin-polymyxin-hydrocortisone (CORTISPORIN) otic solution, Instill 3 drops three times a day to both ear for 3 days  , Disp: 10 mL, Rfl: 0    nitroglycerin (NITROSTAT) 0 4 mg SL tablet, Place 0 4 mg under the tongue every 5 (five) minutes as needed for chest pain (Patient not taking: Reported on 8/31/2022), Disp: , Rfl:     Recent Results (from the past 1008 hour(s))   CBC and differential    Collection Time: 08/30/22 10:41 AM   Result Value Ref Range    WBC 5 19 4 31 - 10 16 Thousand/uL    RBC 5 06 3 88 - 5 62 Million/uL    Hemoglobin 14 4 12 0 - 17 0 g/dL    Hematocrit 43 9 36 5 - 49 3 %    MCV 87 82 - 98 fL    MCH 28 5 26 8 - 34 3 pg    MCHC 32 8 31 4 - 37 4 g/dL    RDW 13 6 11 6 - 15 1 %    MPV 11 1 8 9 - 12 7 fL    Platelets 838 566 - 825 Thousands/uL    nRBC 0 /100 WBCs    Neutrophils Relative 57 43 - 75 %    Immat GRANS % 0 0 - 2 %    Lymphocytes Relative 26 14 - 44 %    Monocytes Relative 9 4 - 12 %    Eosinophils Relative 7 (H) 0 - 6 %    Basophils Relative 1 0 - 1 %    Neutrophils Absolute 3 01 1 85 - 7 62 Thousands/µL    Immature Grans Absolute 0 02 0 00 - 0 20 Thousand/uL    Lymphocytes Absolute 1 34 0 60 - 4 47 Thousands/µL    Monocytes Absolute 0 44 0 17 - 1 22 Thousand/µL    Eosinophils Absolute 0 34 0 00 - 0 61 Thousand/µL    Basophils Absolute 0 04 0 00 - 0 10 Thousands/µL   Comprehensive metabolic panel    Collection Time: 08/30/22 10:41 AM   Result Value Ref Range    Sodium 144 135 - 147 mmol/L    Potassium 4 3 3 5 - 5 3 mmol/L    Chloride 110 (H) 96 - 108 mmol/L    CO2 29 21 - 32 mmol/L    ANION GAP 5 4 - 13 mmol/L    BUN 15 5 - 25 mg/dL    Creatinine 1 18 0 60 - 1 30 mg/dL    Glucose, Fasting 102 (H) 65 - 99 mg/dL    Calcium 9 3 8 3 - 10 1 mg/dL    AST 31 5 - 45 U/L    ALT 44 12 - 78 U/L    Alkaline Phosphatase 42 (L) 46 - 116 U/L    Total Protein 6 9 6 4 - 8 4 g/dL    Albumin 3 7 3 5 - 5 0 g/dL    Total Bilirubin 0 67 0 20 - 1 00 mg/dL    eGFR 60 ml/min/1 73sq m   Lipid panel    Collection Time: 08/30/22 10:41 AM   Result Value Ref Range    Cholesterol 120 See Comment mg/dL    Triglycerides 120 See Comment mg/dL    HDL, Direct 31 (L) >=40 mg/dL    LDL Calculated 65 0 - 100 mg/dL    Non-HDL-Chol (CHOL-HDL) 89 mg/dl   Hemoglobin A1C    Collection Time: 08/30/22 10:41 AM   Result Value Ref Range    Hemoglobin A1C 5 8 (H) Normal 3 8-5 6%; PreDiabetic 5 7-6 4%;  Diabetic >=6 5%; Glycemic control for adults with diabetes <7 0% %     mg/dl   CEA    Collection Time: 08/30/22 10:41 AM   Result Value Ref Range    CEA <0 5 0 0 - 3 0 ng/mL       The following portions of the patient's history were reviewed and updated as appropriate: allergies, current medications, past family history, past medical history, past social history, past surgical history and problem list      Review of Systems   Constitutional: Negative for appetite change, chills, diaphoresis, fatigue, fever and unexpected weight change  HENT: Negative for congestion, hearing loss and rhinorrhea  Eyes: Negative for visual disturbance  Respiratory: Negative for cough, chest tightness, shortness of breath and wheezing  Cardiovascular: Negative for chest pain, palpitations and leg swelling  Gastrointestinal: Negative for abdominal pain and blood in stool  Endocrine: Negative for cold intolerance, heat intolerance, polydipsia and polyuria  Genitourinary: Negative for difficulty urinating, dysuria, frequency and urgency  Musculoskeletal: Negative for arthralgias and myalgias  Skin: Negative for rash  Neurological: Negative for dizziness, weakness, light-headedness and headaches  Hematological: Does not bruise/bleed easily  Psychiatric/Behavioral: Negative for dysphoric mood and sleep disturbance  Objective:      Vitals:    08/31/22 1257   BP: 128/72   Pulse: 60   Resp: 16   Temp: 98 2 °F (36 8 °C)   SpO2: 96%          Physical Exam  Constitutional:       Appearance: He is well-developed  HENT:      Head: Normocephalic and atraumatic  Nose: Nose normal    Eyes:      General: No scleral icterus  Conjunctiva/sclera: Conjunctivae normal       Pupils: Pupils are equal, round, and reactive to light  Neck:      Thyroid: No thyromegaly  Vascular: No JVD  Trachea: No tracheal deviation  Cardiovascular:      Rate and Rhythm: Normal rate and regular rhythm  Heart sounds: Murmur heard  No friction rub  No gallop  Pulmonary:      Effort: Pulmonary effort is normal  No respiratory distress  Breath sounds: Normal breath sounds  No wheezing or rales  Musculoskeletal:         General: No deformity  Cervical back: Normal range of motion and neck supple  Lymphadenopathy:      Cervical: No cervical adenopathy  Skin:     General: Skin is warm and dry  Coloration: Skin is not pale  Findings: No erythema or rash     Neurological:      Mental Status: He is alert and oriented to person, place, and time  Cranial Nerves: No cranial nerve deficit  Psychiatric:         Behavior: Behavior normal          Thought Content:  Thought content normal          Judgment: Judgment normal

## 2022-09-08 DIAGNOSIS — I10 ESSENTIAL HYPERTENSION: ICD-10-CM

## 2022-09-08 RX ORDER — METOPROLOL SUCCINATE 25 MG/1
TABLET, EXTENDED RELEASE ORAL
Qty: 90 TABLET | Refills: 3 | Status: SHIPPED | OUTPATIENT
Start: 2022-09-08

## 2022-09-11 DIAGNOSIS — F32.A DEPRESSION, UNSPECIFIED DEPRESSION TYPE: ICD-10-CM

## 2022-09-11 RX ORDER — PAROXETINE 30 MG/1
TABLET, FILM COATED ORAL
Qty: 30 TABLET | Refills: 5 | Status: SHIPPED | OUTPATIENT
Start: 2022-09-11

## 2022-09-23 ENCOUNTER — TELEPHONE (OUTPATIENT)
Dept: INTERNAL MEDICINE CLINIC | Facility: CLINIC | Age: 72
End: 2022-09-23

## 2022-09-23 ENCOUNTER — OFFICE VISIT (OUTPATIENT)
Dept: INTERNAL MEDICINE CLINIC | Facility: CLINIC | Age: 72
End: 2022-09-23
Payer: MEDICARE

## 2022-09-23 VITALS
WEIGHT: 202 LBS | TEMPERATURE: 98.1 F | SYSTOLIC BLOOD PRESSURE: 140 MMHG | DIASTOLIC BLOOD PRESSURE: 78 MMHG | HEART RATE: 71 BPM | BODY MASS INDEX: 28.92 KG/M2 | HEIGHT: 70 IN | OXYGEN SATURATION: 96 % | RESPIRATION RATE: 17 BRPM

## 2022-09-23 DIAGNOSIS — J32.9 SINUSITIS, UNSPECIFIED CHRONICITY, UNSPECIFIED LOCATION: Primary | ICD-10-CM

## 2022-09-23 PROCEDURE — 99214 OFFICE O/P EST MOD 30 MIN: CPT | Performed by: NURSE PRACTITIONER

## 2022-09-23 RX ORDER — PROMETHAZINE HYDROCHLORIDE AND CODEINE PHOSPHATE 6.25; 1 MG/5ML; MG/5ML
5 SYRUP ORAL EVERY 4 HOURS PRN
Qty: 120 ML | Refills: 0 | Status: SHIPPED | OUTPATIENT
Start: 2022-09-23

## 2022-09-23 RX ORDER — DOXYCYCLINE HYCLATE 100 MG
100 TABLET ORAL 2 TIMES DAILY
Qty: 14 TABLET | Refills: 0 | Status: SHIPPED | OUTPATIENT
Start: 2022-09-23 | End: 2022-09-30

## 2022-09-23 RX ORDER — PREDNISONE 10 MG/1
TABLET ORAL
Qty: 18 TABLET | Refills: 0 | Status: SHIPPED | OUTPATIENT
Start: 2022-09-23 | End: 2022-10-20

## 2022-09-23 NOTE — PROGRESS NOTES
INTERNAL MEDICINE FOLLOW-UP VISIT  [facility] - [facility] [address] [place] [address]    NAME: Michael Mcguire  AGE: 74 y o  SEX: male  : 1950     DATE: 2022     Assessment and Plan:   1  Sinusitis, unspecified chronicity, unspecified location  - predniSONE 10 mg tablet; Take 3 tablets for 3 days then 2 tablets for 3 days then 1 tablet for 3 days  Dispense: 18 tablet; Refill: 0  - doxycycline hyclate (VIBRA-TABS) 100 mg tablet; Take 1 tablet (100 mg total) by mouth 2 (two) times a day for 7 days  Dispense: 14 tablet; Refill: 0  - promethazine-codeine (PHENERGAN WITH CODEINE) 6 25-10 mg/5 mL syrup; Take 5 mL by mouth every 4 (four) hours as needed for cough  Dispense: 120 mL; Refill: 0    BMI Counseling: Body mass index is 28 98 kg/m²  The BMI is above normal  Nutrition recommendations include decreasing portion sizes and decreasing fast food intake  Exercise recommendations include exercising 3-5 times per week  No pharmacotherapy was ordered  Rationale for BMI follow-up plan is due to patient being overweight or obese  No follow-ups on file  Chief Complaint:     Chief Complaint   Patient presents with    Follow-up     Congested and sore throat since Tuesday  Covid 19 home test negative today  History of Present Illness:   hasnt felt well since tues  Admits last week he was at [facility]  He has had sore throat, sinus pressure, cough   No otcs  He took covid today and it was negative    The following portions of the patient's history were reviewed and updated as appropriate: allergies, current medications, past family history, past medical history, past social history, past surgical history and problem list      Review of Systems:     Review of Systems   Constitutional: Negative for appetite change, chills, diaphoresis, fatigue, fever and unexpected weight change  HENT: Positive for facial swelling, postnasal drip, sinus pain and sore throat   Negative for sneezing  Eyes: Negative for visual disturbance  Respiratory: Positive for cough  Negative for chest tightness and shortness of breath  Cardiovascular: Negative for chest pain, palpitations and leg swelling  Gastrointestinal: Negative for abdominal pain and blood in stool  Endocrine: Negative for cold intolerance, heat intolerance, polydipsia, polyphagia and polyuria  Genitourinary: Negative for difficulty urinating, dysuria, frequency and urgency  Musculoskeletal: Negative for arthralgias and myalgias  Skin: Negative for rash and wound  Neurological: Negative for dizziness, weakness, light-headedness and headaches  Hematological: Negative for adenopathy  Psychiatric/Behavioral: Negative for confusion, dysphoric mood and sleep disturbance  The patient is not nervous/anxious  Diabetic Foot Exam    Patient's shoes and socks removed  Right Foot/Ankle   Right Foot Inspection  Skin Exam: skin normal and skin intact  No dry skin, no warmth, no callus, no erythema, no maceration, no abnormal color, no pre-ulcer, no ulcer and no callus  Toe Exam: ROM and strength within normal limits  Sensory   Vibration: intact  Proprioception: intact  Monofilament testing: intact    Vascular  Capillary refills: < 3 seconds  The right DP pulse is 2+  The right PT pulse is 2+  Left Foot/Ankle  Left Foot Inspection  Skin Exam: skin normal and skin intact  No dry skin, no warmth, no erythema, no maceration, normal color, no pre-ulcer, no ulcer and no callus  Toe Exam: ROM and strength within normal limits  Sensory   Vibration: intact  Proprioception: intact  Monofilament testing: intact    Vascular  Capillary refills: < 3 seconds  The left DP pulse is 2+  The left PT pulse is 2+       Assign Risk Category  No deformity present  No loss of protective sensation  No weak pulses  Risk: 1       Past Medical History:     Past Medical History:   Diagnosis Date    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    Allergic rhinitis     Anemia     Anxiety     Atherosclerosis     CAD (coronary artery disease)     Colitis     Depression     Diabetes mellitus (HCC)     Difficulty breathing     Gastritis     GERD (gastroesophageal reflux disease)     Hepatic cyst     Hyperlipidemia     Hypertension     IFG (impaired fasting glucose)     Myocardial infarction (HCC)     9 YEARS AGO    Psoriasis         Current Medications:     Current Outpatient Medications:     aspirin 81 MG tablet, Take 81 mg by mouth daily  , Disp: , Rfl:     calcipotriene (DOVONOX) 0 005 % ointment, Apply topically 2 (two) times a day, Disp: 60 g, Rfl: 3    doxycycline hyclate (VIBRA-TABS) 100 mg tablet, Take 1 tablet (100 mg total) by mouth 2 (two) times a day for 7 days, Disp: 14 tablet, Rfl: 0    enalapril (VASOTEC) 5 mg tablet, TAKE 1/2 TABLET BY MOUTH ONCE DAILY, Disp: 30 tablet, Rfl: 0    fluticasone (FLONASE) 50 mcg/act nasal spray, USE 2 SPRAYS IN EACH NOSTRIL ONCE A DAY, Disp: 9 9 mL, Rfl: 0    levocetirizine (XYZAL) 5 MG tablet, Take 5 mg by mouth daily , Disp: , Rfl:     metoprolol succinate (TOPROL-XL) 25 mg 24 hr tablet, TAKE ONE TABLET BY MOUTH EVERY DAY, Disp: 90 tablet, Rfl: 3    omeprazole (PriLOSEC) 20 mg delayed release capsule, TAKE ONE CAPSULE BY MOUTH EVERY DAY, Disp: 30 capsule, Rfl: 2    PARoxetine (PAXIL) 30 mg tablet, TAKE ONE TABLET BY MOUTH EVERY DAY (Patient taking differently: Patient taking every other day 09/23/22), Disp: 30 tablet, Rfl: 5    predniSONE 10 mg tablet, Take 3 tablets for 3 days then 2 tablets for 3 days then 1 tablet for 3 days, Disp: 18 tablet, Rfl: 0    promethazine-codeine (PHENERGAN WITH CODEINE) 6 25-10 mg/5 mL syrup, Take 5 mL by mouth every 4 (four) hours as needed for cough, Disp: 120 mL, Rfl: 0    simvastatin (ZOCOR) 40 mg tablet, TAKE ONE TABLET BY MOUTH EVERY DAY AT BEDTIME, Disp: 90 tablet, Rfl: 3    betamethasone, augmented, (DIPROLENE) 0 05 % ointment, Apply topically 2 (two) times a day To psoriasis till clear (Patient not taking: No sig reported), Disp: 50 g, Rfl: 3    fenofibrate (TRICOR) 145 mg tablet, TAKE ONE TABLET BY MOUTH EVERY DAY (Patient not taking: Reported on 9/23/2022), Disp: 90 tablet, Rfl: 3    neomycin-polymyxin-hydrocortisone (CORTISPORIN) otic solution, Instill 3 drops three times a day to both ear for 3 days  (Patient not taking: Reported on 9/23/2022), Disp: 10 mL, Rfl: 0    nitroglycerin (NITROSTAT) 0 4 mg SL tablet, Place 0 4 mg under the tongue every 5 (five) minutes as needed for chest pain (Patient not taking: Reported on 8/31/2022), Disp: , Rfl:      Allergies: Allergies   Allergen Reactions    Penicillins Hives        Physical Exam:     /78 (BP Location: Left arm, Patient Position: Sitting, Cuff Size: Large)   Pulse 71   Temp 98 1 °F (36 7 °C) (Temporal)   Resp 17   Ht 5' 10" (1 778 m)   Wt 91 6 kg (202 lb) Comment: with sheos on  SpO2 96%   BMI 28 98 kg/m²     Physical Exam  Constitutional:       Appearance: He is well-developed  HENT:      Head: Normocephalic and atraumatic  Eyes:      Conjunctiva/sclera: Conjunctivae normal       Pupils: Pupils are equal, round, and reactive to light  Cardiovascular:      Rate and Rhythm: Normal rate and regular rhythm  Pulses: no weak pulses          Dorsalis pedis pulses are 2+ on the right side and 2+ on the left side  Posterior tibial pulses are 2+ on the right side and 2+ on the left side  Heart sounds: Murmur heard  Pulmonary:      Effort: Pulmonary effort is normal       Breath sounds: Rhonchi present  Abdominal:      General: Bowel sounds are normal       Palpations: Abdomen is soft  Musculoskeletal:         General: Normal range of motion  Cervical back: Normal range of motion  Feet:      Right foot:      Skin integrity: No ulcer, skin breakdown, erythema, warmth, callus or dry skin        Left foot:      Skin integrity: No ulcer, skin breakdown, erythema, warmth, callus or dry skin  Skin:     General: Skin is warm and dry  Neurological:      Mental Status: He is alert and oriented to person, place, and time             Data:       Coit [place], CRNP  [facility]

## 2022-09-23 NOTE — TELEPHONE ENCOUNTER
Patient has appt today and wanted to know if medication was sent to pharm  Don't see a script sent today       Please advise: [phone]

## 2022-09-26 ENCOUNTER — TELEPHONE (OUTPATIENT)
Dept: INTERNAL MEDICINE CLINIC | Facility: CLINIC | Age: 72
End: 2022-09-26

## 2022-10-14 ENCOUNTER — TELEPHONE (OUTPATIENT)
Dept: INTERNAL MEDICINE CLINIC | Facility: CLINIC | Age: 72
End: 2022-10-14

## 2022-10-14 NOTE — TELEPHONE ENCOUNTER
Patient was seen on 9-23 and is still not feeling any better  Is there any thing that could be called in for him? He is having a hard time breathing with the congestion    You can reach him at [phone]

## 2022-10-14 NOTE — TELEPHONE ENCOUNTER
He was treated with antibiotics and steroids, he needs to be seen    I do not know what else to add right now

## 2022-10-20 ENCOUNTER — OFFICE VISIT (OUTPATIENT)
Dept: INTERNAL MEDICINE CLINIC | Facility: CLINIC | Age: 72
End: 2022-10-20
Payer: MEDICARE

## 2022-10-20 VITALS
HEART RATE: 69 BPM | WEIGHT: 205.4 LBS | TEMPERATURE: 98.4 F | OXYGEN SATURATION: 95 % | SYSTOLIC BLOOD PRESSURE: 110 MMHG | HEIGHT: 70 IN | BODY MASS INDEX: 29.41 KG/M2 | DIASTOLIC BLOOD PRESSURE: 70 MMHG

## 2022-10-20 DIAGNOSIS — R09.82 POST-NASAL DRIP: Primary | ICD-10-CM

## 2022-10-20 PROCEDURE — 99212 OFFICE O/P EST SF 10 MIN: CPT | Performed by: INTERNAL MEDICINE

## 2022-10-20 NOTE — PROGRESS NOTES
Assessment/Plan:    1  Postnasal drip  -recently diagnosed sinusitis treated with antibiotic doxycycline and prednisone, completed course of treatment  -currently having postnasal drip, dry cough especially before sleep  -also noted to have eye discharge w/o any scratching sensation or redness noted  -noted to have daily edematous turbinates on Lt nasal cavity  -does not have H/o any seasonal allergies however does take Xyzal every day  -did not try Flonase at home but does have prescription  Recommended to use Flonase nasal spray at bedtime daily  Call us back on Monday with any worsening symptoms or symptoms not controlled     Problem List Items Addressed This Visit    None     Visit Diagnoses     Post-nasal drip    -  Primary            Subjective:      Patient ID: Guillermo Paulino is a 74 y o  male  Pt here with complain of persistent cough  Was recently diagnosed with sinusitis completed course of antibiotic with doxycycline and prednisone  Denies any fever, chills, chest pain, SOB, difficulty swelling, voice changes  Does complain of eye discharge w/o any itchiness or redness noted  Does not have any H/o seasonal allergies  Does have Flonase at home however did not take it  No nasal discharge  No ear pain or fullness or discharge  No tinnitus  No hearing loss  Just complaining of headache on persistent coughing  The following portions of the patient's history were reviewed and updated as appropriate:       Review of Systems:    Review of Systems   Constitutional: Negative for activity change, appetite change, chills, diaphoresis, fatigue, fever and unexpected weight change  HENT: Positive for postnasal drip  Negative for congestion, drooling, ear discharge, ear pain, hearing loss, rhinorrhea, sinus pressure, sinus pain, sore throat, tinnitus, trouble swallowing and voice change  Eyes: Negative for visual disturbance     Respiratory: Negative for cough, chest tightness and shortness of breath  Cardiovascular: Negative for chest pain, palpitations and leg swelling  Gastrointestinal: Negative for abdominal distention, abdominal pain, blood in stool, constipation, diarrhea, nausea and vomiting  Genitourinary: Negative for difficulty urinating  Musculoskeletal: Negative for arthralgias  Neurological: Positive for headaches  Negative for dizziness, light-headedness and numbness  Psychiatric/Behavioral: Negative for behavioral problems and sleep disturbance  Past Medical and Surgical History:     Past Medical History:   Diagnosis Date   • 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   • Allergic rhinitis    • Anemia    • Anxiety    • Atherosclerosis    • CAD (coronary artery disease)    • Colitis    • Depression    • Diabetes mellitus ([place] [address] )    • Difficulty breathing    • Gastritis    • GERD (gastroesophageal reflux disease)    • Hepatic cyst    • Hyperlipidemia    • Hypertension    • IFG (impaired fasting glucose)    • Myocardial infarction ([place] [address] )     9 YEARS AGO   • Psoriasis        Past Surgical History:   Procedure Laterality Date   • ANGIOPLASTY     • COLON SURGERY     • CORONARY ARTERY BYPASS GRAFT     • [place] COLONOSCOPY FLX DX W/COLLJ SPEC WHEN PFRMD N/A 2/21/2017    Procedure: COLONOSCOPY;  Surgeon: Chato Palacios MD;  Location: [facility]; Service: Gastroenterology   • [place] RMVL HEATHER CTR VAD W/SUBQ PORT/ CTR/PRPH INSJ Left 5/13/2016    Procedure: REMOVAL VENOUS PORT (PORT-A-CATH);   Surgeon: Kendrick Ahumada, MD;  Location:  MAIN OR;  Service: Colorectal         Family History:    Family History   Problem Relation Age of Onset   • Colon cancer Family    • Diverticulosis Family         colonic   • Cancer Father           Social History:    Substance Use History:   Social History     Substance and Sexual Activity   Alcohol Use Yes    Comment: social     Social History     Tobacco Use   Smoking Status Never Smoker   Smokeless Tobacco Never Used     Social History     Substance and Sexual Activity   Drug Use No         Meds/Allergies:    Prior to Admission medications    Medication Sig Start Date End Date Taking? Authorizing Provider   aspirin 81 MG tablet Take 81 mg by mouth daily     Yes Historical Provider, MD   calcipotriene (DOVONOX) 0 005 % ointment Apply topically 2 (two) times a day 3/17/22  Yes Digna Ashford MD   enalapril (VASOTEC) 5 mg tablet TAKE 1/2 TABLET BY MOUTH ONCE DAILY 8/28/22  Yes YONATAN Tam   levocetirizine (XYZAL) 5 MG tablet Take 5 mg by mouth daily  7/22/20  Yes Historical Provider, MD   metoprolol succinate (TOPROL-XL) 25 mg 24 hr tablet TAKE ONE TABLET BY MOUTH EVERY DAY 9/8/22  Yes Anupama Bradford MD   nitroglycerin (NITROSTAT) 0 4 mg SL tablet Place 0 4 mg under the tongue every 5 (five) minutes as needed for chest pain   Yes Historical Provider, MD   omeprazole (PriLOSEC) 20 mg delayed release capsule TAKE ONE CAPSULE BY MOUTH EVERY DAY 2/13/22  Yes YONATAN Tam   PARoxetine (PAXIL) 30 mg tablet TAKE ONE TABLET BY MOUTH EVERY DAY  Patient taking differently: Patient taking every other day 09/23/22 9/11/22  Yes YONATAN Tam   promethazine-codeine (PHENERGAN WITH CODEINE) 6 25-10 mg/5 mL syrup Take 5 mL by mouth every 4 (four) hours as needed for cough 9/23/22  Yes YONATAN Tam   simvastatin (ZOCOR) 40 mg tablet TAKE ONE TABLET BY MOUTH EVERY DAY AT BEDTIME 7/11/22  Yes Anupama Bradford MD   betamethasone, augmented, (DIPROLENE) 0 05 % ointment Apply topically 2 (two) times a day To psoriasis till clear  Patient not taking: No sig reported 3/17/22   Digna Ashford MD   fenofibrate (TRICOR) 145 mg tablet TAKE ONE TABLET BY MOUTH EVERY DAY  Patient not taking: No sig reported 5/2/22   Anupama Bradford MD   fluticasone (FLONASE) 50 mcg/act nasal spray USE 2 SPRAYS IN EACH NOSTRIL ONCE A DAY  Patient not taking: Reported on 10/20/2022 8/31/21   YONATAN Tam   neomycin-polymyxin-hydrocortisone (CORTISPORIN) otic solution Instill 3 drops three times a day to both ear for 3 days  Patient not taking: No sig reported 1/28/22   Jazmyne Jobs, CRJOSÉ   predniSONE 10 mg tablet Take 3 tablets for 3 days then 2 tablets for 3 days then 1 tablet for 3 days  Patient not taking: Reported on 10/20/2022 9/23/22   Jazmyne Ordaz, CRNP       Allergies: Allergies   Allergen Reactions   • Penicillins Hives       Objective:  Vitals:    10/20/22 1500   BP: 110/70   Pulse: 69   Temp: 98 4 °F (36 9 °C)   SpO2: 95%   Weight: 93 2 kg (205 lb 6 4 oz)   Height: 5' 10" (1 778 m)     Body mass index is 29 47 kg/m²  Physical Exam  Vitals reviewed  Constitutional:       General: He is not in acute distress  Appearance: Normal appearance  He is well-developed  HENT:      Head: Normocephalic and atraumatic  Nose: Nose normal  No nasal deformity, septal deviation, signs of injury, laceration, nasal tenderness, congestion or rhinorrhea  Right Nostril: No foreign body, epistaxis or occlusion  Left Nostril: No foreign body, epistaxis or occlusion  Right Turbinates: Not swollen or pale  Left Turbinates: Swollen and pale  Right Sinus: No maxillary sinus tenderness or frontal sinus tenderness  Left Sinus: No maxillary sinus tenderness or frontal sinus tenderness  Mouth/Throat:      Mouth: Mucous membranes are moist  Mucous membranes are pale  Dentition: Normal dentition  Does not have dentures  No gingival swelling  Tongue: No lesions  Tongue does not deviate from midline  Pharynx: Oropharynx is clear  No oropharyngeal exudate or posterior oropharyngeal erythema  Eyes:      General: No scleral icterus  Right eye: No discharge  Left eye: No discharge  Cardiovascular:      Rate and Rhythm: Normal rate and regular rhythm  Pulses: Normal pulses  Heart sounds: Normal heart sounds  Pulmonary:      Effort: Pulmonary effort is normal       Breath sounds: Normal breath sounds     Neurological: Mental Status: He is alert and oriented to person, place, and time  Mental status is at baseline     Psychiatric:         Mood and Affect: Mood normal          Behavior: Behavior normal

## 2022-10-27 DIAGNOSIS — I10 HYPERTENSION, UNSPECIFIED TYPE: ICD-10-CM

## 2022-10-27 RX ORDER — ENALAPRIL MALEATE 5 MG/1
TABLET ORAL
Qty: 30 TABLET | Refills: 0 | Status: SHIPPED | OUTPATIENT
Start: 2022-10-27

## 2022-10-28 ENCOUNTER — TELEPHONE (OUTPATIENT)
Dept: INTERNAL MEDICINE CLINIC | Facility: CLINIC | Age: 72
End: 2022-10-28

## 2022-10-28 DIAGNOSIS — R09.82 POST-NASAL DRIP: Primary | ICD-10-CM

## 2022-10-28 RX ORDER — MONTELUKAST SODIUM 10 MG/1
10 TABLET ORAL
Qty: 30 TABLET | Refills: 3 | Status: SHIPPED | OUTPATIENT
Start: 2022-10-28 | End: 2023-02-22

## 2022-11-02 ENCOUNTER — APPOINTMENT (OUTPATIENT)
Dept: RADIOLOGY | Facility: CLINIC | Age: 72
End: 2022-11-02

## 2022-11-02 DIAGNOSIS — R09.82 POST-NASAL DRIP: ICD-10-CM

## 2022-11-04 ENCOUNTER — TELEPHONE (OUTPATIENT)
Dept: INTERNAL MEDICINE CLINIC | Facility: CLINIC | Age: 72
End: 2022-11-04

## 2022-11-05 DIAGNOSIS — K21.9 GASTROESOPHAGEAL REFLUX DISEASE WITHOUT ESOPHAGITIS: ICD-10-CM

## 2022-11-05 RX ORDER — OMEPRAZOLE 20 MG/1
CAPSULE, DELAYED RELEASE ORAL
Qty: 30 CAPSULE | Refills: 2 | Status: SHIPPED | OUTPATIENT
Start: 2022-11-05

## 2022-12-27 DIAGNOSIS — I10 HYPERTENSION, UNSPECIFIED TYPE: ICD-10-CM

## 2022-12-27 RX ORDER — ENALAPRIL MALEATE 5 MG/1
TABLET ORAL
Qty: 30 TABLET | Refills: 0 | Status: SHIPPED | OUTPATIENT
Start: 2022-12-27

## 2023-01-24 DIAGNOSIS — K21.9 GASTROESOPHAGEAL REFLUX DISEASE WITHOUT ESOPHAGITIS: ICD-10-CM

## 2023-01-24 RX ORDER — OMEPRAZOLE 20 MG/1
CAPSULE, DELAYED RELEASE ORAL
Qty: 30 CAPSULE | Refills: 2 | Status: SHIPPED | OUTPATIENT
Start: 2023-01-24

## 2023-01-25 ENCOUNTER — OFFICE VISIT (OUTPATIENT)
Dept: CARDIOLOGY CLINIC | Facility: CLINIC | Age: 73
End: 2023-01-25

## 2023-01-25 VITALS
HEART RATE: 57 BPM | HEIGHT: 70 IN | DIASTOLIC BLOOD PRESSURE: 90 MMHG | SYSTOLIC BLOOD PRESSURE: 164 MMHG | WEIGHT: 206 LBS | RESPIRATION RATE: 16 BRPM | OXYGEN SATURATION: 98 % | BODY MASS INDEX: 29.49 KG/M2

## 2023-01-25 DIAGNOSIS — E78.2 MIXED HYPERLIPIDEMIA: ICD-10-CM

## 2023-01-25 DIAGNOSIS — I51.89 DIASTOLIC DYSFUNCTION: ICD-10-CM

## 2023-01-25 DIAGNOSIS — Z95.1 HX OF CABG: ICD-10-CM

## 2023-01-25 DIAGNOSIS — Z98.61 CAD S/P PERCUTANEOUS CORONARY ANGIOPLASTY: Primary | ICD-10-CM

## 2023-01-25 DIAGNOSIS — I25.10 CAD S/P PERCUTANEOUS CORONARY ANGIOPLASTY: Primary | ICD-10-CM

## 2023-01-25 DIAGNOSIS — I10 ESSENTIAL HYPERTENSION: ICD-10-CM

## 2023-01-25 RX ORDER — ATORVASTATIN CALCIUM 40 MG/1
40 TABLET, FILM COATED ORAL DAILY
Qty: 90 TABLET | Refills: 3 | Status: SHIPPED | OUTPATIENT
Start: 2023-01-25

## 2023-01-25 RX ORDER — ENALAPRIL MALEATE 5 MG/1
5 TABLET ORAL DAILY
Qty: 90 TABLET | Refills: 2 | Status: SHIPPED | OUTPATIENT
Start: 2023-01-25

## 2023-01-25 NOTE — PROGRESS NOTES
CARDIOLOGY OFFICE VISIT  [facility]  [address], [place], [facility] [place]  Tel: [phone]      NAME: Andrew Gamble  AGE: 74 y o  SEX: male  : 1950  MRN: [id number]      Chief Complaint:  Chief Complaint   Patient presents with   • Follow-up         History of Present Illness:   Patient comes for follow up  States he is doing well from cardiac stand point and denies chest pain / pressure, SOB, palpitations, lightheadedness, syncope, swelling feet, orthopnea, PND, claudication  CAD s/p PCI in , s/p CABG x2 in  at [facility]-  States is doing well cardiac wise with no cardiac symptoms  Taking all medications regularly  Has not used SL NTG since the last clinic visit  Essential hypertension, diastolic dysfunction -  Has been hypertensive for many years  Taking medications regularly  Denies lightheadedness, headache, medication side effects  Mixed hyperlipidemia -  Has had hyperlipidemia for many years  Taking statin regularly along with diet control  Denies myalgia  PCP closely monitoring the blood work  Past Medical History:  Past Medical History:   Diagnosis Date   • 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[facility]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   • Allergic rhinitis    • Anemia    • Anxiety    • Atherosclerosis    • CAD (coronary artery disease)    • Colitis    • Depression    • Diabetes mellitus ([address] )    • Difficulty breathing    • Gastritis    • GERD (gastroesophageal reflux disease)    • Hepatic cyst    • Hyperlipidemia    • Hypertension    • IFG (impaired fasting glucose)    • Myocardial infarction ([facility] )     9 YEARS AGO   • Psoriasis          Past Surgical History:  Past Surgical History:   Procedure Laterality Date   • ANGIOPLASTY     • COLON SURGERY     • CORONARY ARTERY BYPASS GRAFT     • [place] COLONOSCOPY FLX DX W/COLLJ SPEC WHEN PFRMD N/A 2017    Procedure: COLONOSCOPY;  Surgeon: Prateek Reyes MD;  Location: [facility];   Service: Gastroenterology   • [place] RMVL HEATHER CTR VAD W/SUBQ PORT/ CTR/PRPH INSJ Left 5/13/2016    Procedure: REMOVAL VENOUS PORT (PORT-A-CATH); Surgeon: Lawson Toledo MD;  [facility];  Service: Colorectal         Family History:  Family History   Problem Relation Age of Onset   • Colon cancer Family    • Diverticulosis Family         colonic   • Cancer Father          Social History:  Social History     Socioeconomic History   • Marital status: /Civil Union     Spouse name: None   • Number of children: None   • Years of education: None   • Highest education level: None   Occupational History   • None   Tobacco Use   • Smoking status: Never   • Smokeless tobacco: Never   Vaping Use   • Vaping Use: Never used   Substance and Sexual Activity   • Alcohol use: Yes     Comment: social   • Drug use: No   • Sexual activity: Not Currently     Partners: Female   Other Topics Concern   • None   Social History Narrative    No advance directives     Social Determinants of Health     Financial Resource Strain: Not on file   Food Insecurity: Not on file   Transportation Needs: Not on file   Physical Activity: Unknown   • Days of Exercise per Week: 0 days   • Minutes of Exercise per Session: Not on file   Stress: No Stress Concern Present   • Feeling of Stress :  Only a little   Social Connections: Not on file   Intimate Partner Violence: Not on file   Housing Stability: Not on file         Active Problems:  Patient Active Problem List   Diagnosis   • Colon cancer ([facility] )   • CAD S/P percutaneous coronary angioplasty   • Gastritis   • Hypertension   • Hyperlipidemia   • Hx of CABG   • 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 ([facility] )   • Major depressive disorder with single episode, in full remission ([facility] )   • Diabetes mellitus in remission ([facility] )         The following portions of the patient's history were reviewed and updated as appropriate: past medical history, past surgical history, past family history, past social history, current medications, allergies and problem list       Review of Systems:  Constitutional: Denies fever, chills  Eyes: Denies eye redness, eye discharge  ENT: Denies hearing loss, sneezing, nasal discharge, sore throat   Respiratory: Denies cough, expectoration, shortness of breath  Cardiovascular: Denies chest pain, palpitations, lower extremity swelling  Gastrointestinal: Denies abdominal pain, nausea, vomiting, diarrhea  Genito-Urinary: Denies dysuria, incontinence  Musculoskeletal: Denies back pain, joint pain, muscle pain  Neurologic: Denies lightheadedness, syncope, headache, seizures  Endocrine: Denies polydipsia, temperature intolerance  Allergy and Immunology: Denies hives, insect bite sensitivity  Hematological and Lymphatic: Denies bleeding problems, swollen glands   Psychological: Denies depression, suicidal ideation, anxiety, panic  Dermatological: Denies pruritus, rash, skin lesion changes      Vitals:  Vitals:    01/25/23 1037   BP: 164/90   Pulse: 57   Resp: 16   SpO2: 98%       Body mass index is 29 56 kg/m²  Weight (last 2 days)     Date/Time Weight    01/25/23 1037 93 4 (206)            Physical Examination:  General: Patient is not in acute distress  Awake, alert, oriented in time, place and person  Responding to commands  Head: Normocephalic  Atraumatic  Eyes: Both pupils normal sized, round and reactive to light  Nonicteric  ENT: Normal external ear canals  Neck: Supple  JVP not raised  Trachea central  No thyromegaly  Lungs: Bilateral bronchovascular breath sounds with no crackles or rhonchi  Chest wall: No tenderness  Cardiovascular: RRR  S1 and S2 normal  No murmur, rub or gallop  Gastrointestinal: Abdomen soft, nontender  No guarding or rigidity  Liver and spleen not palpable  Bowel sounds present  Neurologic: Patient is awake, alert, oriented in time, place and person  Responding to commands   Moving all extremities  Integumentary:  No skin rash  Lymphatic: No cervical lymphadenopathy  Back: Symmetric  No CVA tenderness  Extremities: No clubbing, cyanosis or edema      Laboratory Results:  CBC with diff:   Lab Results   Component Value Date    WBC 5 19 08/30/2022    WBC 6 1 03/16/2015    RBC 5 06 08/30/2022    RBC 4 91 03/16/2015    HGB 14 4 08/30/2022    HGB 14 7 03/16/2015    HCT 43 9 08/30/2022    HCT 42 0 03/16/2015    MCV 87 08/30/2022    MCV 85 03/16/2015    MCH 28 5 08/30/2022    MCH 29 9 03/16/2015    RDW 13 6 08/30/2022    RDW 12 0 03/16/2015     08/30/2022     03/16/2015       CMP:  Lab Results   Component Value Date    CREATININE 1 18 08/30/2022    CREATININE 1 1 03/16/2015    BUN 15 08/30/2022    BUN 12 03/16/2015     (H) 03/16/2015    K 4 3 08/30/2022    K 4 0 03/16/2015     (H) 08/30/2022     (H) 03/16/2015    CO2 29 08/30/2022    CO2 29 4 03/16/2015    GLUCOSE 104 03/16/2015    PROT 6 8 03/16/2015    ALKPHOS 42 (L) 08/30/2022    ALKPHOS 89 03/16/2015    ALT 44 08/30/2022    ALT 43 03/16/2015    AST 31 08/30/2022    AST 23 03/16/2015    BILIDIR 0 19 09/07/2018       Lab Results   Component Value Date    HGBA1C 5 8 (H) 08/30/2022    HGBA1C 5 1 03/16/2015    MG 2 0 04/01/2014    PHOS 2 4 (L) 04/01/2014         Medications:    Current Outpatient Medications:   •  aspirin 81 MG tablet, Take 81 mg by mouth daily  , Disp: , Rfl:   •  atorvastatin (LIPITOR) 40 mg tablet, Take 1 tablet (40 mg total) by mouth daily, Disp: 90 tablet, Rfl: 3  •  calcipotriene (DOVONOX) 0 005 % ointment, Apply topically 2 (two) times a day, Disp: 60 g, Rfl: 3  •  enalapril (VASOTEC) 5 mg tablet, Take 1 tablet (5 mg total) by mouth daily, Disp: 90 tablet, Rfl: 2  •  levocetirizine (XYZAL) 5 MG tablet, Take 5 mg by mouth daily , Disp: , Rfl:   •  metoprolol succinate (TOPROL-XL) 25 mg 24 hr tablet, TAKE ONE TABLET BY MOUTH EVERY DAY, Disp: 90 tablet, Rfl: 3  •  montelukast (SINGULAIR) 10 mg tablet, Take 1 tablet (10 mg total) by mouth daily at bedtime, Disp: 30 tablet, Rfl: 3  •  nitroglycerin (NITROSTAT) 0 4 mg SL tablet, Place 0 4 mg under the tongue every 5 (five) minutes as needed for chest pain, Disp: , Rfl:   •  omeprazole (PriLOSEC) 20 mg delayed release capsule, TAKE ONE CAPSULE BY MOUTH EVERY DAY, Disp: 30 capsule, Rfl: 2  •  PARoxetine (PAXIL) 30 mg tablet, TAKE ONE TABLET BY MOUTH EVERY DAY (Patient taking differently: Patient taking every other day 09/23/22), Disp: 30 tablet, Rfl: 5  •  betamethasone, augmented, (DIPROLENE) 0 05 % ointment, Apply topically 2 (two) times a day To psoriasis till clear (Patient not taking: Reported on 8/31/2022), Disp: 50 g, Rfl: 3      Allergies: Allergies   Allergen Reactions   • Penicillins Hives         Assessment and Plan:  1  CAD S/P percutaneous coronary angioplasty  Hx of CABG  Doing well  Continue aspirin, statin, beta-blocker, ACE inhibitor, as needed SL NTG    3  Essential hypertension  BP high  Increase enalapril from 2 5 mg to 5 mg daily  Continue metoprolol the same  Come in next week for BP check    4  Mixed hyperlipidemia  Change simvastatin to atorvastatin  Continue diet control    5  Diastolic dysfunction  Tight BP control    Recommend aggressive risk factor modification and therapeutic lifestyle changes  Low-salt, low-calorie, low-fat, low-cholesterol diet with regular exercise and to optimize weight  I will defer the ordering and monitoring of necessity lab studies to you, but I am available and happy to review and manage any of the data at your request in the future  Discussed concepts of atherosclerosis, including signs and symptoms of cardiac disease  Previous studies were reviewed  Safety measures were reviewed  Questions were entertained and answered  Patient was advised to report any problems requiring medical attention  Follow-up with PCP and appropriate specialist and lab work as discussed  Return for follow up visit as scheduled or earlier, if needed    Thank you for allowing me to participate in the care and evaluation of your patient  Should you have any questions, please feel free to contact me        Noel Krishna MD  1/25/2023,11:47 AM

## 2023-02-02 ENCOUNTER — CLINICAL SUPPORT (OUTPATIENT)
Dept: CARDIOLOGY CLINIC | Facility: CLINIC | Age: 73
End: 2023-02-02

## 2023-02-02 VITALS
HEART RATE: 50 BPM | HEIGHT: 70 IN | RESPIRATION RATE: 16 BRPM | OXYGEN SATURATION: 98 % | SYSTOLIC BLOOD PRESSURE: 142 MMHG | BODY MASS INDEX: 29.78 KG/M2 | DIASTOLIC BLOOD PRESSURE: 80 MMHG | WEIGHT: 208 LBS

## 2023-02-02 DIAGNOSIS — I10 PRIMARY HYPERTENSION: Primary | ICD-10-CM

## 2023-02-02 NOTE — PROGRESS NOTES
Patient seen in office for BP check  BP elevated during office visit with Dr Alejandro Mcwilliams on 1/25  Dr Alejandro Mcwilliams increased patient's enalapril from 25 5 mg to 5 mg  Patient's BP today is 142/80  Discussed pt with Dr Kimber Gaffney    Patient advised to continue enalapril at current dose of 5 mg and to eliminate salt from his diet per Dr Kimber Gaffney      Patient verbalized understanding

## 2023-02-21 ENCOUNTER — RA CDI HCC (OUTPATIENT)
Dept: OTHER | Facility: HOSPITAL | Age: 73
End: 2023-02-21

## 2023-02-21 NOTE — PROGRESS NOTES
[address]  coding opportunities       Chart reviewed, no opportunity found: CHART REVIEWED, NO OPPORTUNITY FOUND        Patients Insurance     Medicare Insurance: Medicare

## 2023-02-22 DIAGNOSIS — R09.82 POST-NASAL DRIP: ICD-10-CM

## 2023-02-22 RX ORDER — MONTELUKAST SODIUM 10 MG/1
TABLET ORAL
Qty: 30 TABLET | Refills: 3 | Status: SHIPPED | OUTPATIENT
Start: 2023-02-22

## 2023-04-26 DIAGNOSIS — K21.9 GASTROESOPHAGEAL REFLUX DISEASE WITHOUT ESOPHAGITIS: ICD-10-CM

## 2023-04-26 RX ORDER — OMEPRAZOLE 20 MG/1
20 CAPSULE, DELAYED RELEASE ORAL DAILY
Qty: 30 CAPSULE | Refills: 2 | Status: SHIPPED | OUTPATIENT
Start: 2023-04-26

## 2023-05-03 ENCOUNTER — OFFICE VISIT (OUTPATIENT)
Age: 73
End: 2023-05-03

## 2023-05-03 VITALS — BODY MASS INDEX: 29.78 KG/M2 | HEIGHT: 70 IN | WEIGHT: 208 LBS

## 2023-05-03 DIAGNOSIS — L40.9 PSORIASIS: Primary | ICD-10-CM

## 2023-05-03 DIAGNOSIS — Z13.89 SCREENING FOR SKIN CONDITION: ICD-10-CM

## 2023-05-03 RX ORDER — CALCIPOTRIENE 50 UG/G
OINTMENT TOPICAL 2 TIMES DAILY
Qty: 60 G | Refills: 3 | Status: SHIPPED | OUTPATIENT
Start: 2023-05-03

## 2023-05-03 RX ORDER — BETAMETHASONE DIPROPIONATE 0.5 MG/G
OINTMENT TOPICAL 2 TIMES DAILY
Qty: 50 G | Refills: 3 | Status: SHIPPED | OUTPATIENT
Start: 2023-05-03

## 2023-05-03 NOTE — PATIENT INSTRUCTIONS
Psoriasis still active we discussed options with his history of colon cancer and reluctant to consider immunosuppressants at this time patient also does drink alcohol and with his history previously also concern regarding use of methotrexate  Also with history of depression be concerned about use of Otezla    We discussed the use of phototherapy at this point however patient is to think about it because of travel to the office twice a week go ahead and refill his medication at this point and patient to be seen in a year or to call us if he wishes to go ahead with phototherapy

## 2023-05-03 NOTE — PROGRESS NOTES
[address] [facility] [address]  [phone]  [phone]     MRN: [id number] : 1950  Encounter: [id number]  Patient Information: Zheng Olivera  Chief complaint: yearly check up    History of present illness: 40-year-old male with history of psoriasis presents for follow-up  Patient continues to have problems with his psoriasis seems to be expanding rapidly as topicals a while ago does not feel that the sun really helps this process  No other concerns noted  Past Medical History:   Diagnosis Date    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    Allergic rhinitis     Anemia     Anxiety     Atherosclerosis     CAD (coronary artery disease)     Colitis     Depression     Diabetes mellitus (HCC)     Difficulty breathing     Gastritis     GERD (gastroesophageal reflux disease)     Hepatic cyst     Hyperlipidemia     Hypertension     IFG (impaired fasting glucose)     Myocardial infarction (HCC)     9 YEARS AGO    Psoriasis      Past Surgical History:   Procedure Laterality Date    ANGIOPLASTY      COLON SURGERY      CORONARY ARTERY BYPASS GRAFT      [place] COLONOSCOPY FLX DX W/COLLJ SPEC WHEN PFRMD N/A 2017    Procedure: COLONOSCOPY;  Surgeon: Marlene Hopkins MD;  [facility]; Service: Gastroenterology    [place] RMVL HEATHER CTR VAD W/SUBQ PORT/ CTR/PRPH INSJ Left 2016    Procedure: REMOVAL VENOUS PORT (PORT-A-CATH);   Surgeon: Robi Kraft MD;  Location:  MAIN OR;  Service: Colorectal     Social History   Social History     Substance and Sexual Activity   Alcohol Use Yes    Comment: social     Social History     Substance and Sexual Activity   Drug Use No     Social History     Tobacco Use   Smoking Status Never   Smokeless Tobacco Never     Family History   Problem Relation Age of Onset    Colon cancer Family     Diverticulosis Family         colonic    Cancer Father      Meds/Allergies   Allergies   Allergen Reactions  Penicillins Hives       Meds:  Prior to Admission medications    Medication Sig Start Date End Date Taking? Authorizing Provider   aspirin 81 MG tablet Take 81 mg by mouth daily     Yes Historical Provider, MD   atorvastatin (LIPITOR) 40 mg tablet Take 1 tablet (40 mg total) by mouth daily 1/25/23  Yes Trupti Leonard MD   betamethasone, augmented, (DIPROLENE) 0 05 % ointment Apply topically 2 (two) times a day To psoriasis till clear 3/17/22  Yes Karo Ivey MD   calcipotriene (DOVONOX) 0 005 % ointment Apply topically 2 (two) times a day 3/17/22  Yes Karo Ivey MD   enalapril (VASOTEC) 5 mg tablet Take 1 tablet (5 mg total) by mouth daily 1/25/23  Yes Trupti Leonard MD   levocetirizine (XYZAL) 5 MG tablet Take 5 mg by mouth daily  7/22/20  Yes Historical Provider, MD   metoprolol succinate (TOPROL-XL) 25 mg 24 hr tablet TAKE ONE TABLET BY MOUTH EVERY DAY 9/8/22  Yes Trupti Leonard MD   montelukast (SINGULAIR) 10 mg tablet TAKE ONE TABLET BY MOUTH EVERY DAY AT BEDTIME 2/22/23  Yes YONATAN Newman   nitroglycerin (NITROSTAT) 0 4 mg SL tablet Place 0 4 mg under the tongue every 5 (five) minutes as needed for chest pain   Yes Historical Provider, MD   omeprazole (PriLOSEC) 20 mg delayed release capsule Take 1 capsule (20 mg total) by mouth daily 4/26/23  Yes David Bronson MD   PARoxetine (PAXIL) 30 mg tablet [address] DAY  Patient taking differently: Patient taking every other day 09/23/22 9/11/22  Yes YONATAN Newman       Subjective:     Review of Systems:    General: negative for - chills, fatigue, fever,  weight gain or weight loss  Psychological: negative for - anxiety, behavioral disorder, concentration difficulties, decreased libido, depression, irritability, memory difficulties, mood swings, sleep disturbances or suicidal ideation  ENT: negative for - hearing difficulties , nasal congestion, nasal discharge, oral lesions, sinus pain, sneezing, sore throat  Allergy and "Immunology: negative for - hives, insect bite sensitivity,  Hematological and Lymphatic: negative for - bleeding problems, blood clots,bruising, swollen lymph nodes  Endocrine: negative for - hair pattern changes, hot flashes, malaise/lethargy, mood swings, palpitations, polydipsia/polyuria, skin changes, temperature intolerance or unexpected weight change  Respiratory: negative for - cough, hemoptysis, orthopnea, shortness of breath, or wheezing  Cardiovascular: negative for - chest pain, dyspnea on exertion, edema,  Gastrointestinal: negative for - abdominal pain, nausea/vomiting  Genito-Urinary: negative for - dysuria, incontinence, irregular/heavy menses or urinary frequency/urgency  Musculoskeletal: negative for - gait disturbance, joint pain, joint stiffness, joint swelling, muscle pain, muscular weakness  Dermatological:  As in HPI  Neurological: negative for confusion, dizziness, headaches, impaired coordination/balance, memory loss, numbness/tingling, seizures, speech problems, tremors or weakness       Objective:   Ht 5' 10\" (1 778 m)   Wt 94 3 kg (208 lb)   BMI 29 84 kg/m²     Physical Exam:    General Appearance:    Alert, cooperative, no distress   Head:    Normocephalic, without obvious abnormality, atraumatic           Skin:   A full skin exam was performed including scalp, head scalp, eyes, ears, nose, lips, neck, chest, axilla, abdomen, back, buttocks, bilateral upper extremities, bilateral lower extremities, hands, feet, fingers, toes, fingernails, and toenails erythema scaling well-demarcated plaques involving 10% of body surface area especially in flanks buttocks right elbow and knees       Assessment:     1  Psoriasis  betamethasone, augmented, (DIPROLENE) 0 05 % ointment    calcipotriene (DOVONOX) 0 005 % ointment      2   Screening for skin condition              Plan:   Psoriasis still active we discussed options with his history of colon cancer and reluctant to consider immunosuppressants " "at this time patient also does drink alcohol and with his history previously also concern regarding use of methotrexate  Also with history of depression be concerned about use of Otezla  We discussed the use of phototherapy at this point however patient is to think about it because of travel to the office twice a week go ahead and refill his medication at this point and patient to be seen in a year or to call us if he wishes to go ahead with phototherapy    Jacqui Ray MD  5/3/2023,12:26 PM    Portions of the record may have been created with voice recognition software   Occasional wrong word or \"sound a like\" substitutions may have occurred due to the inherent limitations of voice recognition software   Read the chart carefully and recognize, using context, where substitutions have occurred    "

## 2023-05-03 NOTE — PROGRESS NOTES
"[facility]     Patient Name: Sri Woodward  Encounter Date: May 3, 2023     Have you been cared for by a [place] [address] Dermatologist in the last 3 years and, if so, which description applies to you? Yes  I have been here within the last 3 years, and my medical history has NOT changed since that time  I am MALE/not capable of bearing children  REVIEW OF SYSTEMS:  Have you recently had or currently have any of the following? · No changes in my recent health  PAST MEDICAL HISTORY:  Have you personally ever had or currently have any of the following? If \"YES,\" then please provide more detail  · No changes in my medical history  FAMILY HISTORY:  Any \"first degree relatives\" (parent, brother, sister, or child) with the following?  No changes in my family's known health  PATIENT EXPERIENCE:     Do you want the Dermatologist to perform a COMPLETE skin exam today including a clinical examination under the \"bra and underwear\" areas? Yes   If necessary, do we have your permission to call and leave a detailed message on your Preferred Phone number that includes your specific medical information? Yes      Allergies   Allergen Reactions    Penicillins Hives      Current Outpatient Medications:     aspirin 81 MG tablet, Take 81 mg by mouth daily  , Disp: , Rfl:     atorvastatin (LIPITOR) 40 mg tablet, Take 1 tablet (40 mg total) by mouth daily, Disp: 90 tablet, Rfl: 3    betamethasone, augmented, (DIPROLENE) 0 05 % ointment, Apply topically 2 (two) times a day To psoriasis till clear, Disp: 50 g, Rfl: 3    calcipotriene (DOVONOX) 0 005 % ointment, Apply topically 2 (two) times a day, Disp: 60 g, Rfl: 3    enalapril (VASOTEC) 5 mg tablet, Take 1 tablet (5 mg total) by mouth daily, Disp: 90 tablet, Rfl: 2    levocetirizine (XYZAL) 5 MG tablet, Take 5 mg by mouth daily , Disp: , Rfl:     metoprolol succinate (TOPROL-XL) 25 mg 24 hr tablet, TAKE ONE TABLET BY MOUTH EVERY DAY, Disp: 90 " tablet, Rfl: 3    montelukast (SINGULAIR) 10 mg tablet, TAKE ONE TABLET BY MOUTH EVERY DAY AT BEDTIME, Disp: 30 tablet, Rfl: 3    nitroglycerin (NITROSTAT) 0 4 mg SL tablet, Place 0 4 mg under the tongue every 5 (five) minutes as needed for chest pain, Disp: , Rfl:     omeprazole (PriLOSEC) 20 mg delayed release capsule, Take 1 capsule (20 mg total) by mouth daily, Disp: 30 capsule, Rfl: 2    PARoxetine (PAXIL) 30 mg tablet, TAKE ONE TABLET BY MOUTH EVERY DAY (Patient taking differently: Patient taking every other day 09/23/22), Disp: 30 tablet, Rfl: 5           Whom besides the patient is providing clinical information about today's encounter?   o NO ADDITIONAL HISTORIAN (patient alone provided history)    Physical Exam and Assessment/Plan by Diagnosis:

## 2023-09-06 ENCOUNTER — OFFICE VISIT (OUTPATIENT)
Dept: CARDIOLOGY CLINIC | Facility: CLINIC | Age: 73
End: 2023-09-06
Payer: MEDICARE

## 2023-09-06 VITALS
OXYGEN SATURATION: 98 % | HEART RATE: 55 BPM | RESPIRATION RATE: 16 BRPM | WEIGHT: 207 LBS | DIASTOLIC BLOOD PRESSURE: 80 MMHG | SYSTOLIC BLOOD PRESSURE: 148 MMHG | HEIGHT: 70 IN | BODY MASS INDEX: 29.63 KG/M2

## 2023-09-06 DIAGNOSIS — Z95.1 HX OF CABG: ICD-10-CM

## 2023-09-06 DIAGNOSIS — I25.10 CAD S/P PERCUTANEOUS CORONARY ANGIOPLASTY: Primary | ICD-10-CM

## 2023-09-06 DIAGNOSIS — I10 ESSENTIAL HYPERTENSION: ICD-10-CM

## 2023-09-06 DIAGNOSIS — Z98.61 CAD S/P PERCUTANEOUS CORONARY ANGIOPLASTY: Primary | ICD-10-CM

## 2023-09-06 DIAGNOSIS — I51.89 DIASTOLIC DYSFUNCTION: ICD-10-CM

## 2023-09-06 DIAGNOSIS — E78.2 MIXED HYPERLIPIDEMIA: ICD-10-CM

## 2023-09-06 PROCEDURE — 99214 OFFICE O/P EST MOD 30 MIN: CPT | Performed by: INTERNAL MEDICINE

## 2023-09-06 RX ORDER — ENALAPRIL MALEATE 10 MG/1
10 TABLET ORAL DAILY
Qty: 90 TABLET | Refills: 2 | Status: SHIPPED | OUTPATIENT
Start: 2023-09-06 | End: 2023-09-12 | Stop reason: SDUPTHER

## 2023-09-06 NOTE — PROGRESS NOTES
CARDIOLOGY OFFICE VISIT  [facility]  [address], [facility], [facility] [address], [address] [place] [address]  Tel: [phone]      NAME: Yvrose Membreno  AGE: 74 y.o. SEX: male  : 1950  MRN: [id number]      Chief Complaint:  Chief Complaint   Patient presents with   • Follow-up         History of Present Illness:   Patient comes for follow up. States he is doing well from cardiac stand point and denies chest pain / pressure, SOB, palpitations, lightheadedness, syncope, swelling feet, orthopnea, PND, claudication. CAD s/p PCI in , s/p CABG x2 in  at [facility] -  States is doing well cardiac wise with no cardiac symptoms. Taking all medications regularly. Has not used SL NTG since the last clinic visit. Essential hypertension, DD -  Has been hypertensive for many years. Taking medications regularly. Denies lightheadedness, headache, medication side effects. Mixed hyperlipidemia -  Has had hyperlipidemia for many years. Taking statin regularly along with diet control. Denies myalgia. PCP closely monitoring the blood work. Past Medical History:  Past Medical History:   Diagnosis Date   • 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   • Allergic rhinitis    • Anemia    • Anxiety    • Atherosclerosis    • CAD (coronary artery disease)    • Colitis    • Depression    • Diabetes mellitus ([address])    • Difficulty breathing    • Gastritis    • GERD (gastroesophageal reflux disease)    • Hepatic cyst    • Hyperlipidemia    • Hypertension    • IFG (impaired fasting glucose)    • Myocardial infarction ([address])     9 YEARS AGO   • Psoriasis          Past Surgical History:  Past Surgical History:   Procedure Laterality Date   • ANGIOPLASTY     • COLON SURGERY     • CORONARY ARTERY BYPASS GRAFT     • [place] COLONOSCOPY FLX DX W/COLLJ SPEC WHEN PFRMD N/A 2017    Procedure: COLONOSCOPY;  Surgeon: Frederick Valencia MD;  Location: [facility];   Service: Gastroenterology • [place] RMVL HEATHER CTR VAD W/SUBQ PORT/ CTR/PRPH INSJ Left 5/13/2016    Procedure: REMOVAL VENOUS PORT (PORT-A-CATH); Surgeon: Jena Hurtado MD;  [facility];  Service: Colorectal         Family History:  Family History   Problem Relation Age of Onset   • Colon cancer Family    • Diverticulosis Family         colonic   • Cancer Father          Social History:  Social History     Socioeconomic History   • Marital status: /Civil Union     Spouse name: None   • Number of children: None   • Years of education: None   • Highest education level: None   Occupational History   • None   Tobacco Use   • Smoking status: Never   • Smokeless tobacco: Never   Vaping Use   • Vaping Use: Never used   Substance and Sexual Activity   • Alcohol use: Yes     Comment: social   • Drug use: No   • Sexual activity: Not Currently     Partners: Female   Other Topics Concern   • None   Social History Narrative    No advance directives     Social Determinants of Health     Financial Resource Strain: Not on file   Food Insecurity: Not on file   Transportation Needs: Not on file   Physical Activity: Unknown (8/31/2022)    Exercise Vital Sign    • Days of Exercise per Week: 0 days    • Minutes of Exercise per Session: Not on file   Stress: No Stress Concern Present (9/23/2022)    [facility]    • Feeling of Stress :  Only a little   Social Connections: Not on file   Intimate Partner Violence: Not on file   Housing Stability: Not on file         Active Problems:  Patient Active Problem List   Diagnosis   • Colon cancer ([address])   • CAD S/P percutaneous coronary angioplasty   • Gastritis   • Hypertension   • Hyperlipidemia   • Hx of CABG   • 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      Review of Systems:  Constitutional: Denies fever, chills  Eyes: Denies eye redness, eye discharge  ENT: Denies hearing loss, sneezing, nasal discharge, sore throat   Respiratory: Denies cough, expectoration, shortness of breath  Cardiovascular: Denies chest pain, palpitations, lower extremity swelling  Gastrointestinal: Denies abdominal pain, nausea, vomiting, diarrhea  Genito-Urinary: Denies dysuria, incontinence  Musculoskeletal: Denies back pain, joint pain, muscle pain  Neurologic: Denies lightheadedness, syncope, headache, seizures  Endocrine: Denies polydipsia, temperature intolerance  Allergy and Immunology: Denies hives, insect bite sensitivity  Hematological and Lymphatic: Denies bleeding problems, swollen glands   Psychological: Denies depression, suicidal ideation, anxiety, panic  Dermatological: Denies pruritus, rash, skin lesion changes      Vitals:  Vitals:    09/06/23 1118   BP: 148/80   Pulse: 55   Resp: 16   SpO2: 98%       Body mass index is 29.7 kg/m². Weight (last 2 days)     Date/Time Weight    09/06/23 1118 93.9 (207)            Physical Examination:  General: Patient is not in acute distress. Awake, alert, oriented in time, place and person. Responding to commands  Head: Normocephalic. Atraumatic  Eyes: Both pupils normal sized, round and reactive to light. Nonicteric  ENT: Normal external ear canals  Neck: Supple. JVP not raised. Trachea central. No thyromegaly  Lungs: Bilateral bronchovascular breath sounds with no crackles or rhonchi  Chest wall: No tenderness  Cardiovascular: RRR. S1 and S2 normal. No murmur, rub or gallop  Gastrointestinal: Abdomen soft, nontender. No guarding or rigidity. Liver and spleen not palpable.  Bowel sounds present  Neurologic: Patient is awake, alert, oriented in time, place and person. Responding to commands. Moving all extremities  Integumentary:  No skin rash  Lymphatic: No cervical lymphadenopathy  Back: Symmetric. No CVA tenderness  Extremities: No clubbing, cyanosis or edema      Laboratory Results:  CBC with diff:   Lab Results   Component Value Date    WBC 5.19 08/30/2022    WBC 6.1 03/16/2015    RBC 5.06 08/30/2022    RBC 4.91 03/16/2015    HGB 14.4 08/30/2022    HGB 14.7 03/16/2015    HCT 43.9 08/30/2022    HCT 42.0 03/16/2015    MCV 87 08/30/2022    MCV 85 03/16/2015    MCH 28.5 08/30/2022    MCH 29.9 03/16/2015    RDW 13.6 08/30/2022    RDW 12.0 03/16/2015     08/30/2022     03/16/2015       CMP:  Lab Results   Component Value Date    CREATININE 1.18 08/30/2022    CREATININE 1.1 03/16/2015    BUN 15 08/30/2022    BUN 12 03/16/2015     (H) 03/16/2015    K 4.3 08/30/2022    K 4.0 03/16/2015     (H) 08/30/2022     (H) 03/16/2015    CO2 29 08/30/2022    CO2 29.4 03/16/2015    GLUCOSE 104 03/16/2015    PROT 6.8 03/16/2015    ALKPHOS 42 (L) 08/30/2022    ALKPHOS 89 03/16/2015    ALT 44 08/30/2022    ALT 43 03/16/2015    AST 31 08/30/2022    AST 23 03/16/2015    BILIDIR 0.19 09/07/2018       Lab Results   Component Value Date    HGBA1C 5.8 (H) 08/30/2022    HGBA1C 5.1 03/16/2015    MG 2.0 04/01/2014    PHOS 2.4 (L) 04/01/2014         Lipid Profile:   Lab Results   Component Value Date    CHOL 129 03/16/2015    CHOL 148 02/12/2014     Lab Results   Component Value Date    HDL 31 (L) 08/30/2022    HDL 30 (L) 01/28/2022    HDL 34 (L) 02/27/2021     Lab Results   Component Value Date    LDLCALC 65 08/30/2022    LDLCALC 58 01/28/2022    LDLCALC 73 02/27/2021     Lab Results   Component Value Date    TRIG 120 08/30/2022    TRIG 193 (H) 01/28/2022    TRIG 161 (H) 02/27/2021       Medications:    Current Outpatient Medications:   •  aspirin 81 MG tablet, Take 81 mg by mouth daily. , Disp: , Rfl:   •  atorvastatin (LIPITOR) 40 mg tablet, Take 1 tablet (40 mg total) by mouth daily, Disp: 90 tablet, Rfl: 3  •  betamethasone, augmented, (DIPROLENE) 0.05 % ointment, Apply topically 2 (two) times a day To psoriasis till clear, Disp: 50 g, Rfl: 3  •  calcipotriene (DOVONOX) 0.005 % ointment, Apply topically 2 (two) times a day, Disp: 60 g, Rfl: 3  •  enalapril (VASOTEC) 10 mg tablet, Take 1 tablet (10 mg total) by mouth daily, Disp: 90 tablet, Rfl: 2  •  levocetirizine (XYZAL) 5 MG tablet, Take 5 mg by mouth daily , Disp: , Rfl:   •  metoprolol succinate (TOPROL-XL) 25 mg 24 hr tablet, TAKE ONE TABLET BY MOUTH EVERY DAY, Disp: 90 tablet, Rfl: 3  •  montelukast (SINGULAIR) 10 mg tablet, TAKE ONE TABLET BY MOUTH EVERY DAY AT BEDTIME, Disp: 30 tablet, Rfl: 3  •  nitroglycerin (NITROSTAT) 0.4 mg SL tablet, Place 0.4 mg under the tongue every 5 (five) minutes as needed for chest pain, Disp: , Rfl:   •  omeprazole (PriLOSEC) 20 mg delayed release capsule, Take 1 capsule (20 mg total) by mouth daily, Disp: 30 capsule, Rfl: 2  •  PARoxetine (PAXIL) 30 mg tablet, TAKE ONE TABLET BY MOUTH EVERY DAY (Patient taking differently: Patient taking every other day 09/23/22), Disp: 30 tablet, Rfl: 5      Allergies: Allergies   Allergen Reactions   • Penicillins Hives         Assessment and Plan:  1. CAD S/P percutaneous coronary angioplasty S/P CABG  Doing well. Continue aspirin, statin, ACE inhibitor, beta-blocker, as needed SL NTG    2. Essential hypertension  BP not at goal.  Increase enalapril from 5 mg to 10 mg daily. Continue metoprolol the same. Come in next week for BP check and further medication adjustment if needed    3. Diastolic dysfunction  Tight BP control    4. Mixed hyperlipidemia  Continue statin and diet control. His PCP closely monitors his blood work    Recommend aggressive risk factor modification and therapeutic lifestyle changes. Low-salt, low-calorie, low-fat, low-cholesterol diet with regular exercise and to optimize weight.   I will defer the ordering and monitoring of necessity lab studies to you, but I am available and happy to review and manage any of the data at your request in the future. Discussed concepts of atherosclerosis, including signs and symptoms of cardiac disease. Previous studies were reviewed. Safety measures were reviewed. Questions were entertained and answered. Patient was advised to report any problems requiring medical attention. Follow-up with PCP and appropriate specialist and lab work as discussed. Return for follow up visit as scheduled or earlier, if needed. Thank you for allowing me to participate in the care and evaluation of your patient. Should you have any questions, please feel free to contact me.       Rosana Reed MD  9/6/2023,12:34 PM

## 2023-09-12 ENCOUNTER — CLINICAL SUPPORT (OUTPATIENT)
Dept: CARDIOLOGY CLINIC | Facility: CLINIC | Age: 73
End: 2023-09-12
Payer: MEDICARE

## 2023-09-12 VITALS
SYSTOLIC BLOOD PRESSURE: 156 MMHG | BODY MASS INDEX: 29.63 KG/M2 | RESPIRATION RATE: 16 BRPM | HEIGHT: 70 IN | HEART RATE: 52 BPM | DIASTOLIC BLOOD PRESSURE: 68 MMHG | WEIGHT: 207 LBS | OXYGEN SATURATION: 97 %

## 2023-09-12 DIAGNOSIS — I10 ESSENTIAL HYPERTENSION: ICD-10-CM

## 2023-09-12 DIAGNOSIS — R07.9 CHEST PAIN, UNSPECIFIED TYPE: Primary | ICD-10-CM

## 2023-09-12 PROCEDURE — 99211 OFF/OP EST MAY X REQ PHY/QHP: CPT

## 2023-09-12 RX ORDER — NITROGLYCERIN 0.4 MG/1
0.4 TABLET SUBLINGUAL
Qty: 30 TABLET | Refills: 1 | Status: SHIPPED | OUTPATIENT
Start: 2023-09-12

## 2023-09-12 RX ORDER — ENALAPRIL MALEATE 10 MG/1
10 TABLET ORAL 2 TIMES DAILY
Qty: 180 TABLET | Refills: 3 | Status: SHIPPED | OUTPATIENT
Start: 2023-09-12

## 2023-09-12 NOTE — PROGRESS NOTES
Pt was in the office today for a BP check instructed by Dr. Jayson Rinaldi since enalapril was increased from 5 mg daily to 10 mg daily. Pt's BP today /68              /76      HR 52  Pt's BP was discussed with Dr. Lupe Sullivan and pt was advised to increase enalapril from 10 mg daily to  enalapril 10 mg two times daily. Pt was also advised to continue monitor his BP at home and notify us of any High BP. Pt verbally understood.

## 2023-09-15 DIAGNOSIS — I10 ESSENTIAL HYPERTENSION: ICD-10-CM

## 2023-09-15 RX ORDER — METOPROLOL SUCCINATE 25 MG/1
TABLET, EXTENDED RELEASE ORAL
Qty: 90 TABLET | Refills: 3 | Status: SHIPPED | OUTPATIENT
Start: 2023-09-15

## 2023-10-03 DIAGNOSIS — F32.A DEPRESSION, UNSPECIFIED DEPRESSION TYPE: ICD-10-CM

## 2023-10-03 DIAGNOSIS — E11.9 DIABETES MELLITUS IN REMISSION (HCC): Primary | ICD-10-CM

## 2023-10-03 RX ORDER — PAROXETINE 30 MG/1
30 TABLET, FILM COATED ORAL DAILY
Qty: 30 TABLET | Refills: 5 | Status: SHIPPED | OUTPATIENT
Start: 2023-10-03

## 2023-11-08 ENCOUNTER — OFFICE VISIT (OUTPATIENT)
Age: 73
End: 2023-11-08
Payer: MEDICARE

## 2023-11-08 VITALS
DIASTOLIC BLOOD PRESSURE: 72 MMHG | RESPIRATION RATE: 17 BRPM | HEART RATE: 60 BPM | BODY MASS INDEX: 29.98 KG/M2 | WEIGHT: 209.4 LBS | OXYGEN SATURATION: 97 % | SYSTOLIC BLOOD PRESSURE: 160 MMHG | HEIGHT: 70 IN

## 2023-11-08 DIAGNOSIS — Z98.61 CAD S/P PERCUTANEOUS CORONARY ANGIOPLASTY: ICD-10-CM

## 2023-11-08 DIAGNOSIS — I25.10 CAD S/P PERCUTANEOUS CORONARY ANGIOPLASTY: ICD-10-CM

## 2023-11-08 DIAGNOSIS — E78.2 MIXED HYPERLIPIDEMIA: ICD-10-CM

## 2023-11-08 DIAGNOSIS — R06.2 WHEEZING: ICD-10-CM

## 2023-11-08 DIAGNOSIS — E11.9 DIABETES MELLITUS IN REMISSION (HCC): ICD-10-CM

## 2023-11-08 DIAGNOSIS — I10 PRIMARY HYPERTENSION: ICD-10-CM

## 2023-11-08 DIAGNOSIS — Z00.00 MEDICARE ANNUAL WELLNESS VISIT, SUBSEQUENT: Primary | ICD-10-CM

## 2023-11-08 PROBLEM — Z13.6 SCREENING FOR AAA (AORTIC ABDOMINAL ANEURYSM): Status: RESOLVED | Noted: 2023-11-08 | Resolved: 2023-11-08

## 2023-11-08 PROBLEM — Z13.6 SCREENING FOR AAA (AORTIC ABDOMINAL ANEURYSM): Status: ACTIVE | Noted: 2023-11-08

## 2023-11-08 PROCEDURE — G0439 PPPS, SUBSEQ VISIT: HCPCS

## 2023-11-08 RX ORDER — ALBUTEROL SULFATE 90 UG/1
2 AEROSOL, METERED RESPIRATORY (INHALATION) EVERY 6 HOURS PRN
Qty: 18 G | Refills: 5 | Status: SHIPPED | OUTPATIENT
Start: 2023-11-08

## 2023-11-08 NOTE — PROGRESS NOTES
Assessment and Plan:     Problem List Items Addressed This Visit     CAD S/P percutaneous coronary angioplasty    Hypertension    Hyperlipidemia    Major depressive disorder with single episode, in full remission ([address])    Diabetes mellitus in remission ([address]) - Primary   Other Visit Diagnoses     Medicare annual wellness visit, subsequent               Preventive health issues were discussed with patient, and age appropriate screening tests were ordered as noted in patient's After Visit Summary. Personalized health advice and appropriate referrals for health education or preventive services given if needed, as noted in patient's After Visit Summary.      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 ([address])   • Major depressive disorder with single episode, in full remission ([address])   • Diabetes mellitus in remission [facility])      Past Medical and Surgical History:     Past Medical History:   Diagnosis Date   • 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   • Allergic rhinitis    • Anemia    • Anxiety    • Atherosclerosis    • CAD (coronary artery disease)    • Colitis    • Depression    • Diabetes mellitus ([address])    • Difficulty breathing    • Gastritis    • GERD (gastroesophageal reflux disease)    • Hepatic cyst    • Hyperlipidemia    • Hypertension    • IFG (impaired fasting glucose)    • Myocardial infarction ([address])     9 YEARS AGO   • Psoriasis      Past Surgical History:   Procedure Laterality Date   • ANGIOPLASTY     • COLON SURGERY     • CORONARY ARTERY BYPASS GRAFT • [place] COLONOSCOPY FLX DX W/COLLJ SPEC WHEN PFRMD N/A 2/21/2017    Procedure: COLONOSCOPY;  Surgeon: Hector Fam MD;  Location: [facility]; Service: Gastroenterology   • WI RMVL HEATHER CTR VAD W/SUBQ PORT/ CTR/PRPH INSJ Left 5/13/2016    Procedure: REMOVAL VENOUS PORT (PORT-A-CATH); Surgeon: Mati Harvey MD;  Location: [facility];  Service: Colorectal      Family History:     Family History   Problem Relation Age of Onset   • Colon cancer Family    • Diverticulosis Family         colonic   • Cancer Father       Social History:     Social History     Socioeconomic History   • Marital status: /Civil Union     Spouse name: Not on file   • Number of children: Not on file   • Years of education: Not on file   • Highest education level: Not on file   Occupational History   • Not on file   Tobacco Use   • Smoking status: Never   • Smokeless tobacco: Never   Vaping Use   • Vaping Use: Never used   Substance and Sexual Activity   • Alcohol use: Yes     Comment: social   • Drug use: No   • Sexual activity: Not Currently     Partners: Female   Other Topics Concern   • Not on file   Social History Narrative    No advance directives     Social Determinants of Health     Financial Resource Strain: Low Risk  (11/7/2023)    Overall Financial Resource Strain (CARDIA)    • Difficulty of Paying Living Expenses: Not very hard   Food Insecurity: Not on file   Transportation Needs: No Transportation Needs (11/7/2023)    PRAPARE - Transportation    • Lack of Transportation (Medical): No    • Lack of Transportation (Non-Medical): No   Physical Activity: Unknown (8/31/2022)    Exercise Vital Sign    • Days of Exercise per Week: 0 days    • Minutes of Exercise per Session: Not on file   Stress: No Stress Concern Present (9/23/2022)    [address] [place] [address]    • Feeling of Stress :  Only a little   Social Connections: Not on file   Intimate Partner Violence: Not on file Housing Stability: Not on file      Medications and Allergies:     Current Outpatient Medications   Medication Sig Dispense Refill   • aspirin 81 MG tablet Take 81 mg by mouth daily. • atorvastatin (LIPITOR) 40 mg tablet Take 1 tablet (40 mg total) by mouth daily 90 tablet 3   • betamethasone, augmented, (DIPROLENE) 0.05 % ointment Apply topically 2 (two) times a day To psoriasis till clear 50 g 3   • calcipotriene (DOVONOX) 0.005 % ointment Apply topically 2 (two) times a day 60 g 3   • enalapril (VASOTEC) 10 mg tablet Take 1 tablet (10 mg total) by mouth 2 (two) times a day 180 tablet 3   • levocetirizine (XYZAL) 5 MG tablet Take 5 mg by mouth daily      • metoprolol succinate (TOPROL-XL) 25 mg 24 hr tablet TAKE ONE TABLET BY MOUTH EVERY DAY 90 tablet 3   • montelukast (SINGULAIR) 10 mg tablet TAKE ONE TABLET BY MOUTH EVERY DAY AT BEDTIME 30 tablet 3   • nitroglycerin (NITROSTAT) 0.4 mg SL tablet Place 1 tablet (0.4 mg total) under the tongue every 5 (five) minutes as needed for chest pain 30 tablet 1   • omeprazole (PriLOSEC) 20 mg delayed release capsule Take 1 capsule (20 mg total) by mouth daily 30 capsule 2   • PARoxetine (PAXIL) 30 mg tablet Take 1 tablet (30 mg total) by mouth daily 30 tablet 5     No current facility-administered medications for this visit.      Allergies   Allergen Reactions   • Penicillins Hives and Other (See Comments)      Immunizations:     Immunization History   Administered Date(s) Administered   • COVID-19 MODERNA VACC 0.5 ML IM 11/20/2021, 12/18/2021   • Influenza, seasonal, injectable 1950   • Pneumococcal Polysaccharide PPV23 1950   • Tdap 1950   • Zoster 1950      Health Maintenance:         Topic Date Due   • Hepatitis C Screening  Completed         Topic Date Due   • Pneumococcal Vaccine: 65+ Years (1 - PCV) 01/01/1956   • COVID-19 Vaccine (3 - Moderna series) 02/12/2022   • Influenza Vaccine (1) 09/01/2023      Medicare Screening Tests and Risk Assessments:     Annual Wellness Visit  No results found. Physical Exam:     There were no vitals taken for this visit.     Physical Exam     Reba Disla PA-C

## 2023-11-08 NOTE — PATIENT INSTRUCTIONS
Medicare Preventive Visit Patient Instructions  Thank you for completing your Welcome to Medicare Visit or Medicare Annual Wellness Visit today. Your next wellness visit will be due in one year (11/8/2024). The screening/preventive services that you may require over the next 5-10 years are detailed below. Some tests may not apply to you based off risk factors and/or age. Screening tests ordered at today's visit but not completed yet may show as past due. Also, please note that scanned in results may not display below. Preventive Screenings:  Service Recommendations Previous Testing/Comments   Colorectal Cancer Screening  Colonoscopy    Fecal Occult Blood Test (FOBT)/Fecal Immunochemical Test (FIT)  Fecal DNA/Cologuard Test  Flexible Sigmoidoscopy Age: 43-73 years old   Colonoscopy: every 10 years (May be performed more frequently if at higher risk)  OR  FOBT/FIT: every 1 year  OR  Cologuard: every 3 years  OR  Sigmoidoscopy: every 5 years  Screening may be recommended earlier than age 39 if at higher risk for colorectal cancer. Also, an individualized decision between you and your healthcare provider will decide whether screening between the ages of 77-80 would be appropriate.  Colonoscopy: 02/21/2017  FOBT/FIT: Not on file  Cologuard: Not on file  Sigmoidoscopy: Not on file    History Colorectal Cancer  Screening Not Indicated     Prostate Cancer Screening Individualized decision between patient and health care provider in men between ages of 53-66   Medicare will cover every 12 months beginning on the day after your 50th birthday PSA: 2.2 ng/mL     Screening Not Indicated     Hepatitis C Screening Once for adults born between 1945 and 1965  More frequently in patients at high risk for Hepatitis C Hep C Antibody: 02/23/2019    Screening Current   Diabetes Screening 1-2 times per year if you're at risk for diabetes or have pre-diabetes Fasting glucose: 102 mg/dL (8/30/2022)  A1C: 5.8 % (8/30/2022)  History Diabetes  Risks and Benefits Discussed  Due for Blood Glucose   Cholesterol Screening Once every 5 years if you don't have a lipid disorder. May order more often based on risk factors. Lipid panel: 08/30/2022  History Lipid Disorder  Risks and Benefits Discussed  Due for Lipid Panel      Other Preventive Screenings Covered by Medicare:  Abdominal Aortic Aneurysm (AAA) Screening: covered once if your at risk. You're considered to be at risk if you have a family history of AAA or a male between the age of 70-76 who smoking at least 100 cigarettes in your lifetime. Lung Cancer Screening: covers low dose CT scan once per year if you meet all of the following conditions: (1) Age 48-67; (2) No signs or symptoms of lung cancer; (3) Current smoker or have quit smoking within the last 15 years; (4) You have a tobacco smoking history of at least 20 pack years (packs per day x number of years you smoked); (5) You get a written order from a healthcare provider. Glaucoma Screening: covered annually if you're considered high risk: (1) You have diabetes OR (2) Family history of glaucoma OR (3)  aged 48 and older OR (3)  American aged 72 and older  Osteoporosis Screening: covered every 2 years if you meet one of the following conditions: (1) Have a vertebral abnormality; (2) On glucocorticoid therapy for more than 3 months; (3) Have primary hyperparathyroidism; (4) On osteoporosis medications and need to assess response to drug therapy. HIV Screening: covered annually if you're between the age of 14-79. Also covered annually if you are younger than 13 and older than 72 with risk factors for HIV infection. For pregnant patients, it is covered up to 3 times per pregnancy.     Immunizations:  Immunization Recommendations   Influenza Vaccine Annual influenza vaccination during flu season is recommended for all persons aged >= 6 months who do not have contraindications   Pneumococcal Vaccine   * Pneumococcal conjugate vaccine = PCV13 (Prevnar 13), PCV15 (Vaxneuvance), PCV20 (Prevnar 20)  * Pneumococcal polysaccharide vaccine = PPSV23 (Pneumovax) Adults 73-90 yo with certain risk factors or if 69+ yo  If never received any pneumonia vaccine: recommend Prevnar 20 (PCV20)  Give PCV20 if previously received 1 dose of PCV13 or PPSV23   Hepatitis B Vaccine 3 dose series if at intermediate or high risk (ex: diabetes, end stage renal disease, liver disease)   Respiratory syncytial virus (RSV) Vaccine - COVERED BY MEDICARE PART D  * RSVPreF3 (Arexvy) CDC recommends that adults 61years of age and older may receive a single dose of RSV vaccine using shared clinical decision-making (SCDM)   Tetanus (Td) Vaccine - COST NOT COVERED BY MEDICARE PART B Following completion of primary series, a booster dose should be given every 10 years to maintain immunity against tetanus. Td may also be given as tetanus wound prophylaxis. Tdap Vaccine - COST NOT COVERED BY MEDICARE PART B Recommended at least once for all adults. For pregnant patients, recommended with each pregnancy. Shingles Vaccine (Shingrix) - COST NOT COVERED BY MEDICARE PART B  2 shot series recommended in those 19 years and older who have or will have weakened immune systems or those 50 years and older     Health Maintenance Due:      Topic Date Due   • Hepatitis C Screening  Completed     Immunizations Due:      Topic Date Due   • Pneumococcal Vaccine: 65+ Years (1 - PCV) 01/01/1956   • COVID-19 Vaccine (3 - Moderna series) 02/12/2022   • Influenza Vaccine (1) 09/01/2023     Advance Directives   What are advance directives? Advance directives are legal documents that state your wishes and plans for medical care. These plans are made ahead of time in case you lose your ability to make decisions for yourself. Advance directives can apply to any medical decision, such as the treatments you want, and if you want to donate organs. What are the types of advance directives? There are many types of advance directives, and each state has rules about how to use them. You may choose a combination of any of the following:  Living will: This is a written record of the treatment you want. You can also choose which treatments you do not want, which to limit, and which to stop at a certain time. This includes surgery, medicine, IV fluid, and tube feedings. Durable power of  for healthcare [facility]): This is a written record that states who you want to make healthcare choices for you when you are unable to make them for yourself. This person, called a proxy, is usually a family member or a friend. You may choose more than 1 proxy. Do not resuscitate (DNR) order:  A DNR order is used in case your heart stops beating or you stop breathing. It is a request not to have certain forms of treatment, such as CPR. A DNR order may be included in other types of advance directives. Medical directive: This covers the care that you want if you are in a coma, near death, or unable to make decisions for yourself. You can list the treatments you want for each condition. Treatment may include pain medicine, surgery, blood transfusions, dialysis, IV or tube feedings, and a ventilator (breathing machine). Values history: This document has questions about your views, beliefs, and how you feel and think about life. This information can help others choose the care that you would choose. Why are advance directives important? An advance directive helps you control your care. Although spoken wishes may be used, it is better to have your wishes written down. Spoken wishes can be misunderstood, or not followed. Treatments may be given even if you do not want them. An advance directive may make it easier for your family to make difficult choices about your care.    Weight Management   Why it is important to manage your weight:  Being overweight increases your risk of health conditions such as heart disease, high blood pressure, type 2 diabetes, and certain types of cancer. It can also increase your risk for osteoarthritis, sleep apnea, and other respiratory problems. Aim for a slow, steady weight loss. Even a small amount of weight loss can lower your risk of health problems. How to lose weight safely:  A safe and healthy way to lose weight is to eat fewer calories and get regular exercise. You can lose up about 1 pound a week by decreasing the number of calories you eat by 500 calories each day. Healthy meal plan for weight management:  A healthy meal plan includes a variety of foods, contains fewer calories, and helps you stay healthy. A healthy meal plan includes the following:  Eat whole-grain foods more often. A healthy meal plan should contain fiber. Fiber is the part of grains, fruits, and vegetables that is not broken down by your body. Whole-grain foods are healthy and provide extra fiber in your diet. Some examples of whole-grain foods are whole-wheat breads and pastas, oatmeal, brown rice, and bulgur. Eat a variety of vegetables every day. Include dark, leafy greens such as spinach, kale, yobany greens, and mustard greens. Eat yellow and orange vegetables such as carrots, sweet potatoes, and winter squash. Eat a variety of fruits every day. Choose fresh or canned fruit (canned in its own juice or light syrup) instead of juice. Fruit juice has very little or no fiber. Eat low-fat dairy foods. Drink fat-free (skim) milk or 1% milk. Eat fat-free yogurt and low-fat cottage cheese. Try low-fat cheeses such as mozzarella and other reduced-fat cheeses. Choose meat and other protein foods that are low in fat. Choose beans or other legumes such as split peas or lentils. Choose fish, skinless poultry (chicken or turkey), or lean cuts of red meat (beef or pork). Before you cook meat or poultry, cut off any visible fat. Use less fat and oil. Try baking foods instead of frying them.  Add less fat, such as margarine, sour cream, regular salad dressing and mayonnaise to foods. Eat fewer high-fat foods. Some examples of high-fat foods include french fries, doughnuts, ice cream, and cakes. Eat fewer sweets. Limit foods and drinks that are high in sugar. This includes candy, cookies, regular soda, and sweetened drinks. Exercise:  Exercise at least 30 minutes per day on most days of the week. Some examples of exercise include walking, biking, dancing, and swimming. You can also fit in more physical activity by taking the stairs instead of the elevator or parking farther away from stores. Ask your healthcare provider about the best exercise plan for you. © Copyright [employer] 2018 Information is for End User's use only and may not be sold, redistributed or otherwise used for commercial purposes. All illustrations and images included in CareNotes® are the copyrighted property of [employer]. or [facility] Preventive Visit Patient Instructions  Thank you for completing your Welcome to Medicare Visit or Medicare Annual Wellness Visit today. Your next wellness visit will be due in one year (11/8/2024). The screening/preventive services that you may require over the next 5-10 years are detailed below. Some tests may not apply to you based off risk factors and/or age. Screening tests ordered at today's visit but not completed yet may show as past due. Also, please note that scanned in results may not display below.   Preventive Screenings:  Service Recommendations Previous Testing/Comments   Colorectal Cancer Screening  Colonoscopy    Fecal Occult Blood Test (FOBT)/Fecal Immunochemical Test (FIT)  Fecal DNA/Cologuard Test  Flexible Sigmoidoscopy Age: 43-73 years old   Colonoscopy: every 10 years (May be performed more frequently if at higher risk)  OR  FOBT/FIT: every 1 year  OR  Cologuard: every 3 years  OR  Sigmoidoscopy: every 5 years  Screening may be recommended earlier than age 39 if at higher risk for colorectal cancer. Also, an individualized decision between you and your healthcare provider will decide whether screening between the ages of 77-80 would be appropriate. Colonoscopy: 02/21/2017  FOBT/FIT: Not on file  Cologuard: Not on file  Sigmoidoscopy: Not on file    History Colorectal Cancer  Screening Not Indicated     Prostate Cancer Screening Individualized decision between patient and health care provider in men between ages of 53-66   Medicare will cover every 12 months beginning on the day after your 50th birthday PSA: 2.2 ng/mL     Screening Not Indicated     Hepatitis C Screening Once for adults born between 1945 and 1965  More frequently in patients at high risk for Hepatitis C Hep C Antibody: 02/23/2019    Screening Current   Diabetes Screening 1-2 times per year if you're at risk for diabetes or have pre-diabetes Fasting glucose: 102 mg/dL (8/30/2022)  A1C: 5.8 % (8/30/2022)  History Diabetes  Risks and Benefits Discussed  Due for Blood Glucose   Cholesterol Screening Once every 5 years if you don't have a lipid disorder. May order more often based on risk factors. Lipid panel: 08/30/2022  History Lipid Disorder  Risks and Benefits Discussed  Due for Lipid Panel      Other Preventive Screenings Covered by Medicare:  Abdominal Aortic Aneurysm (AAA) Screening: covered once if your at risk. You're considered to be at risk if you have a family history of AAA or a male between the age of 70-76 who smoking at least 100 cigarettes in your lifetime. Lung Cancer Screening: covers low dose CT scan once per year if you meet all of the following conditions: (1) Age 48-67; (2) No signs or symptoms of lung cancer; (3) Current smoker or have quit smoking within the last 15 years; (4) You have a tobacco smoking history of at least 20 pack years (packs per day x number of years you smoked); (5) You get a written order from a healthcare provider.   Glaucoma Screening: covered annually if you're considered high risk: (1) You have diabetes OR (2) Family history of glaucoma OR (3)  aged 48 and older OR (4)  American aged 72 and older  Osteoporosis Screening: covered every 2 years if you meet one of the following conditions: (1) Have a vertebral abnormality; (2) On glucocorticoid therapy for more than 3 months; (3) Have primary hyperparathyroidism; (4) On osteoporosis medications and need to assess response to drug therapy. HIV Screening: covered annually if you're between the age of 14-79. Also covered annually if you are younger than 13 and older than 72 with risk factors for HIV infection. For pregnant patients, it is covered up to 3 times per pregnancy. Immunizations:  Immunization Recommendations   Influenza Vaccine Annual influenza vaccination during flu season is recommended for all persons aged >= 6 months who do not have contraindications   Pneumococcal Vaccine   * Pneumococcal conjugate vaccine = PCV13 (Prevnar 13), PCV15 (Vaxneuvance), PCV20 (Prevnar 20)  * Pneumococcal polysaccharide vaccine = PPSV23 (Pneumovax) Adults 09-28 yo with certain risk factors or if 69+ yo  If never received any pneumonia vaccine: recommend Prevnar 20 (PCV20)  Give PCV20 if previously received 1 dose of PCV13 or PPSV23   Hepatitis B Vaccine 3 dose series if at intermediate or high risk (ex: diabetes, end stage renal disease, liver disease)   Respiratory syncytial virus (RSV) Vaccine - COVERED BY MEDICARE PART D  * RSVPreF3 (Arexvy) CDC recommends that adults 61years of age and older may receive a single dose of RSV vaccine using shared clinical decision-making (SCDM)   Tetanus (Td) Vaccine - COST NOT COVERED BY MEDICARE PART B Following completion of primary series, a booster dose should be given every 10 years to maintain immunity against tetanus. Td may also be given as tetanus wound prophylaxis. Tdap Vaccine - COST NOT COVERED BY MEDICARE PART B Recommended at least once for all adults. For pregnant patients, recommended with each pregnancy. Shingles Vaccine (Shingrix) - COST NOT COVERED BY MEDICARE PART B  2 shot series recommended in those 19 years and older who have or will have weakened immune systems or those 50 years and older     Health Maintenance Due:      Topic Date Due   • Hepatitis C Screening  Completed     Immunizations Due:      Topic Date Due   • Pneumococcal Vaccine: 65+ Years (1 - PCV) 01/01/1956   • COVID-19 Vaccine (3 - Moderna series) 02/12/2022   • Influenza Vaccine (1) 09/01/2023     Advance Directives   What are advance directives? Advance directives are legal documents that state your wishes and plans for medical care. These plans are made ahead of time in case you lose your ability to make decisions for yourself. Advance directives can apply to any medical decision, such as the treatments you want, and if you want to donate organs. What are the types of advance directives? There are many types of advance directives, and each state has rules about how to use them. You may choose a combination of any of the following:  Living will: This is a written record of the treatment you want. You can also choose which treatments you do not want, which to limit, and which to stop at a certain time. This includes surgery, medicine, IV fluid, and tube feedings. Durable power of  for healthcare [facility]): This is a written record that states who you want to make healthcare choices for you when you are unable to make them for yourself. This person, called a proxy, is usually a family member or a friend. You may choose more than 1 proxy. Do not resuscitate (DNR) order:  A DNR order is used in case your heart stops beating or you stop breathing. It is a request not to have certain forms of treatment, such as CPR. A DNR order may be included in other types of advance directives. Medical directive:   This covers the care that you want if you are in a coma, near death, or unable to make decisions for yourself. You can list the treatments you want for each condition. Treatment may include pain medicine, surgery, blood transfusions, dialysis, IV or tube feedings, and a ventilator (breathing machine). Values history: This document has questions about your views, beliefs, and how you feel and think about life. This information can help others choose the care that you would choose. Why are advance directives important? An advance directive helps you control your care. Although spoken wishes may be used, it is better to have your wishes written down. Spoken wishes can be misunderstood, or not followed. Treatments may be given even if you do not want them. An advance directive may make it easier for your family to make difficult choices about your care. Weight Management   Why it is important to manage your weight:  Being overweight increases your risk of health conditions such as heart disease, high blood pressure, type 2 diabetes, and certain types of cancer. It can also increase your risk for osteoarthritis, sleep apnea, and other respiratory problems. Aim for a slow, steady weight loss. Even a small amount of weight loss can lower your risk of health problems. How to lose weight safely:  A safe and healthy way to lose weight is to eat fewer calories and get regular exercise. You can lose up about 1 pound a week by decreasing the number of calories you eat by 500 calories each day. Healthy meal plan for weight management:  A healthy meal plan includes a variety of foods, contains fewer calories, and helps you stay healthy. A healthy meal plan includes the following:  Eat whole-grain foods more often. A healthy meal plan should contain fiber. Fiber is the part of grains, fruits, and vegetables that is not broken down by your body. Whole-grain foods are healthy and provide extra fiber in your diet.  Some examples of whole-grain foods are whole-wheat breads and pastas, oatmeal, brown rice, and bulgur. Eat a variety of vegetables every day. Include dark, leafy greens such as spinach, kale, yobany greens, and mustard greens. Eat yellow and orange vegetables such as carrots, sweet potatoes, and winter squash. Eat a variety of fruits every day. Choose fresh or canned fruit (canned in its own juice or light syrup) instead of juice. Fruit juice has very little or no fiber. Eat low-fat dairy foods. Drink fat-free (skim) milk or 1% milk. Eat fat-free yogurt and low-fat cottage cheese. Try low-fat cheeses such as mozzarella and other reduced-fat cheeses. Choose meat and other protein foods that are low in fat. Choose beans or other legumes such as split peas or lentils. Choose fish, skinless poultry (chicken or turkey), or lean cuts of red meat (beef or pork). Before you cook meat or poultry, cut off any visible fat. Use less fat and oil. Try baking foods instead of frying them. Add less fat, such as margarine, sour cream, regular salad dressing and mayonnaise to foods. Eat fewer high-fat foods. Some examples of high-fat foods include french fries, doughnuts, ice cream, and cakes. Eat fewer sweets. Limit foods and drinks that are high in sugar. This includes candy, cookies, regular soda, and sweetened drinks. Exercise:  Exercise at least 30 minutes per day on most days of the week. Some examples of exercise include walking, biking, dancing, and swimming. You can also fit in more physical activity by taking the stairs instead of the elevator or parking farther away from stores. Ask your healthcare provider about the best exercise plan for you. © Copyright [employer] 2018 Information is for End User's use only and may not be sold, redistributed or otherwise used for commercial purposes.  All illustrations and images included in CareNotes® are the copyrighted property of [employer] or [facility] Preventive Visit Patient Instructions  Thank you for completing your Welcome to Medicare Visit or Medicare Annual Wellness Visit today. Your next wellness visit will be due in one year (11/8/2024). The screening/preventive services that you may require over the next 5-10 years are detailed below. Some tests may not apply to you based off risk factors and/or age. Screening tests ordered at today's visit but not completed yet may show as past due. Also, please note that scanned in results may not display below. Preventive Screenings:  Service Recommendations Previous Testing/Comments   Colorectal Cancer Screening  Colonoscopy    Fecal Occult Blood Test (FOBT)/Fecal Immunochemical Test (FIT)  Fecal DNA/Cologuard Test  Flexible Sigmoidoscopy Age: 43-73 years old   Colonoscopy: every 10 years (May be performed more frequently if at higher risk)  OR  FOBT/FIT: every 1 year  OR  Cologuard: every 3 years  OR  Sigmoidoscopy: every 5 years  Screening may be recommended earlier than age 39 if at higher risk for colorectal cancer. Also, an individualized decision between you and your healthcare provider will decide whether screening between the ages of 77-80 would be appropriate.  Colonoscopy: 02/21/2017  FOBT/FIT: Not on file  Cologuard: Not on file  Sigmoidoscopy: Not on file    History Colorectal Cancer  Screening Not Indicated     Prostate Cancer Screening Individualized decision between patient and health care provider in men between ages of 53-66   Medicare will cover every 12 months beginning on the day after your 50th birthday PSA: 2.2 ng/mL     Screening Not Indicated     Hepatitis C Screening Once for adults born between 1945 and 1965  More frequently in patients at high risk for Hepatitis C Hep C Antibody: 02/23/2019    Screening Current   Diabetes Screening 1-2 times per year if you're at risk for diabetes or have pre-diabetes Fasting glucose: 102 mg/dL (8/30/2022)  A1C: 5.8 % (8/30/2022)  History Diabetes  Risks and Benefits Discussed  Due for Blood Glucose Cholesterol Screening Once every 5 years if you don't have a lipid disorder. May order more often based on risk factors. Lipid panel: 08/30/2022  History Lipid Disorder  Risks and Benefits Discussed  Due for Lipid Panel      Other Preventive Screenings Covered by Medicare:  Abdominal Aortic Aneurysm (AAA) Screening: covered once if your at risk. You're considered to be at risk if you have a family history of AAA or a male between the age of 70-76 who smoking at least 100 cigarettes in your lifetime. Lung Cancer Screening: covers low dose CT scan once per year if you meet all of the following conditions: (1) Age 48-67; (2) No signs or symptoms of lung cancer; (3) Current smoker or have quit smoking within the last 15 years; (4) You have a tobacco smoking history of at least 20 pack years (packs per day x number of years you smoked); (5) You get a written order from a healthcare provider. Glaucoma Screening: covered annually if you're considered high risk: (1) You have diabetes OR (2) Family history of glaucoma OR (3)  aged 48 and older OR (3)  American aged 72 and older  Osteoporosis Screening: covered every 2 years if you meet one of the following conditions: (1) Have a vertebral abnormality; (2) On glucocorticoid therapy for more than 3 months; (3) Have primary hyperparathyroidism; (4) On osteoporosis medications and need to assess response to drug therapy. HIV Screening: covered annually if you're between the age of 14-79. Also covered annually if you are younger than 13 and older than 72 with risk factors for HIV infection. For pregnant patients, it is covered up to 3 times per pregnancy.     Immunizations:  Immunization Recommendations   Influenza Vaccine Annual influenza vaccination during flu season is recommended for all persons aged >= 6 months who do not have contraindications   Pneumococcal Vaccine   * Pneumococcal conjugate vaccine = PCV13 (Prevnar 13), PCV15 (Vaxneuvance), PCV20 (Prevnar 20)  * Pneumococcal polysaccharide vaccine = PPSV23 (Pneumovax) Adults 03-73 yo with certain risk factors or if 69+ yo  If never received any pneumonia vaccine: recommend Prevnar 20 (PCV20)  Give PCV20 if previously received 1 dose of PCV13 or PPSV23   Hepatitis B Vaccine 3 dose series if at intermediate or high risk (ex: diabetes, end stage renal disease, liver disease)   Respiratory syncytial virus (RSV) Vaccine - COVERED BY MEDICARE PART D  * RSVPreF3 (Arexvy) CDC recommends that adults 61years of age and older may receive a single dose of RSV vaccine using shared clinical decision-making (SCDM)   Tetanus (Td) Vaccine - COST NOT COVERED BY MEDICARE PART B Following completion of primary series, a booster dose should be given every 10 years to maintain immunity against tetanus. Td may also be given as tetanus wound prophylaxis. Tdap Vaccine - COST NOT COVERED BY MEDICARE PART B Recommended at least once for all adults. For pregnant patients, recommended with each pregnancy. Shingles Vaccine (Shingrix) - COST NOT COVERED BY MEDICARE PART B  2 shot series recommended in those 19 years and older who have or will have weakened immune systems or those 50 years and older     Health Maintenance Due:      Topic Date Due   • Hepatitis C Screening  Completed     Immunizations Due:      Topic Date Due   • Pneumococcal Vaccine: 65+ Years (1 - PCV) 01/01/1956   • COVID-19 Vaccine (3 - Moderna series) 02/12/2022   • Influenza Vaccine (1) 09/01/2023     Advance Directives   What are advance directives? Advance directives are legal documents that state your wishes and plans for medical care. These plans are made ahead of time in case you lose your ability to make decisions for yourself. Advance directives can apply to any medical decision, such as the treatments you want, and if you want to donate organs. What are the types of advance directives?   There are many types of advance directives, and each state has rules about how to use them. You may choose a combination of any of the following:  Living will: This is a written record of the treatment you want. You can also choose which treatments you do not want, which to limit, and which to stop at a certain time. This includes surgery, medicine, IV fluid, and tube feedings. Durable power of  for [facility]): This is a written record that states who you want to make healthcare choices for you when you are unable to make them for yourself. This person, called a proxy, is usually a family member or a friend. You may choose more than 1 proxy. Do not resuscitate (DNR) order:  A DNR order is used in case your heart stops beating or you stop breathing. It is a request not to have certain forms of treatment, such as CPR. A DNR order may be included in other types of advance directives. Medical directive: This covers the care that you want if you are in a coma, near death, or unable to make decisions for yourself. You can list the treatments you want for each condition. Treatment may include pain medicine, surgery, blood transfusions, dialysis, IV or tube feedings, and a ventilator (breathing machine). Values history: This document has questions about your views, beliefs, and how you feel and think about life. This information can help others choose the care that you would choose. Why are advance directives important? An advance directive helps you control your care. Although spoken wishes may be used, it is better to have your wishes written down. Spoken wishes can be misunderstood, or not followed. Treatments may be given even if you do not want them. An advance directive may make it easier for your family to make difficult choices about your care.    Weight Management   Why it is important to manage your weight:  Being overweight increases your risk of health conditions such as heart disease, high blood pressure, type 2 diabetes, and certain types of cancer. It can also increase your risk for osteoarthritis, sleep apnea, and other respiratory problems. Aim for a slow, steady weight loss. Even a small amount of weight loss can lower your risk of health problems. How to lose weight safely:  A safe and healthy way to lose weight is to eat fewer calories and get regular exercise. You can lose up about 1 pound a week by decreasing the number of calories you eat by 500 calories each day. Healthy meal plan for weight management:  A healthy meal plan includes a variety of foods, contains fewer calories, and helps you stay healthy. A healthy meal plan includes the following:  Eat whole-grain foods more often. A healthy meal plan should contain fiber. Fiber is the part of grains, fruits, and vegetables that is not broken down by your body. Whole-grain foods are healthy and provide extra fiber in your diet. Some examples of whole-grain foods are whole-wheat breads and pastas, oatmeal, brown rice, and bulgur. Eat a variety of vegetables every day. Include dark, leafy greens such as spinach, kale, yobany greens, and mustard greens. Eat yellow and orange vegetables such as carrots, sweet potatoes, and winter squash. Eat a variety of fruits every day. Choose fresh or canned fruit (canned in its own juice or light syrup) instead of juice. Fruit juice has very little or no fiber. Eat low-fat dairy foods. Drink fat-free (skim) milk or 1% milk. Eat fat-free yogurt and low-fat cottage cheese. Try low-fat cheeses such as mozzarella and other reduced-fat cheeses. Choose meat and other protein foods that are low in fat. Choose beans or other legumes such as split peas or lentils. Choose fish, skinless poultry (chicken or turkey), or lean cuts of red meat (beef or pork). Before you cook meat or poultry, cut off any visible fat. Use less fat and oil. Try baking foods instead of frying them.  Add less fat, such as margarine, sour cream, regular salad dressing and mayonnaise to foods. Eat fewer high-fat foods. Some examples of high-fat foods include french fries, doughnuts, ice cream, and cakes. Eat fewer sweets. Limit foods and drinks that are high in sugar. This includes candy, cookies, regular soda, and sweetened drinks. Exercise:  Exercise at least 30 minutes per day on most days of the week. Some examples of exercise include walking, biking, dancing, and swimming. You can also fit in more physical activity by taking the stairs instead of the elevator or parking farther away from stores. Ask your healthcare provider about the best exercise plan for you. © Copyright [employer] 2018 Information is for End User's use only and may not be sold, redistributed or otherwise used for commercial purposes.  All illustrations and images included in CareNotes® are the copyrighted property of [employer] or [address] [place] [address]

## 2023-11-08 NOTE — ASSESSMENT & PLAN NOTE
He has some slight N/T in his feet, but he was told it was due to chemotherapy. Last A1c was 5.8. He does not check sugars at home. Not currently on any medication at this time. He does not limit sugar intake. He does not go to eye doctor regularly. Notes some blurriness with distance in his eyes since his last eye doctor appt. Discussed following up with eye doctor for DM eye exam and to discuss blurriness with distance.   Lab Results   Component Value Date    HGBA1C 5.8 (H) 08/30/2022

## 2023-11-08 NOTE — ASSESSMENT & PLAN NOTE
/72 in office. He just saw cardiology and they increased his enalapril from 5 mg to 10 mg BID. He does not check BP at home. Limits salt intake to <2,000 mg a day. On BP recheck it was 158/76. Discussed reaching out to cardiology to make a blood pressure check follow-up to adjust his medications further.

## 2023-11-08 NOTE — PROGRESS NOTES
Assessment and Plan:     Problem List Items Addressed This Visit       CAD S/P percutaneous coronary angioplasty     Follows closely with cardiology. Denies any chest pain, leg swelling, palpitations at this time. Blood pressure is not in goal at this time. Will have lipid panel done in next few days. Hypertension     /72 in office. He just saw cardiology and they increased his enalapril from 5 mg to 10 mg BID. He does not check BP at home. Limits salt intake to <2,000 mg a day. On BP recheck it was 158/76. Discussed reaching out to cardiology to make a blood pressure check follow-up to adjust his medications further. Hyperlipidemia     Currently on atorvastatin 40 mg daily. Will recheck lipid panel. Limits fried foods, fatty foods, fast foods in his diet. Diabetes mellitus in remission [facility])     He has some slight N/T in his feet, but he was told it was due to chemotherapy. Last A1c was 5.8. He does not check sugars at home. Not currently on any medication at this time. He does not limit sugar intake. He does not go to eye doctor regularly. Notes some blurriness with distance in his eyes since his last eye doctor appt. Discussed following up with eye doctor for DM eye exam and to discuss blurriness with distance. Lab Results   Component Value Date    HGBA1C 5.8 (H) 08/30/2022          Relevant Orders    Albumin / creatinine urine ratio    Wheezing     He notes that he becomes wheezy sometimes when he gets out of the shower or during the summer. He mostly does not notice it, but his wife does. He denies any shortness of breath or history of asthma or lung disease. He does have seasonal allergies. May be related to allergies or asthma. Albuterol inhaler sent, discussed if this becomes more bothersome or he starts having SOB with it to let me know.          Relevant Medications    albuterol (Ventolin HFA) 90 mcg/act inhaler    Medicare annual wellness visit, subsequent - Primary     He eats some fruits, veggies, and proteins, but does not limit sugar or carb intake. Discussed trying to decrease sugary drinks like soda and extra snacks throughout the day. He does not drink a lot of water, discussed increasing water intake to >60 oz a day. He does no formal exercise, but works in [employer]. Discussed trying to exercise 30 mins 5x a week. He sleeps well. He does drink a few glasses of wine or [place]ukon tiffany a week. He denies any tobacco or drug use. He is UTD with dentist, but not eye doctor. BMI Counseling: Body mass index is 30.05 kg/m². The BMI is above normal. Nutrition recommendations include decreasing portion sizes, encouraging healthy choices of fruits and vegetables, decreasing fast food intake, consuming healthier snacks, limiting drinks that contain sugar, moderation in carbohydrate intake, increasing intake of lean protein, reducing intake of saturated and trans fat and reducing intake of cholesterol. Exercise recommendations include moderate physical activity 150 minutes/week and exercising 3-5 times per week. No pharmacotherapy was ordered. Patient referred to PCP. Rationale for BMI follow-up plan is due to patient being overweight or obese. Preventive health issues were discussed with patient, and age appropriate screening tests were ordered as noted in patient's After Visit Summary. Personalized health advice and appropriate referrals for health education or preventive services given if needed, as noted in patient's After Visit Summary. History of Present Illness:   Patient presents for a Medicare Wellness Visit. He has no new complaints today. Health maintenance:  Diabetic foot exam done in office today. Due for diabetic eye exam.  Labs ordered.     HPI   Patient Care Team:  Jos Fine MD as PCP - MD Mary Curiel MD as Endoscopist  [place], [place] as Physician Assistant (Physician Assistant) Review of Systems:     Review of Systems   Constitutional:  Negative for chills, fatigue and fever. HENT:  Positive for postnasal drip. Negative for ear discharge, ear pain, rhinorrhea, sinus pressure, sinus pain, sore throat, tinnitus and trouble swallowing. Eyes:  Negative for pain, discharge and itching. Respiratory:  Positive for wheezing. Negative for cough and shortness of breath. Cardiovascular:  Negative for chest pain, palpitations and leg swelling. Gastrointestinal:  Negative for abdominal pain, constipation, diarrhea, nausea and vomiting. Endocrine: Negative for polydipsia, polyphagia and polyuria. Genitourinary:  Negative for difficulty urinating, frequency, hematuria and urgency. Musculoskeletal:  Negative for arthralgias, joint swelling and myalgias. Skin:  Positive for rash (Psoriasis). Negative for color change. Allergic/Immunologic: Negative for environmental allergies. Neurological:  Negative for dizziness, weakness, light-headedness, numbness and headaches. Hematological:  Negative for adenopathy. Psychiatric/Behavioral:  Negative for decreased concentration and sleep disturbance. The patient is not nervous/anxious.          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 ([address])    Major depressive disorder with single episode, in full remission ([facility] [address])    Diabetes mellitus in remission ([address])    Wheezing    Medicare annual wellness visit, subsequent      Past Medical and Surgical History:     Past Medical History:   Diagnosis Date    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    Allergic rhinitis     Anemia     Anxiety     Atherosclerosis     CAD (coronary artery disease)     Colitis     Depression     Diabetes mellitus ([address])     Difficulty breathing     Gastritis     GERD (gastroesophageal reflux disease)     Hepatic cyst     Hyperlipidemia     Hypertension     IFG (impaired fasting glucose)     Myocardial infarction ([facility])     9 YEARS AGO    Psoriasis      Past Surgical History:   Procedure Laterality Date    ANGIOPLASTY      COLON SURGERY      CORONARY ARTERY BYPASS GRAFT      DC COLONOSCOPY FLX DX W/COLLJ SPEC WHEN PFRMD N/A 2/21/2017    Procedure: COLONOSCOPY;  Surgeon: Hector Fam MD;  Location: [facility]; Service: Gastroenterology    DC RMVL HEATHER CTR VAD W/SUBQ PORT/ CTR/PRPH INSJ Left 5/13/2016    Procedure: REMOVAL VENOUS PORT (PORT-A-CATH); Surgeon: Mati Harvey MD;  Location: [facility];  Service: Colorectal      Family History:     Family History   Problem Relation Age of Onset    Colon cancer Family     Diverticulosis Family         colonic    Cancer Father       Social History:     Social History     Socioeconomic History    Marital status: /Civil Union     Spouse name: None    Number of children: None    Years of education: None    Highest education level: None   Occupational History    None   Tobacco Use    Smoking status: Never    Smokeless tobacco: Never   Vaping Use    Vaping Use: Never used   Substance and Sexual Activity    Alcohol use: Yes     Comment: social    Drug use: No    Sexual activity: Not Currently     Partners: Female   Other Topics Concern    None   Social History Narrative    No advance directives     Social Determinants of Health     Financial Resource Strain: Low Risk  (11/7/2023)    Overall Financial Resource Strain (CARDIA)     Difficulty of Paying Living Expenses: Not very hard   Food Insecurity: Not on file   Transportation Needs: No Transportation Needs (11/7/2023)    PRAPARE - Transportation     Lack of Transportation (Medical): No     Lack of Transportation (Non-Medical):  No   Physical Activity: Unknown (8/31/2022)    Exercise Vital Sign     Days of Exercise per Week: 0 days     Minutes of Exercise per Session: Not on file   Stress: No Stress Concern Present (9/23/2022)    [facility]     Feeling of Stress : Only a little   Social Connections: Not on file   Intimate Partner Violence: Not on file   Housing Stability: Not on file      Medications and Allergies:     Current Outpatient Medications   Medication Sig Dispense Refill    albuterol (Ventolin HFA) 90 mcg/act inhaler Inhale 2 puffs every 6 (six) hours as needed for wheezing 18 g 5    aspirin 81 MG tablet Take 81 mg by mouth daily. atorvastatin (LIPITOR) 40 mg tablet Take 1 tablet (40 mg total) by mouth daily 90 tablet 3    betamethasone, augmented, (DIPROLENE) 0.05 % ointment Apply topically 2 (two) times a day To psoriasis till clear 50 g 3    calcipotriene (DOVONOX) 0.005 % ointment Apply topically 2 (two) times a day 60 g 3    enalapril (VASOTEC) 10 mg tablet Take 1 tablet (10 mg total) by mouth 2 (two) times a day 180 tablet 3    levocetirizine (XYZAL) 5 MG tablet Take 5 mg by mouth daily       metoprolol succinate (TOPROL-XL) 25 mg 24 hr tablet TAKE ONE TABLET BY MOUTH EVERY DAY 90 tablet 3    montelukast (SINGULAIR) 10 mg tablet TAKE ONE TABLET BY MOUTH EVERY DAY AT BEDTIME 30 tablet 3    nitroglycerin (NITROSTAT) 0.4 mg SL tablet Place 1 tablet (0.4 mg total) under the tongue every 5 (five) minutes as needed for chest pain 30 tablet 1    omeprazole (PriLOSEC) 20 mg delayed release capsule Take 1 capsule (20 mg total) by mouth daily 30 capsule 2    PARoxetine (PAXIL) 30 mg tablet Take 1 tablet (30 mg total) by mouth daily 30 tablet 5     No current facility-administered medications for this visit.      Allergies   Allergen Reactions    Penicillins Hives and Other (See Comments)      Immunizations:     Immunization History   Administered Date(s) Administered    COVID-19 MODERNA VACC 0.5 ML IM 11/20/2021, 12/18/2021    Influenza, seasonal, injectable 1950    Pneumococcal Polysaccharide PPV23 1950    Tdap 1950    Zoster 1950      Health Maintenance:         Topic Date Due    Hepatitis C Screening  Completed         Topic Date Due    Pneumococcal Vaccine: 65+ Years (1 - PCV) 01/01/1956    COVID-19 Vaccine (3 - Moderna series) 02/12/2022    Influenza Vaccine (1) 09/01/2023      Medicare Screening Tests and Risk Assessments:     Annamaria Ng is here for his Subsequent Wellness visit. Last Medicare Wellness visit information reviewed, patient interviewed and updates made to the record today. Health Risk Assessment:   Patient rates overall health as very good. Patient feels that their physical health rating is same. Patient is satisfied with their life. Eyesight was rated as slightly worse. Hearing was rated as slightly worse. Patient feels that their emotional and mental health rating is same. Patients states they are never, rarely angry. Patient states they are sometimes unusually tired/fatigued. Pain experienced in the last 7 days has been none. Patient states that he has experienced weight loss or gain in last 6 months. Depression Screening:   PHQ-9 Score: 1      Fall Risk Screening: In the past year, patient has experienced: no history of falling in past year      Home Safety:  Patient does not have trouble with stairs inside or outside of their home. Patient has working smoke alarms and has working carbon monoxide detector. Home safety hazards include: none. Nutrition:   Current diet is Regular. Medications:   Patient is not currently taking any over-the-counter supplements. Patient is able to manage medications. Activities of Daily Living (ADLs)/Instrumental Activities of Daily Living (IADLs):   Walk and transfer into and out of bed and chair?: Yes  Dress and groom yourself?: Yes    Bathe or shower yourself?: Yes    Feed yourself?  Yes  Do your laundry/housekeeping?: Yes  Manage your money, pay your bills and track your expenses?: Yes  Make your own meals?: Yes    Do your own shopping?: Yes    Previous Hospitalizations:   Any hospitalizations or ED visits within the last 12 months?: No      Advance Care Planning:   Living will: No    Durable POA for healthcare: No    Advanced directive: No    Advanced directive counseling given: Yes      PREVENTIVE SCREENINGS      Cardiovascular Screening:    General: History Lipid Disorder and Risks and Benefits Discussed    Due for: Lipid Panel      Diabetes Screening:     General: History Diabetes and Risks and Benefits Discussed    Due for: Blood Glucose      Colorectal Cancer Screening:     General: History Colorectal Cancer and Screening Not Indicated      Prostate Cancer Screening:    General: Screening Not Indicated      Osteoporosis Screening:    General: Screening Not Indicated      Abdominal Aortic Aneurysm (AAA) Screening:    Risk factors include: age between 70-75 yo        General: Screening Not Indicated      Lung Cancer Screening:     General: Screening Not Indicated      Hepatitis C Screening:    General: Screening Current    Screening, Brief Intervention, and Referral to Treatment (SBIRT)    Screening  Typical number of drinks in a day: 1  Typical number of drinks in a week: 4  Interpretation: Low risk drinking behavior. AUDIT-C Screenin) How often did you have a drink containing alcohol in the past year? 2 to 4 times a month  2) How many drinks did you have on a typical day when you were drinking in the past year? 1 to 2  3) How often did you have 6 or more drinks on one occasion in the past year? never    AUDIT-C Score: 2  Interpretation: Score 0-3 (male): Negative screen for alcohol misuse    Single Item Drug Screening:  How often have you used an illegal drug (including marijuana) or a prescription medication for non-medical reasons in the past year? never    Single Item Drug Screen Score: 0  Interpretation: Negative screen for possible drug use disorder    No results found.      Physical Exam:     /72 (BP Location: Left arm, Patient Position: Sitting, Cuff Size: Large)   Pulse 60   Resp 17   Ht 5' 10" (1.778 m)   Wt 95 kg (209 lb 6.4 oz)   SpO2 97%   BMI 30.05 kg/m²     Physical Exam  Vitals and nursing note reviewed. Constitutional:       General: He is awake. Appearance: Normal appearance. He is well-developed, well-groomed and overweight. HENT:      Head: Normocephalic and atraumatic. Right Ear: Hearing and external ear normal.      Left Ear: Hearing and external ear normal.      Nose: Nose normal.      Mouth/Throat:      Lips: Pink. Mouth: Mucous membranes are moist.   Eyes:      General: Lids are normal. Vision grossly intact. Gaze aligned appropriately. Conjunctiva/sclera: Conjunctivae normal.   Neck:      Vascular: No carotid bruit. Trachea: Trachea and phonation normal.   Cardiovascular:      Rate and Rhythm: Normal rate and regular rhythm. Pulses: no weak pulses          Dorsalis pedis pulses are 2+ on the right side and 2+ on the left side. Posterior tibial pulses are 2+ on the right side and 2+ on the left side. Heart sounds: Normal heart sounds, S1 normal and S2 normal. No murmur heard. No friction rub. No gallop. Pulmonary:      Effort: Pulmonary effort is normal.      Breath sounds: Normal breath sounds and air entry. No decreased breath sounds, wheezing, rhonchi or rales. Abdominal:      General: Abdomen is protuberant. Musculoskeletal:      Cervical back: Neck supple. Right lower leg: No edema. Left lower leg: No edema. Feet:      Right foot:      Skin integrity: No ulcer, skin breakdown, erythema, warmth, callus or dry skin. Left foot:      Skin integrity: No ulcer, skin breakdown, erythema, warmth, callus or dry skin. Skin:     General: Skin is warm. Capillary Refill: Capillary refill takes less than 2 seconds. Neurological:      Mental Status: He is alert.    Psychiatric:         Attention and Perception: Attention and perception normal.         Mood and Affect: Mood and affect normal.         Speech: Speech normal.         Behavior: Behavior normal. Behavior is cooperative. Thought Content: Thought content normal.         Cognition and Memory: Cognition and memory normal.         Judgment: Judgment normal.      Diabetic Foot Exam    Patient's shoes and socks removed. Right Foot/Ankle   Right Foot Inspection  Skin Exam: skin normal and skin intact. No dry skin, no warmth, no callus, no erythema, no maceration, no abnormal color, no pre-ulcer, no ulcer and no callus. Toe Exam: ROM and strength within normal limits. Sensory   Monofilament testing: intact    Vascular  Capillary refills: < 3 seconds  The right DP pulse is 2+. The right PT pulse is 2+. Left Foot/Ankle  Left Foot Inspection  Skin Exam: skin normal and skin intact. No dry skin, no warmth, no erythema, no maceration, normal color, no pre-ulcer, no ulcer and no callus. Toe Exam: ROM and strength within normal limits. Sensory   Monofilament testing: intact    Vascular  Capillary refills: < 3 seconds  The left DP pulse is 2+. The left PT pulse is 2+.      Assign Risk Category  No deformity present  No loss of protective sensation  No weak pulses  Risk: 0      Amparo Kent PA-C

## 2023-11-08 NOTE — ASSESSMENT & PLAN NOTE
Follows closely with cardiology. Denies any chest pain, leg swelling, palpitations at this time. Blood pressure is not in goal at this time. Will have lipid panel done in next few days.

## 2023-11-08 NOTE — ASSESSMENT & PLAN NOTE
Currently on atorvastatin 40 mg daily. Will recheck lipid panel. Limits fried foods, fatty foods, fast foods in his diet.

## 2023-11-08 NOTE — ASSESSMENT & PLAN NOTE
He notes that he becomes wheezy sometimes when he gets out of the shower or during the summer. He mostly does not notice it, but his wife does. He denies any shortness of breath or history of asthma or lung disease. He does have seasonal allergies. May be related to allergies or asthma. Albuterol inhaler sent, discussed if this becomes more bothersome or he starts having SOB with it to let me know.

## 2023-11-20 ENCOUNTER — APPOINTMENT (OUTPATIENT)
Dept: LAB | Facility: CLINIC | Age: 73
End: 2023-11-20
Payer: MEDICARE

## 2023-11-20 DIAGNOSIS — E11.9 DIABETES MELLITUS IN REMISSION (HCC): ICD-10-CM

## 2023-11-20 LAB
ALBUMIN SERPL BCP-MCNC: 4.3 G/DL (ref 3.5–5)
ALP SERPL-CCNC: 65 U/L (ref 34–104)
ALT SERPL W P-5'-P-CCNC: 27 U/L (ref 7–52)
ANION GAP SERPL CALCULATED.3IONS-SCNC: 8 MMOL/L
AST SERPL W P-5'-P-CCNC: 22 U/L (ref 13–39)
BASOPHILS # BLD AUTO: 0.05 THOUSANDS/ÂΜL (ref 0–0.1)
BASOPHILS NFR BLD AUTO: 1 % (ref 0–1)
BILIRUB SERPL-MCNC: 0.89 MG/DL (ref 0.2–1)
BUN SERPL-MCNC: 8 MG/DL (ref 5–25)
CALCIUM SERPL-MCNC: 9.4 MG/DL (ref 8.4–10.2)
CHLORIDE SERPL-SCNC: 106 MMOL/L (ref 96–108)
CHOLEST SERPL-MCNC: 138 MG/DL
CO2 SERPL-SCNC: 33 MMOL/L (ref 21–32)
CREAT SERPL-MCNC: 0.84 MG/DL (ref 0.6–1.3)
CREAT UR-MCNC: 182.5 MG/DL
EOSINOPHIL # BLD AUTO: 0.43 THOUSAND/ÂΜL (ref 0–0.61)
EOSINOPHIL NFR BLD AUTO: 6 % (ref 0–6)
ERYTHROCYTE [DISTWIDTH] IN BLOOD BY AUTOMATED COUNT: 13.2 % (ref 11.6–15.1)
EST. AVERAGE GLUCOSE BLD GHB EST-MCNC: 126 MG/DL
GFR SERPL CREATININE-BSD FRML MDRD: 85 ML/MIN/1.73SQ M
GLUCOSE P FAST SERPL-MCNC: 111 MG/DL (ref 65–99)
HBA1C MFR BLD: 6 %
HCT VFR BLD AUTO: 45.1 % (ref 36.5–49.3)
HDLC SERPL-MCNC: 34 MG/DL
HGB BLD-MCNC: 15.6 G/DL (ref 12–17)
IMM GRANULOCYTES # BLD AUTO: 0.02 THOUSAND/UL (ref 0–0.2)
IMM GRANULOCYTES NFR BLD AUTO: 0 % (ref 0–2)
LDLC SERPL CALC-MCNC: 54 MG/DL (ref 0–100)
LYMPHOCYTES # BLD AUTO: 1.76 THOUSANDS/ÂΜL (ref 0.6–4.47)
LYMPHOCYTES NFR BLD AUTO: 26 % (ref 14–44)
MCH RBC QN AUTO: 28.6 PG (ref 26.8–34.3)
MCHC RBC AUTO-ENTMCNC: 34.6 G/DL (ref 31.4–37.4)
MCV RBC AUTO: 83 FL (ref 82–98)
MICROALBUMIN UR-MCNC: 15.3 MG/L
MICROALBUMIN/CREAT 24H UR: 8 MG/G CREATININE (ref 0–30)
MONOCYTES # BLD AUTO: 0.6 THOUSAND/ÂΜL (ref 0.17–1.22)
MONOCYTES NFR BLD AUTO: 9 % (ref 4–12)
NEUTROPHILS # BLD AUTO: 4.03 THOUSANDS/ÂΜL (ref 1.85–7.62)
NEUTS SEG NFR BLD AUTO: 58 % (ref 43–75)
NONHDLC SERPL-MCNC: 104 MG/DL
NRBC BLD AUTO-RTO: 0 /100 WBCS
PLATELET # BLD AUTO: 208 THOUSANDS/UL (ref 149–390)
PMV BLD AUTO: 11.1 FL (ref 8.9–12.7)
POTASSIUM SERPL-SCNC: 4.1 MMOL/L (ref 3.5–5.3)
PROT SERPL-MCNC: 6.5 G/DL (ref 6.4–8.4)
RBC # BLD AUTO: 5.45 MILLION/UL (ref 3.88–5.62)
SODIUM SERPL-SCNC: 147 MMOL/L (ref 135–147)
TRIGL SERPL-MCNC: 248 MG/DL
TSH SERPL DL<=0.05 MIU/L-ACNC: 2.87 UIU/ML (ref 0.45–4.5)
WBC # BLD AUTO: 6.89 THOUSAND/UL (ref 4.31–10.16)

## 2023-11-20 PROCEDURE — 36415 COLL VENOUS BLD VENIPUNCTURE: CPT

## 2023-11-20 PROCEDURE — 82043 UR ALBUMIN QUANTITATIVE: CPT

## 2023-11-20 PROCEDURE — 85025 COMPLETE CBC W/AUTO DIFF WBC: CPT

## 2023-11-20 PROCEDURE — 82570 ASSAY OF URINE CREATININE: CPT

## 2023-11-20 PROCEDURE — 83036 HEMOGLOBIN GLYCOSYLATED A1C: CPT

## 2023-11-20 PROCEDURE — 80053 COMPREHEN METABOLIC PANEL: CPT

## 2023-11-20 PROCEDURE — 80061 LIPID PANEL: CPT

## 2023-11-20 PROCEDURE — 84443 ASSAY THYROID STIM HORMONE: CPT

## 2023-11-21 ENCOUNTER — TELEPHONE (OUTPATIENT)
Age: 73
End: 2023-11-21

## 2023-11-21 NOTE — TELEPHONE ENCOUNTER
----- Message from Chay Justin PA-C sent at 11/21/2023  8:08 AM EST -----  Your A1c is 6.0, so try to limit the carbs and sugars, but all of your other labs look great.

## 2023-12-28 DIAGNOSIS — K21.9 GASTROESOPHAGEAL REFLUX DISEASE WITHOUT ESOPHAGITIS: ICD-10-CM

## 2023-12-28 RX ORDER — OMEPRAZOLE 20 MG/1
20 CAPSULE, DELAYED RELEASE ORAL DAILY
Qty: 30 CAPSULE | Refills: 2 | Status: SHIPPED | OUTPATIENT
Start: 2023-12-28

## 2024-01-07 PROBLEM — Z00.00 MEDICARE ANNUAL WELLNESS VISIT, SUBSEQUENT: Status: RESOLVED | Noted: 2023-11-08 | Resolved: 2024-01-07

## 2024-01-16 DIAGNOSIS — E78.2 MIXED HYPERLIPIDEMIA: ICD-10-CM

## 2024-01-16 RX ORDER — ATORVASTATIN CALCIUM 40 MG/1
40 TABLET, FILM COATED ORAL DAILY
Qty: 90 TABLET | Refills: 3 | Status: SHIPPED | OUTPATIENT
Start: 2024-01-16

## 2024-01-22 ENCOUNTER — TELEPHONE (OUTPATIENT)
Age: 74
End: 2024-01-22

## 2024-02-14 ENCOUNTER — OFFICE VISIT (OUTPATIENT)
Dept: CARDIOLOGY CLINIC | Facility: CLINIC | Age: 74
End: 2024-02-14
Payer: MEDICARE

## 2024-02-14 VITALS
HEART RATE: 78 BPM | WEIGHT: 209 LBS | BODY MASS INDEX: 29.92 KG/M2 | SYSTOLIC BLOOD PRESSURE: 170 MMHG | RESPIRATION RATE: 16 BRPM | DIASTOLIC BLOOD PRESSURE: 90 MMHG | OXYGEN SATURATION: 98 % | HEIGHT: 70 IN

## 2024-02-14 DIAGNOSIS — I10 ESSENTIAL HYPERTENSION: ICD-10-CM

## 2024-02-14 DIAGNOSIS — I25.10 CAD S/P PERCUTANEOUS CORONARY ANGIOPLASTY: Primary | ICD-10-CM

## 2024-02-14 DIAGNOSIS — Z98.61 CAD S/P PERCUTANEOUS CORONARY ANGIOPLASTY: Primary | ICD-10-CM

## 2024-02-14 DIAGNOSIS — Z95.1 HX OF CABG: ICD-10-CM

## 2024-02-14 DIAGNOSIS — E78.2 MIXED HYPERLIPIDEMIA: ICD-10-CM

## 2024-02-14 DIAGNOSIS — I51.89 DIASTOLIC DYSFUNCTION: ICD-10-CM

## 2024-02-14 PROCEDURE — 99214 OFFICE O/P EST MOD 30 MIN: CPT | Performed by: INTERNAL MEDICINE

## 2024-02-14 RX ORDER — CHLORTHALIDONE 25 MG/1
25 TABLET ORAL DAILY
Qty: 30 TABLET | Refills: 5 | Status: SHIPPED | OUTPATIENT
Start: 2024-02-14

## 2024-02-14 NOTE — PROGRESS NOTES
CARDIOLOGY OFFICE VISIT  [facility]  [address] [id number]  Tel: [phone]      NAME: Mitchell Bland  AGE: 74 y.o.  SEX: male  : 1950  MRN: [id number]      Chief Complaint:  Chief Complaint   Patient presents with    Follow-up         History of Present Illness:   Patient comes for follow up. States he is doing well from cardiac stand point and denies chest pain / pressure, SOB, palpitations, lightheadedness, syncope, swelling feet, orthopnea, PND, claudication.    CAD s/p PCI in , s/p CABG x2 in  at [facility]  -  States is doing well cardiac wise with no cardiac symptoms.  Taking all medications regularly.  Has not used SL NTG since the last clinic visit.    Essential hypertension, DD -  Has been hypertensive for many years.  Taking medications regularly.  Denies lightheadedness, headache, medication side effects.      Mixed hyperlipidemia -  Has had hyperlipidemia for many years.  Taking statin regularly along with diet control.  Denies myalgia.  PCP closely monitoring the blood work.      Past Medical History:  Past Medical History:   Diagnosis Date    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HCC)     large intestine    Allergic rhinitis     Anemia     Anxiety     Atherosclerosis     CAD (coronary artery disease)     Colitis     Depression     Diabetes mellitus (HCC)     Difficulty breathing     Gastritis     GERD (gastroesophageal reflux disease)     Hepatic cyst     Hyperlipidemia     Hypertension     IFG (impaired fasting glucose)     Myocardial infarction (HCC)     9 YEARS AGO    Psoriasis          Past Surgical History:  Past Surgical History:   Procedure Laterality Date    ANGIOPLASTY      COLON SURGERY      CORONARY ARTERY BYPASS GRAFT      GA COLONOSCOPY FLX DX W/COLLJ SPEC WHEN PFRMD N/A 2017    Procedure: COLONOSCOPY;  Surgeon: Alhaji Amado MD;  Location: [facility];  Service: Gastroenterology    GA RMVL HEATHER CTR  VAD W/SUBQ PORT/ CTR/PRPH INSJ Left 5/13/2016    Procedure: REMOVAL VENOUS PORT (PORT-A-CATH);  Surgeon: LONG Pulliam MD;  [facility]: [facility];  Service: Colorectal         Family History:  Family History   Problem Relation Age of Onset    Colon cancer Family     Diverticulosis Family         colonic    Cancer Father          Social History:  Social History     Socioeconomic History    Marital status: /Civil Union     Spouse name: None    Number of children: None    Years of education: None    Highest education level: None   Occupational History    None   Tobacco Use    Smoking status: Never    Smokeless tobacco: Never   Vaping Use    Vaping status: Never Used   Substance and Sexual Activity    Alcohol use: Yes     Comment: social    Drug use: No    Sexual activity: Not Currently     Partners: Female   Other Topics Concern    None   Social History Narrative    No advance directives     Social Determinants of Health     Financial Resource Strain: Low Risk  (11/7/2023)    Overall Financial Resource Strain (CARDIA)     Difficulty of Paying Living Expenses: Not very hard   Food Insecurity: Not on file   Transportation Needs: No Transportation Needs (11/7/2023)    PRAPARE - Transportation     Lack of Transportation (Medical): No     Lack of Transportation (Non-Medical): No   Physical Activity: Unknown (8/31/2022)    Exercise Vital Sign     Days of Exercise per Week: 0 days     Minutes of Exercise per Session: Not on file   Stress: No Stress Concern Present (9/23/2022)    [employer] - Occupational Stress Questionnaire     Feeling of Stress : Only a little   Social Connections: Not on file   Intimate Partner Violence: Not on file   Housing Stability: Not on file         Active Problems:  Patient Active Problem List   Diagnosis    Colon cancer (HCC)    CAD S/P percutaneous coronary angioplasty    Gastritis    Hypertension    Hyperlipidemia    Hx of CABG    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      Review of Systems:  Constitutional: Denies fever, chills  Eyes: Denies eye redness, eye discharge  ENT: Denies hearing loss, sneezing, nasal discharge, sore throat   Respiratory: Denies cough, expectoration, shortness of breath  Cardiovascular: Denies chest pain, palpitations, lower extremity swelling  Gastrointestinal: Denies abdominal pain, nausea, vomiting, diarrhea  Genito-Urinary: Denies dysuria, incontinence  Musculoskeletal: +back pain  Neurologic: Denies lightheadedness, syncope, headache, seizures  Endocrine: Denies polydipsia, temperature intolerance  Allergy and Immunology: Denies hives, insect bite sensitivity  Hematological and Lymphatic: Denies bleeding problems, swollen glands   Psychological: Denies depression, suicidal ideation, anxiety, panic  Dermatological: Denies pruritus, rash, skin lesion changes      Vitals:  Vitals:    02/14/24 1124   BP: 170/90   Pulse: 78   Resp: 16   SpO2: 98%       Body mass index is 29.99 kg/m².    Weight (last 2 days)       Date/Time Weight    02/14/24 1124 94.8 (209)              Physical Examination:  General: Patient is not in acute distress. Awake, alert, oriented in time, place and person. Responding to commands  Head: Normocephalic. Atraumatic  Eyes: Both pupils normal sized, round and reactive to light. Nonicteric  ENT: Normal external ear canals  Neck: Supple. JVP not raised. Trachea central. No thyromegaly  Lungs: Bilateral bronchovascular breath sounds with no crackles or rhonchi  Chest wall: No tenderness  Cardiovascular: RRR. S1 and S2 normal. No murmur, rub  or gallop  Gastrointestinal: Abdomen soft, nontender. No guarding or rigidity. Liver and spleen not palpable. Bowel sounds present  Neurologic: Patient is awake, alert, oriented in time, place and person. Responding to commands. Moving all extremities  Integumentary:  No skin rash  Lymphatic: No cervical lymphadenopathy  Back: Symmetric. No CVA tenderness  Extremities: No clubbing, cyanosis or edema      Laboratory Results:  CBC with diff:   Lab Results   Component Value Date    WBC 6.89 11/20/2023    WBC 6.1 03/16/2015    RBC 5.45 11/20/2023    RBC 4.91 03/16/2015    HGB 15.6 11/20/2023    HGB 14.7 03/16/2015    HCT 45.1 11/20/2023    HCT 42.0 03/16/2015    MCV 83 11/20/2023    MCV 85 03/16/2015    MCH 28.6 11/20/2023    MCH 29.9 03/16/2015    RDW 13.2 11/20/2023    RDW 12.0 03/16/2015     11/20/2023     03/16/2015       CMP:  Lab Results   Component Value Date    CREATININE 0.84 11/20/2023    CREATININE 1.1 03/16/2015    BUN 8 11/20/2023    BUN 12 03/16/2015     (H) 03/16/2015    K 4.1 11/20/2023    K 4.0 03/16/2015     11/20/2023     (H) 03/16/2015    CO2 33 (H) 11/20/2023    CO2 29.4 03/16/2015    GLUCOSE 104 03/16/2015    PROT 6.8 03/16/2015    ALKPHOS 65 11/20/2023    ALKPHOS 89 03/16/2015    ALT 27 11/20/2023    ALT 43 03/16/2015    AST 22 11/20/2023    AST 23 03/16/2015    BILIDIR 0.19 09/07/2018       Lab Results   Component Value Date    HGBA1C 6.0 (H) 11/20/2023    HGBA1C 5.1 03/16/2015    MG 2.0 04/01/2014    PHOS 2.4 (L) 04/01/2014       Lipid Profile:   Lab Results   Component Value Date    CHOL 129 03/16/2015    CHOL 148 02/12/2014     Lab Results   Component Value Date    HDL 34 (L) 11/20/2023    HDL 31 (L) 08/30/2022    HDL 30 (L) 01/28/2022     Lab Results   Component Value Date    LDLCALC 54 11/20/2023    LDLCALC 65 08/30/2022    LDLCALC 58 01/28/2022     Lab Results   Component Value Date    TRIG 248 (H) 11/20/2023    TRIG 120 08/30/2022    TRIG 193 (H) 01/28/2022          Medications:    Current Outpatient Medications:     albuterol (Ventolin HFA) 90 mcg/act inhaler, Inhale 2 puffs every 6 (six) hours as needed for wheezing, Disp: 18 g, Rfl: 5    aspirin 81 MG tablet, Take 81 mg by mouth daily., Disp: , Rfl:     atorvastatin (LIPITOR) 40 mg tablet, TAKE ONE TABLET BY MOUTH EVERY DAY, Disp: 90 tablet, Rfl: 3    betamethasone, augmented, (DIPROLENE) 0.05 % ointment, Apply topically 2 (two) times a day To psoriasis till clear, Disp: 50 g, Rfl: 3    calcipotriene (DOVONOX) 0.005 % ointment, Apply topically 2 (two) times a day, Disp: 60 g, Rfl: 3    chlorthalidone 25 mg tablet, Take 1 tablet (25 mg total) by mouth daily, Disp: 30 tablet, Rfl: 5    enalapril (VASOTEC) 10 mg tablet, Take 1 tablet (10 mg total) by mouth 2 (two) times a day, Disp: 180 tablet, Rfl: 3    levocetirizine (XYZAL) 5 MG tablet, Take 5 mg by mouth daily , Disp: , Rfl:     metoprolol succinate (TOPROL-XL) 25 mg 24 hr tablet, TAKE ONE TABLET BY MOUTH EVERY DAY, Disp: 90 tablet, Rfl: 3    montelukast (SINGULAIR) 10 mg tablet, TAKE ONE TABLET BY MOUTH EVERY DAY AT BEDTIME, Disp: 30 tablet, Rfl: 3    nitroglycerin (NITROSTAT) 0.4 mg SL tablet, Place 1 tablet (0.4 mg total) under the tongue every 5 (five) minutes as needed for chest pain, Disp: 30 tablet, Rfl: 1    omeprazole (PriLOSEC) 20 mg delayed release capsule, TAKE ONE CAPSULE BY MOUTH EVERY DAY, Disp: 30 capsule, Rfl: 2    PARoxetine (PAXIL) 30 mg tablet, Take 1 tablet (30 mg total) by mouth daily, Disp: 30 tablet, Rfl: 5      Allergies:  Allergies   Allergen Reactions    Penicillins Hives and Other (See Comments)         Assessment and Plan:  1. CAD S/P percutaneous coronary angioplasty  2. Hx of CABG  Stable.  Continue aspirin, statin, beta-blocker, as needed SL NTG    3. Essential hypertension  BP high.  Add chlorthalidone 25 mg daily to your current antihypertensive medication regimen.  Monitor BP at home and call if abnormal  - chlorthalidone 25 mg  tablet; Take 1 tablet (25 mg total) by mouth daily  Dispense: 30 tablet; Refill: 5    4. Diastolic dysfunction  Tight BP control    5. Mixed hyperlipidemia  Continue statin and diet control.  His PCP closely monitors his blood work    Recommend aggressive risk factor modification and therapeutic lifestyle changes.  Low-salt, low-calorie, low-fat, low-cholesterol diet with regular exercise and to optimize weight.  I will defer the ordering and monitoring of necessity lab studies to you, but I am available and happy to review and manage any of the data at your request in the future.    Discussed concepts of atherosclerosis, including signs and symptoms of cardiac disease.    Previous studies were reviewed.    Safety measures were reviewed.  Questions were entertained and answered.  Patient was advised to report any problems requiring medical attention.    Follow-up with PCP and appropriate specialist and lab work as discussed.    Return for follow up visit as scheduled or earlier, if needed.  Thank you for allowing me to participate in the care and evaluation of your patient.  Should you have any questions, please feel free to contact me.      George Esteban MD  2/14/2024,12:07 PM

## 2024-03-08 ENCOUNTER — OFFICE VISIT (OUTPATIENT)
Dept: GASTROENTEROLOGY | Facility: CLINIC | Age: 74
End: 2024-03-08
Payer: COMMERCIAL

## 2024-03-08 VITALS
SYSTOLIC BLOOD PRESSURE: 132 MMHG | HEIGHT: 70 IN | OXYGEN SATURATION: 96 % | BODY MASS INDEX: 29.92 KG/M2 | DIASTOLIC BLOOD PRESSURE: 70 MMHG | WEIGHT: 209 LBS | HEART RATE: 68 BPM

## 2024-03-08 DIAGNOSIS — Z80.0 FAMILY HISTORY OF COLON CANCER: ICD-10-CM

## 2024-03-08 DIAGNOSIS — Z86.010 HISTORY OF COLON POLYPS: ICD-10-CM

## 2024-03-08 DIAGNOSIS — Z85.038 HISTORY OF COLON CANCER: Primary | ICD-10-CM

## 2024-03-08 DIAGNOSIS — C18.9 MALIGNANT NEOPLASM OF COLON, UNSPECIFIED PART OF COLON (HCC): ICD-10-CM

## 2024-03-08 PROCEDURE — 99203 OFFICE O/P NEW LOW 30 MIN: CPT | Performed by: INTERNAL MEDICINE

## 2024-03-08 NOTE — PATIENT INSTRUCTIONS
Scheduled date of colonoscopy (as of today): 3/20/24  Physician performing colonoscopy: Aneudy  Location of colonoscopy: [place]  Bowel prep reviewed with patient: Miralax  Instructions reviewed with patient by: Lina JIMENES  Clearances:

## 2024-03-08 NOTE — LETTER
March 8, 2024     Ethan Camilo MD  [address]  [address]  [facility] [address]    Patient: Mitchell Bland   YOB: 1950   Date of Visit: 3/8/2024       Dear Dr. Camilo:    Thank you for referring Mitchell Bland to me for evaluation. Below are my notes for this consultation.    If you have questions, please do not hesitate to call me. I look forward to following your patient along with you.         Sincerely,        Alhaji Amado MD        CC: No Recipients    Alhaji Amado MD  3/8/2024  7:53 AM  Incomplete  [facility]    Dear Grant,    I had the pleasure of seeing your patient Mitchell Bland in the office today and I thank you for this kind referral.       Chief Complaint: History of colon cancer      HPI:  Mitchell Bland is a 74 y.o. male who presents with personal history of both colon cancer and colon polyps and a family history of colon cancer.  Patient was going for regular colonoscopies.  Last colonoscopy with me looks like it was in 2017 and he was given a 3-year follow-up but did not come for.  Patient currently denies any abdominal pain, change in bowel habits, change in stool caliber, melena, hematochezia, rectal bleeding, tenesmus, or weight loss.  No shortness of breath, chest pain, dizziness, or any other issues.      Review of Systems:   Constitutional: No fever or chills, feels well, no tiredness, no recent weight gain or weight loss.   HENT: No complaints of earache, no hearing loss, no nosebleeds, no nasal discharge, no sore throat, no hoarseness.    Eyes: No complaints of eye pain, no red eyes, no discharge from eyes, no itchy eyes.  Cardiovascular: No complaints of slow heart rate, no fast heart rate, no chest pain, no palpitations, no leg claudication, no lower extremity edema.   Respiratory: No complaints of shortness of breath, no wheezing, no cough, no SOB on exertion, no orthopnea.   Gastrointestinal: As noted in HPI  Genitourinary: No  complaints of dysuria, no incontinence, no hesitancy, no nocturia.   Musculoskeletal: No complaints of arthralgia, no myalgias, no joint swelling or stiffness, no limb pain or swelling.   Neurological: No complaints of headache, no confusion, no convulsions, no numbness or tingling, no dizziness or fainting, no limb weakness, no difficulty walking.    Skin: No complaints of skin rash or skin lesions, no itching, no skin wound, no dry skin.    Hematological/Lymphatic: No complaints of swollen glands, does not bleed easy.   Allergic/Immunologic: No immunocompromised state.  Endocrine:  No complaints of polyuria, no polydipsia.   Psychiatric/Behavioral: is not suicidal, no sleep disturbances, no anxiety or depression, no change in personality, no emotional problems.       Historical Information  Past Medical History:   Diagnosis Date   • 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HCC)     large intestine   • Allergic rhinitis    • Anemia    • Anxiety    • Atherosclerosis    • CAD (coronary artery disease)    • Colitis    • Depression    • Diabetes mellitus (HCC)    • Difficulty breathing    • Gastritis    • GERD (gastroesophageal reflux disease)    • Hepatic cyst    • Hyperlipidemia    • Hypertension    • IFG (impaired fasting glucose)    • Myocardial infarction (HCC)     9 YEARS AGO   • Psoriasis      Past Surgical History:   Procedure Laterality Date   • ANGIOPLASTY     • COLON SURGERY     • CORONARY ARTERY BYPASS GRAFT     • OK COLONOSCOPY FLX DX W/COLLJ SPEC WHEN PFRMD N/A 2/21/2017    Procedure: COLONOSCOPY;  Surgeon: Alhaji Amado MD;  Location: [facility];  Service: Gastroenterology   • [place] RMVL HEATHER CTR VAD W/SUBQ PORT/ CTR/PRPH INSJ Left 5/13/2016    Procedure: REMOVAL VENOUS PORT (PORT-A-CATH);  Surgeon: LONG Pulliam MD;  [facility];  Service: Colorectal     Social History  Social History     Substance and Sexual Activity   Alcohol Use Yes    Comment: social     Social History     Substance and Sexual Activity  "  Drug Use No     Social History     Tobacco Use   Smoking Status Never   Smokeless Tobacco Never     Family History   Problem Relation Age of Onset   • Colon cancer Family    • Diverticulosis Family         colonic   • Cancer Father          Current Medications: has a current medication list which includes the following prescription(s): albuterol, aspirin, atorvastatin, betamethasone (augmented), calcipotriene, chlorthalidone, enalapril, levocetirizine, metoprolol succinate, montelukast, nitroglycerin, omeprazole, and paroxetine.       Vital Signs: /70   Pulse 68   Ht 5' 10\" (1.778 m)   Wt 94.8 kg (209 lb)   SpO2 96%   BMI 29.99 kg/m²     Physical Exam:   Constitutional  General Appearance: No acute distress, well appearing and well nourished  Head  Normocephalic  Eyes  Conjunctivae and lids: No swelling, erythema, or discharge.    Pupils and irises: Equal, round and reactive to light.   Ears, Nose, Mouth, and Throat  External inspection of ears and nose: Normal  Nasal mucosa, septum and turbinates: Normal without edema or erythema/   Oropharynx: Normal with no erythema, edema, exudate or lesions.   Neck  Normal range of motion. Neck supple.   Cardiovascular  Auscultation of the heart: Normal rate and rhythm, normal S1 and S2 without murmurs.  Examination of the extremities for edema and/or varicosities: Normal  Pulmonary/Chest  Respiratory effort: No increased work of breathing or signs of respiratory distress.   Auscultation of lungs: Clear to auscultation, equal breath sounds bilaterally, no wheezes, rales, no rhonchi.   Abdomen  Abdomen: Non-tender, no masses.   Liver and spleen: No hepatomegaly or splenomegaly.   Musculoskeletal  Gait and station: normal.  Digits and Nails: normal without clubbing or cyanosis.  Inspection/palpation of joints, bones, and muscles: Normal  Neurological  No nystagmus or asterixis.   Skin  Skin and subcutaneous tissue: Normal without rashes or lesions. "   Lymphatic  Palpation of the lymph nodes in neck: No lymphadenopathy.   Psychiatric  Orientation to person, place and time: Normal.  Mood and affect: Normal.         Labs:   Lab Results   Component Value Date    ALT 27 11/20/2023    AST 22 11/20/2023    BUN 8 11/20/2023    CALCIUM 9.4 11/20/2023     11/20/2023    CHOL 129 03/16/2015    CO2 33 (H) 11/20/2023    CREATININE 0.84 11/20/2023    HDL 34 (L) 11/20/2023    HCT 45.1 11/20/2023    HGB 15.6 11/20/2023    HGBA1C 6.0 (H) 11/20/2023    MG 2.0 04/01/2014    PHOS 2.4 (L) 04/01/2014     11/20/2023    K 4.1 11/20/2023    PSA 2.2 02/27/2021     (H) 03/16/2015    TRIG 248 (H) 11/20/2023    WBC 6.89 11/20/2023         X-Rays & Procedures:   No orders to display         ______________________________________________________________________      Assessment & Plan:      Diagnoses and all orders for this visit:    History of colon cancer  -     Colonoscopy; Future    History of colon polyps  -     Colonoscopy; Future    Family history of colon cancer  -     Colonoscopy; Future    Malignant neoplasm of colon, unspecified part of colon (HCC)          I have taken the liberty of scheduling the patient for colonoscopy.  I will be happy to inform you of his results and further recommendations.  I like to thank you for allowing me to participate in his care.          With warmest regards,    Alhaji Amado MD, FACG

## 2024-03-08 NOTE — H&P (VIEW-ONLY)
[facility]    Dear Grant,    I had the pleasure of seeing your patient Mitchell Bland in the office today and I thank you for this kind referral.       Chief Complaint: History of colon cancer      HPI:  Mitchell Bland is a 74 y.o. male who presents with personal history of both colon cancer and colon polyps and a family history of colon cancer.  Patient was going for regular colonoscopies.  Last colonoscopy with me looks like it was in 2017 and he was given a 3-year follow-up but did not come for.  Patient currently denies any abdominal pain, change in bowel habits, change in stool caliber, melena, hematochezia, rectal bleeding, tenesmus, or weight loss.  No shortness of breath, chest pain, dizziness, or any other issues.      Review of Systems:   Constitutional: No fever or chills, feels well, no tiredness, no recent weight gain or weight loss.   HENT: No complaints of earache, no hearing loss, no nosebleeds, no nasal discharge, no sore throat, no hoarseness.    Eyes: No complaints of eye pain, no red eyes, no discharge from eyes, no itchy eyes.  Cardiovascular: No complaints of slow heart rate, no fast heart rate, no chest pain, no palpitations, no leg claudication, no lower extremity edema.   Respiratory: No complaints of shortness of breath, no wheezing, no cough, no SOB on exertion, no orthopnea.   Gastrointestinal: As noted in HPI  Genitourinary: No complaints of dysuria, no incontinence, no hesitancy, no nocturia.   Musculoskeletal: No complaints of arthralgia, no myalgias, no joint swelling or stiffness, no limb pain or swelling.   Neurological: No complaints of headache, no confusion, no convulsions, no numbness or tingling, no dizziness or fainting, no limb weakness, no difficulty walking.    Skin: No complaints of skin rash or skin lesions, no itching, no skin wound, no dry skin.    Hematological/Lymphatic: No complaints of swollen glands, does not bleed easy.    Allergic/Immunologic: No immunocompromised state.  Endocrine:  No complaints of polyuria, no polydipsia.   Psychiatric/Behavioral: is not suicidal, no sleep disturbances, no anxiety or depression, no change in personality, no emotional problems.       Historical Information   Past Medical History:   Diagnosis Date    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HCC)     large intestine    Allergic rhinitis     Anemia     Anxiety     Atherosclerosis     CAD (coronary artery disease)     Colitis     Depression     Diabetes mellitus (HCC)     Difficulty breathing     Gastritis     GERD (gastroesophageal reflux disease)     Hepatic cyst     Hyperlipidemia     Hypertension     IFG (impaired fasting glucose)     Myocardial infarction (HCC)     9 YEARS AGO    Psoriasis      Past Surgical History:   Procedure Laterality Date    ANGIOPLASTY      COLON SURGERY      CORONARY ARTERY BYPASS GRAFT      AR COLONOSCOPY FLX DX W/COLLJ SPEC WHEN PFRMD N/A 2/21/2017    Procedure: COLONOSCOPY;  Surgeon: Alhaji Amado MD;  Location: [facility];  Service: Gastroenterology    AR RMVL HEATHER CTR VAD W/SUBQ PORT/ CTR/PRPH INSJ Left 5/13/2016    Procedure: REMOVAL VENOUS PORT (PORT-A-CATH);  Surgeon: LONG Pulliam MD;  Location: [facility];  Service: Colorectal     Social History   Social History     Substance and Sexual Activity   Alcohol Use Yes    Comment: social     Social History     Substance and Sexual Activity   Drug Use No     Social History     Tobacco Use   Smoking Status Never   Smokeless Tobacco Never     Family History   Problem Relation Age of Onset    Colon cancer Family     Diverticulosis Family         colonic    Cancer Father          Current Medications: has a current medication list which includes the following prescription(s): albuterol, aspirin, atorvastatin, betamethasone (augmented), calcipotriene, chlorthalidone, enalapril, levocetirizine, metoprolol succinate, montelukast, nitroglycerin, omeprazole, and paroxetine.       Vital Signs:  "/70   Pulse 68   Ht 5' 10\" (1.778 m)   Wt 94.8 kg (209 lb)   SpO2 96%   BMI 29.99 kg/m²     Physical Exam:   Constitutional  General Appearance: No acute distress, well appearing and well nourished  Head  Normocephalic  Eyes  Conjunctivae and lids: No swelling, erythema, or discharge.    Pupils and irises: Equal, round and reactive to light.   Ears, Nose, Mouth, and Throat  External inspection of ears and nose: Normal  Nasal mucosa, septum and turbinates: Normal without edema or erythema/   Oropharynx: Normal with no erythema, edema, exudate or lesions.   Neck  Normal range of motion. Neck supple.   Cardiovascular  Auscultation of the heart: Normal rate and rhythm, normal S1 and S2 without murmurs.  Examination of the extremities for edema and/or varicosities: Normal  Pulmonary/Chest  Respiratory effort: No increased work of breathing or signs of respiratory distress.   Auscultation of lungs: Clear to auscultation, equal breath sounds bilaterally, no wheezes, rales, no rhonchi.   Abdomen  Abdomen: Non-tender, no masses.   Liver and spleen: No hepatomegaly or splenomegaly.   Musculoskeletal  Gait and station: normal.  Digits and Nails: normal without clubbing or cyanosis.  Inspection/palpation of joints, bones, and muscles: Normal  Neurological  No nystagmus or asterixis.   Skin  Skin and subcutaneous tissue: Normal without rashes or lesions.   Lymphatic  Palpation of the lymph nodes in neck: No lymphadenopathy.   Psychiatric  Orientation to person, place and time: Normal.  Mood and affect: Normal.         Labs:   Lab Results   Component Value Date    ALT 27 11/20/2023    AST 22 11/20/2023    BUN 8 11/20/2023    CALCIUM 9.4 11/20/2023     11/20/2023    CHOL 129 03/16/2015    CO2 33 (H) 11/20/2023    CREATININE 0.84 11/20/2023    HDL 34 (L) 11/20/2023    HCT 45.1 11/20/2023    HGB 15.6 11/20/2023    HGBA1C 6.0 (H) 11/20/2023    MG 2.0 04/01/2014    PHOS 2.4 (L) 04/01/2014     11/20/2023    K " 4.1 11/20/2023    PSA 2.2 02/27/2021     (H) 03/16/2015    TRIG 248 (H) 11/20/2023    WBC 6.89 11/20/2023         X-Rays & Procedures:   No orders to display         ______________________________________________________________________      Assessment & Plan:      Diagnoses and all orders for this visit:    History of colon cancer  -     Colonoscopy; Future    History of colon polyps  -     Colonoscopy; Future    Family history of colon cancer  -     Colonoscopy; Future    Malignant neoplasm of colon, unspecified part of colon (HCC)          I have taken the liberty of scheduling the patient for colonoscopy.  I will be happy to inform you of his results and further recommendations.  I like to thank you for allowing me to participate in his care.          With warmest regards,    Alhaji Amado MD, FACG

## 2024-03-08 NOTE — PROGRESS NOTES
[facility]    Dear Grant,    I had the pleasure of seeing your patient Mitchell Bland in the office today and I thank you for this kind referral.       Chief Complaint: History of colon cancer      HPI:  Mitchell Bland is a 74 y.o. male who presents with personal history of both colon cancer and colon polyps and a family history of colon cancer.  Patient was going for regular colonoscopies.  Last colonoscopy with me looks like it was in 2017 and he was given a 3-year follow-up but did not come for.  Patient currently denies any abdominal pain, change in bowel habits, change in stool caliber, melena, hematochezia, rectal bleeding, tenesmus, or weight loss.  No shortness of breath, chest pain, dizziness, or any other issues.      Review of Systems:   Constitutional: No fever or chills, feels well, no tiredness, no recent weight gain or weight loss.   HENT: No complaints of earache, no hearing loss, no nosebleeds, no nasal discharge, no sore throat, no hoarseness.    Eyes: No complaints of eye pain, no red eyes, no discharge from eyes, no itchy eyes.  Cardiovascular: No complaints of slow heart rate, no fast heart rate, no chest pain, no palpitations, no leg claudication, no lower extremity edema.   Respiratory: No complaints of shortness of breath, no wheezing, no cough, no SOB on exertion, no orthopnea.   Gastrointestinal: As noted in HPI  Genitourinary: No complaints of dysuria, no incontinence, no hesitancy, no nocturia.   Musculoskeletal: No complaints of arthralgia, no myalgias, no joint swelling or stiffness, no limb pain or swelling.   Neurological: No complaints of headache, no confusion, no convulsions, no numbness or tingling, no dizziness or fainting, no limb weakness, no difficulty walking.    Skin: No complaints of skin rash or skin lesions, no itching, no skin wound, no dry skin.    Hematological/Lymphatic: No complaints of swollen glands, does not bleed easy.    Allergic/Immunologic: No immunocompromised state.  Endocrine:  No complaints of polyuria, no polydipsia.   Psychiatric/Behavioral: is not suicidal, no sleep disturbances, no anxiety or depression, no change in personality, no emotional problems.       Historical Information   Past Medical History:   Diagnosis Date    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HCC)     large intestine    Allergic rhinitis     Anemia     Anxiety     Atherosclerosis     CAD (coronary artery disease)     Colitis     Depression     Diabetes mellitus (HCC)     Difficulty breathing     Gastritis     GERD (gastroesophageal reflux disease)     Hepatic cyst     Hyperlipidemia     Hypertension     IFG (impaired fasting glucose)     Myocardial infarction (HCC)     9 YEARS AGO    Psoriasis      Past Surgical History:   Procedure Laterality Date    ANGIOPLASTY      COLON SURGERY      CORONARY ARTERY BYPASS GRAFT      FL COLONOSCOPY FLX DX W/COLLJ SPEC WHEN PFRMD N/A 2/21/2017    Procedure: COLONOSCOPY;  Surgeon: Alhaji Amado MD;  Location: [facility];  Service: Gastroenterology    FL RMVL HEATHER CTR VAD W/SUBQ PORT/ CTR/PRPH INSJ Left 5/13/2016    Procedure: REMOVAL VENOUS PORT (PORT-A-CATH);  Surgeon: LONG Pulliam MD;  [facility];  Service: Colorectal     Social History   Social History     Substance and Sexual Activity   Alcohol Use Yes    Comment: social     Social History     Substance and Sexual Activity   Drug Use No     Social History     Tobacco Use   Smoking Status Never   Smokeless Tobacco Never     Family History   Problem Relation Age of Onset    Colon cancer Family     Diverticulosis Family         colonic    Cancer Father          Current Medications: has a current medication list which includes the following prescription(s): albuterol, aspirin, atorvastatin, betamethasone (augmented), calcipotriene, chlorthalidone, enalapril, levocetirizine, metoprolol succinate, montelukast, nitroglycerin, omeprazole, and paroxetine.       Vital Signs:  "/70   Pulse 68   Ht 5' 10\" (1.778 m)   Wt 94.8 kg (209 lb)   SpO2 96%   BMI 29.99 kg/m²     Physical Exam:   Constitutional  General Appearance: No acute distress, well appearing and well nourished  Head  Normocephalic  Eyes  Conjunctivae and lids: No swelling, erythema, or discharge.    Pupils and irises: Equal, round and reactive to light.   Ears, Nose, Mouth, and Throat  External inspection of ears and nose: Normal  Nasal mucosa, septum and turbinates: Normal without edema or erythema/   Oropharynx: Normal with no erythema, edema, exudate or lesions.   Neck  Normal range of motion. Neck supple.   Cardiovascular  Auscultation of the heart: Normal rate and rhythm, normal S1 and S2 without murmurs.  Examination of the extremities for edema and/or varicosities: Normal  Pulmonary/Chest  Respiratory effort: No increased work of breathing or signs of respiratory distress.   Auscultation of lungs: Clear to auscultation, equal breath sounds bilaterally, no wheezes, rales, no rhonchi.   Abdomen  Abdomen: Non-tender, no masses.   Liver and spleen: No hepatomegaly or splenomegaly.   Musculoskeletal  Gait and station: normal.  Digits and Nails: normal without clubbing or cyanosis.  Inspection/palpation of joints, bones, and muscles: Normal  Neurological  No nystagmus or asterixis.   Skin  Skin and subcutaneous tissue: Normal without rashes or lesions.   Lymphatic  Palpation of the lymph nodes in neck: No lymphadenopathy.   Psychiatric  Orientation to person, place and time: Normal.  Mood and affect: Normal.         Labs:   Lab Results   Component Value Date    ALT 27 11/20/2023    AST 22 11/20/2023    BUN 8 11/20/2023    CALCIUM 9.4 11/20/2023     11/20/2023    CHOL 129 03/16/2015    CO2 33 (H) 11/20/2023    CREATININE 0.84 11/20/2023    HDL 34 (L) 11/20/2023    HCT 45.1 11/20/2023    HGB 15.6 11/20/2023    HGBA1C 6.0 (H) 11/20/2023    MG 2.0 04/01/2014    PHOS 2.4 (L) 04/01/2014     11/20/2023    K " 4.1 11/20/2023    PSA 2.2 02/27/2021     (H) 03/16/2015    TRIG 248 (H) 11/20/2023    WBC 6.89 11/20/2023         X-Rays & Procedures:   No orders to display         ______________________________________________________________________      Assessment & Plan:      Diagnoses and all orders for this visit:    History of colon cancer  -     Colonoscopy; Future    History of colon polyps  -     Colonoscopy; Future    Family history of colon cancer  -     Colonoscopy; Future    Malignant neoplasm of colon, unspecified part of colon (HCC)          I have taken the liberty of scheduling the patient for colonoscopy.  I will be happy to inform you of his results and further recommendations.  I like to thank you for allowing me to participate in his care.          With warmest regards,    Alhaji Amado MD, FACG

## 2024-03-20 ENCOUNTER — ANESTHESIA (OUTPATIENT)
Dept: GASTROENTEROLOGY | Facility: HOSPITAL | Age: 74
End: 2024-03-20

## 2024-03-20 ENCOUNTER — HOSPITAL ENCOUNTER (OUTPATIENT)
Dept: GASTROENTEROLOGY | Facility: HOSPITAL | Age: 74
Setting detail: OUTPATIENT SURGERY
Discharge: HOME | End: 2024-03-20
Attending: INTERNAL MEDICINE
Payer: COMMERCIAL

## 2024-03-20 ENCOUNTER — ANESTHESIA EVENT (OUTPATIENT)
Dept: GASTROENTEROLOGY | Facility: HOSPITAL | Age: 74
End: 2024-03-20

## 2024-03-20 ENCOUNTER — PREP FOR PROCEDURE (OUTPATIENT)
Age: 74
End: 2024-03-20

## 2024-03-20 VITALS
RESPIRATION RATE: 20 BRPM | DIASTOLIC BLOOD PRESSURE: 75 MMHG | OXYGEN SATURATION: 98 % | HEART RATE: 65 BPM | HEIGHT: 70 IN | WEIGHT: 204.15 LBS | BODY MASS INDEX: 29.23 KG/M2 | SYSTOLIC BLOOD PRESSURE: 164 MMHG | TEMPERATURE: 97.9 F

## 2024-03-20 DIAGNOSIS — Z85.038 HISTORY OF COLON CANCER: ICD-10-CM

## 2024-03-20 DIAGNOSIS — Z86.010 HISTORY OF COLON POLYPS: ICD-10-CM

## 2024-03-20 DIAGNOSIS — Z80.0 FAMILY HISTORY OF COLON CANCER: ICD-10-CM

## 2024-03-20 PROBLEM — Z98.61 HISTORY OF PTCA: Status: ACTIVE | Noted: 2024-03-20

## 2024-03-20 PROCEDURE — 45385 COLONOSCOPY W/LESION REMOVAL: CPT | Performed by: INTERNAL MEDICINE

## 2024-03-20 PROCEDURE — 88305 TISSUE EXAM BY PATHOLOGIST: CPT | Performed by: STUDENT IN AN ORGANIZED HEALTH CARE EDUCATION/TRAINING PROGRAM

## 2024-03-20 RX ORDER — PROPOFOL 10 MG/ML
INJECTION, EMULSION INTRAVENOUS AS NEEDED
Status: DISCONTINUED | OUTPATIENT
Start: 2024-03-20 | End: 2024-03-20

## 2024-03-20 RX ORDER — SODIUM CHLORIDE, SODIUM LACTATE, POTASSIUM CHLORIDE, CALCIUM CHLORIDE 600; 310; 30; 20 MG/100ML; MG/100ML; MG/100ML; MG/100ML
INJECTION, SOLUTION INTRAVENOUS CONTINUOUS PRN
Status: DISCONTINUED | OUTPATIENT
Start: 2024-03-20 | End: 2024-03-20

## 2024-03-20 RX ORDER — LIDOCAINE HYDROCHLORIDE 20 MG/ML
INJECTION, SOLUTION EPIDURAL; INFILTRATION; INTRACAUDAL; PERINEURAL AS NEEDED
Status: DISCONTINUED | OUTPATIENT
Start: 2024-03-20 | End: 2024-03-20

## 2024-03-20 RX ADMIN — LIDOCAINE HYDROCHLORIDE 100 MG: 20 INJECTION, SOLUTION EPIDURAL; INFILTRATION; INTRACAUDAL; PERINEURAL at 08:22

## 2024-03-20 RX ADMIN — PROPOFOL 30 MG: 10 INJECTION, EMULSION INTRAVENOUS at 08:28

## 2024-03-20 RX ADMIN — PROPOFOL 20 MG: 10 INJECTION, EMULSION INTRAVENOUS at 08:30

## 2024-03-20 RX ADMIN — SODIUM CHLORIDE, SODIUM LACTATE, POTASSIUM CHLORIDE, AND CALCIUM CHLORIDE: .6; .31; .03; .02 INJECTION, SOLUTION INTRAVENOUS at 08:18

## 2024-03-20 RX ADMIN — PROPOFOL 20 MG: 10 INJECTION, EMULSION INTRAVENOUS at 08:26

## 2024-03-20 RX ADMIN — PROPOFOL 30 MG: 10 INJECTION, EMULSION INTRAVENOUS at 08:25

## 2024-03-20 RX ADMIN — PROPOFOL 20 MG: 10 INJECTION, EMULSION INTRAVENOUS at 08:23

## 2024-03-20 RX ADMIN — PROPOFOL 80 MG: 10 INJECTION, EMULSION INTRAVENOUS at 08:22

## 2024-03-20 NOTE — ANESTHESIA POSTPROCEDURE EVALUATION
Post-Op Assessment Note    CV Status:  Stable    Pain management: adequate       Mental Status:  Sleepy and arousable   Hydration Status:  Euvolemic   PONV Controlled:  Controlled   Airway Patency:  Patent     Post Op Vitals Reviewed: Yes    No anethesia notable event occurred.    Staff: CRNA               /62 (03/20/24 0835)    Temp 97.9 °F (36.6 °C) (03/20/24 0835)    Pulse 78 (03/20/24 0835)   Resp 18 (03/20/24 0835)    SpO2 97 % (03/20/24 0835)

## 2024-03-20 NOTE — INTERVAL H&P NOTE
H&P reviewed. After examining the patient I find no changes in the patients condition since the H&P had been written.    Vitals:    03/20/24 0743   BP: (!) 175/97   Pulse: 72   Resp: 16   Temp: (!) 97.3 °F (36.3 °C)   SpO2: 98%

## 2024-03-20 NOTE — ANESTHESIA PREPROCEDURE EVALUATION
Procedure:  COLONOSCOPY    Relevant Problems   ANESTHESIA (within normal limits)      CARDIO  Last dose of metoprolol yesterday AM   (+) CAD S/P percutaneous coronary angioplasty (Stenting x2, most recently 2008)   (+) History of PTCA   (+) Hyperlipidemia   (+) Hypertension      ENDO (within normal limits)      GI/HEPATIC  Confirmed NPO appropriate  S/p bowel prep   (+) Colon cancer (HCC)      /RENAL (within normal limits)      HEMATOLOGY (within normal limits)      MUSCULOSKELETAL (within normal limits)      NEURO/PSYCH   (+) Major depressive disorder with single episode, in full remission (HCC)      PULMONARY   (-) Smoking   (-) URI (upper respiratory infection)      Endocrine   (+) Diabetes mellitus in remission (HCC)      Surgery/Wound/Pain   (+) Hx of CABG        Physical Exam    Airway  Comment: Full beard  Mallampati score: II  TM Distance: >3 FB  Neck ROM: full     Dental        Cardiovascular  Rhythm: regular, Rate: normal    Pulmonary   Breath sounds clear to auscultation    Other Findings        Anesthesia Plan  ASA Score- 3     Anesthesia Type- IV sedation with anesthesia with ASA Monitors.         Additional Monitors:     Airway Plan:     Comment: I discussed the risks and benefits of IV sedation anesthesia including the possibility of the need to convert to general anesthesia and the potential risk of awareness.  The patient was given the opportunity to ask questions, which were answered..       Plan Factors-Exercise tolerance (METS): >4 METS.    Chart reviewed. EKG reviewed.       Patient is not a current smoker.              Induction- intravenous.    Postoperative Plan-     Informed Consent- Anesthetic plan and risks discussed with patient.  I personally reviewed this patient with the CRNA. Discussed and agreed on the Anesthesia Plan with the CRNA..            TTE 1/28/20:  SUMMARY  LEFT VENTRICLE:  Systolic function was normal. Ejection fraction was estimated to be 60 %.  There were no regional  wall motion abnormalities.  Concentric hypertrophy was present.  Features were consistent with a pseudonormal left ventricular filling pattern, with concomitant abnormal relaxation and increased filling pressure (grade 2 diastolic dysfunction).  RIGHT VENTRICLE:  The size was normal.  Systolic function was normal.  LEFT ATRIUM:  The atrium was mildly dilated.  MITRAL VALVE:  There was mild regurgitation.  TRICUSPID VALVE:  There was mild regurgitation.  Pulmonary artery systolic pressure was at the upper limits of normal.  PULMONIC VALVE:  There was trace regurgitation.      Lab Results   Component Value Date    HGBA1C 6.0 (H) 11/20/2023

## 2024-03-22 PROCEDURE — 88305 TISSUE EXAM BY PATHOLOGIST: CPT | Performed by: STUDENT IN AN ORGANIZED HEALTH CARE EDUCATION/TRAINING PROGRAM

## 2024-04-29 ENCOUNTER — RA CDI HCC (OUTPATIENT)
Dept: OTHER | Facility: HOSPITAL | Age: 74
End: 2024-04-29

## 2024-04-30 ENCOUNTER — APPOINTMENT (OUTPATIENT)
Dept: LAB | Facility: HOSPITAL | Age: 74
End: 2024-04-30
Payer: COMMERCIAL

## 2024-04-30 ENCOUNTER — HOSPITAL ENCOUNTER (OUTPATIENT)
Dept: RADIOLOGY | Facility: HOSPITAL | Age: 74
Discharge: HOME | End: 2024-04-30
Payer: COMMERCIAL

## 2024-04-30 ENCOUNTER — OFFICE VISIT (OUTPATIENT)
Age: 74
End: 2024-04-30
Payer: COMMERCIAL

## 2024-04-30 VITALS
SYSTOLIC BLOOD PRESSURE: 134 MMHG | HEIGHT: 70 IN | WEIGHT: 208 LBS | BODY MASS INDEX: 29.78 KG/M2 | DIASTOLIC BLOOD PRESSURE: 78 MMHG

## 2024-04-30 DIAGNOSIS — Z98.61 CAD S/P PERCUTANEOUS CORONARY ANGIOPLASTY: ICD-10-CM

## 2024-04-30 DIAGNOSIS — C18.9 MALIGNANT NEOPLASM OF COLON, UNSPECIFIED PART OF COLON (HCC): ICD-10-CM

## 2024-04-30 DIAGNOSIS — M79.671 RIGHT FOOT PAIN: ICD-10-CM

## 2024-04-30 DIAGNOSIS — E11.9 DIABETES MELLITUS IN REMISSION (HCC): ICD-10-CM

## 2024-04-30 DIAGNOSIS — F32.5 MAJOR DEPRESSIVE DISORDER WITH SINGLE EPISODE, IN FULL REMISSION (HCC): ICD-10-CM

## 2024-04-30 DIAGNOSIS — E78.2 MIXED HYPERLIPIDEMIA: ICD-10-CM

## 2024-04-30 DIAGNOSIS — M79.671 RIGHT FOOT PAIN: Primary | ICD-10-CM

## 2024-04-30 DIAGNOSIS — I10 PRIMARY HYPERTENSION: ICD-10-CM

## 2024-04-30 DIAGNOSIS — I25.10 CAD S/P PERCUTANEOUS CORONARY ANGIOPLASTY: ICD-10-CM

## 2024-04-30 LAB
BASOPHILS # BLD AUTO: 0.07 THOUSANDS/ÂΜL (ref 0–0.1)
BASOPHILS NFR BLD AUTO: 1 % (ref 0–1)
CHOLEST SERPL-MCNC: 148 MG/DL
EOSINOPHIL # BLD AUTO: 0.44 THOUSAND/ÂΜL (ref 0–0.61)
EOSINOPHIL NFR BLD AUTO: 5 % (ref 0–6)
ERYTHROCYTE [DISTWIDTH] IN BLOOD BY AUTOMATED COUNT: 13.2 % (ref 11.6–15.1)
EST. AVERAGE GLUCOSE BLD GHB EST-MCNC: 148 MG/DL
HBA1C MFR BLD: 6.8 %
HCT VFR BLD AUTO: 43.9 % (ref 36.5–49.3)
HDLC SERPL-MCNC: 30 MG/DL
HGB BLD-MCNC: 14.8 G/DL (ref 12–17)
IMM GRANULOCYTES # BLD AUTO: 0.03 THOUSAND/UL (ref 0–0.2)
IMM GRANULOCYTES NFR BLD AUTO: 0 % (ref 0–2)
LDLC SERPL CALC-MCNC: 63 MG/DL (ref 0–100)
LYMPHOCYTES # BLD AUTO: 1.72 THOUSANDS/ÂΜL (ref 0.6–4.47)
LYMPHOCYTES NFR BLD AUTO: 20 % (ref 14–44)
MCH RBC QN AUTO: 28 PG (ref 26.8–34.3)
MCHC RBC AUTO-ENTMCNC: 33.7 G/DL (ref 31.4–37.4)
MCV RBC AUTO: 83 FL (ref 82–98)
MONOCYTES # BLD AUTO: 0.57 THOUSAND/ÂΜL (ref 0.17–1.22)
MONOCYTES NFR BLD AUTO: 7 % (ref 4–12)
NEUTROPHILS # BLD AUTO: 5.65 THOUSANDS/ÂΜL (ref 1.85–7.62)
NEUTS SEG NFR BLD AUTO: 67 % (ref 43–75)
NONHDLC SERPL-MCNC: 118 MG/DL
NRBC BLD AUTO-RTO: 0 /100 WBCS
PLATELET # BLD AUTO: 253 THOUSANDS/UL (ref 149–390)
PMV BLD AUTO: 9.8 FL (ref 8.9–12.7)
RBC # BLD AUTO: 5.29 MILLION/UL (ref 3.88–5.62)
TRIGL SERPL-MCNC: 277 MG/DL
TSH SERPL DL<=0.05 MIU/L-ACNC: 2.29 UIU/ML (ref 0.45–4.5)
URATE SERPL-MCNC: 12 MG/DL (ref 3.5–8.5)
WBC # BLD AUTO: 8.48 THOUSAND/UL (ref 4.31–10.16)

## 2024-04-30 PROCEDURE — 84443 ASSAY THYROID STIM HORMONE: CPT

## 2024-04-30 PROCEDURE — 1160F RVW MEDS BY RX/DR IN RCRD: CPT | Performed by: INTERNAL MEDICINE

## 2024-04-30 PROCEDURE — 1159F MED LIST DOCD IN RCRD: CPT | Performed by: INTERNAL MEDICINE

## 2024-04-30 PROCEDURE — G2211 COMPLEX E/M VISIT ADD ON: HCPCS | Performed by: INTERNAL MEDICINE

## 2024-04-30 PROCEDURE — 73630 X-RAY EXAM OF FOOT: CPT

## 2024-04-30 PROCEDURE — 84550 ASSAY OF BLOOD/URIC ACID: CPT

## 2024-04-30 PROCEDURE — 83036 HEMOGLOBIN GLYCOSYLATED A1C: CPT

## 2024-04-30 PROCEDURE — 99214 OFFICE O/P EST MOD 30 MIN: CPT | Performed by: INTERNAL MEDICINE

## 2024-04-30 PROCEDURE — 3075F SYST BP GE 130 - 139MM HG: CPT | Performed by: INTERNAL MEDICINE

## 2024-04-30 PROCEDURE — 85025 COMPLETE CBC W/AUTO DIFF WBC: CPT

## 2024-04-30 PROCEDURE — 80061 LIPID PANEL: CPT

## 2024-04-30 PROCEDURE — 3078F DIAST BP <80 MM HG: CPT | Performed by: INTERNAL MEDICINE

## 2024-04-30 PROCEDURE — 3044F HG A1C LEVEL LT 7.0%: CPT | Performed by: INTERNAL MEDICINE

## 2024-04-30 PROCEDURE — 36415 COLL VENOUS BLD VENIPUNCTURE: CPT

## 2024-04-30 PROCEDURE — 80053 COMPREHEN METABOLIC PANEL: CPT

## 2024-04-30 NOTE — PROGRESS NOTES
Assessment/Plan:    Diagnoses and all orders for this visit:    Right foot pain  -     Uric acid; Future  -     XR foot 3+ vw right; Future    CAD S/P percutaneous coronary angioplasty    Primary hypertension    Malignant neoplasm of colon, unspecified part of colon (HCC)    Mixed hyperlipidemia  -     CBC and differential; Future  -     Comprehensive metabolic panel; Future  -     TSH, 3rd generation with Free T4 reflex; Future  -     Lipid panel; Future    Major depressive disorder with single episode, in full remission (HCC)    Diabetes mellitus in remission (HCC)  -     CBC and differential; Future  -     Comprehensive metabolic panel; Future  -     Hemoglobin A1C; Future  -     TSH, 3rd generation with Free T4 reflex; Future  -     Uric acid; Future  -     Lipid panel; Future              Patient Instructions   Right toe pain likely secondary to trauma, could also be related to gout or degenerative arthritis.  Will check x-rays and proceed accordingly.  Check uric acid level    Diabetes in remission-check A1c    Coronary disease status post CABG-continue aggressive risk factor modification    Follow-up labs and x-rays accordingly    Subjective:      Patient ID: Mitchell Bland is a 74 y.o. male    F/u mmp   Feeling generally well  Notes pain in right great toe over the last several weeks.  He dropped an aerosol canister on it which was not terribly painful but then the pain came on about 2 days later.  Pain has been interfering with his sleep.  Sleep has been better the last few days because he is been off the foot.  CAD s/p CABG stable w/o angina, f/b Dr. Esteban  HTN/HPL-taking rx as directed, no home bp's  IFG-not watching carbs closely  H/o colon ca-had colo 3/24, 3 yr f/u 3/27  Depression/anxiety-stable on paxil qod.          Current Outpatient Medications:     albuterol (Ventolin HFA) 90 mcg/act inhaler, Inhale 2 puffs every 6 (six) hours as needed for wheezing, Disp: 18 g, Rfl: 5    aspirin 81 MG tablet,  Take 81 mg by mouth daily., Disp: , Rfl:     atorvastatin (LIPITOR) 40 mg tablet, TAKE ONE TABLET BY MOUTH EVERY DAY, Disp: 90 tablet, Rfl: 3    betamethasone, augmented, (DIPROLENE) 0.05 % ointment, Apply topically 2 (two) times a day To psoriasis till clear, Disp: 50 g, Rfl: 3    calcipotriene (DOVONOX) 0.005 % ointment, Apply topically 2 (two) times a day, Disp: 60 g, Rfl: 3    chlorthalidone 25 mg tablet, Take 1 tablet (25 mg total) by mouth daily, Disp: 30 tablet, Rfl: 5    enalapril (VASOTEC) 10 mg tablet, Take 1 tablet (10 mg total) by mouth 2 (two) times a day, Disp: 180 tablet, Rfl: 3    levocetirizine (XYZAL) 5 MG tablet, Take 5 mg by mouth daily , Disp: , Rfl:     metoprolol succinate (TOPROL-XL) 25 mg 24 hr tablet, TAKE ONE TABLET BY MOUTH EVERY DAY, Disp: 90 tablet, Rfl: 3    montelukast (SINGULAIR) 10 mg tablet, TAKE ONE TABLET BY MOUTH EVERY DAY AT BEDTIME, Disp: 30 tablet, Rfl: 3    omeprazole (PriLOSEC) 20 mg delayed release capsule, TAKE ONE CAPSULE BY MOUTH EVERY DAY, Disp: 30 capsule, Rfl: 2    PARoxetine (PAXIL) 30 mg tablet, Take 1 tablet (30 mg total) by mouth daily, Disp: 30 tablet, Rfl: 5    nitroglycerin (NITROSTAT) 0.4 mg SL tablet, Place 1 tablet (0.4 mg total) under the tongue every 5 (five) minutes as needed for chest pain (Patient not taking: Reported on 4/30/2024), Disp: 30 tablet, Rfl: 1    Recent Results (from the past 1008 hour(s))   Tissue Exam    Collection Time: 03/20/24  8:26 AM   Result Value Ref Range    Case Report       Surgical Pathology Report                         Case: [id number]                                  Authorizing Provider:  Alhaji Amado MD      Collected:           03/20/2024 0826              Ordering Location:      [facility]       Received:            03/20/2024 [address] [place] [address] Endoscopy                                                             Pathologist:           Сергей Alvarenga DO                      "                                     Specimens:   A) - Polyp, Colorectal, Hepatic Flexure                                                             B) - Polyp, Colorectal, Proximal Transverse x 2                                            Final Diagnosis       A. Polyp, Colorectal, Hepatic Flexure:  - Inflammatory polyp.  - Negative for dysplasia and malignancy.      B. Polyp, Colorectal, Proximal Transverse x2:  - Fragments of tubulovillous adenoma(s).  - Negative for high grade dysplasia and malignancy.        Additional Information       All reported additional testing was performed with appropriately reactive controls.  These tests were developed and their performance characteristics determined by [facility] or appropriate performing facility, though some tests may be performed on tissues which have not been validated for performance characteristics (such as staining performed on alcohol exposed cell blocks and decalcified tissues).  Results should be interpreted with caution and in the context of the patients’ clinical condition. These tests may not be cleared or approved by the U.S. Food and Drug Administration, though the FDA has determined that such clearance or approval is not necessary. These tests are used for clinical purposes and they should not be regarded as investigational or for research. This laboratory has been approved by CLIA 88, designated as a high-complexity laboratory and is qualified to perform these tests.    Interpretation performed at [facility] [address] [place] [id number].      Synoptic Checklist          COLON/RECTUM POLYP FORM - GI - B          :    Adenoma(s)      Gross Description       A. The specimen is received in formalin, labeled with the patient's name and hospital number, and is designated \" polyp hepatic flexure\".  The specimen consists of 1 tan polyp measuring 1.4 cm in greatest dimension.  Serially sectioned and entirely submitted " "in 1 screen cassette.  B. The specimen is received in formalin, labeled with the patient's name and hospital number, and is designated \" polyp, colorectal proximal transverse x 2\".  The specimen consists of multiple tan soft tissue fragments measuring in loose aggregate 1.5 x 0.4 x 0.4 cm.  The larger fragments are serially sectioned and the entire specimen is submitted in 1 screen cassette.    Note: The estimated total formalin fixation time based upon information provided by the submitting clinician and the standard processing schedule is under 72 hours. [facility]        Clinical Information Cold snare Polypectomy.        The following portions of the patient's history were reviewed and updated as appropriate: allergies, current medications, past family history, past medical history, past social history, past surgical history and problem list.     Review of Systems   Constitutional:  Negative for appetite change, chills, diaphoresis, fatigue, fever and unexpected weight change.   HENT:  Negative for congestion, hearing loss and rhinorrhea.    Eyes:  Negative for visual disturbance.   Respiratory:  Negative for cough, chest tightness, shortness of breath and wheezing.    Cardiovascular:  Negative for chest pain, palpitations and leg swelling.   Gastrointestinal:  Negative for abdominal pain and blood in stool.   Endocrine: Negative for cold intolerance, heat intolerance, polydipsia and polyuria.   Genitourinary:  Negative for difficulty urinating, dysuria, frequency and urgency.   Musculoskeletal:  Negative for arthralgias and myalgias.   Skin:  Negative for rash.   Neurological:  Negative for dizziness, weakness, light-headedness and headaches.   Hematological:  Does not bruise/bleed easily.   Psychiatric/Behavioral:  Negative for dysphoric mood and sleep disturbance.          Objective:      Vitals:    04/30/24 1300   BP: 134/78          Physical Exam  Constitutional:       Appearance: He is well-developed. "   HENT:      Head: Normocephalic and atraumatic.   Pulmonary:      Effort: Pulmonary effort is normal.   Musculoskeletal:      Comments: Notable swelling and tenderness of first MTP on the right without any erythema or fluctuance   Neurological:      Mental Status: He is alert and oriented to person, place, and time.   Psychiatric:         Behavior: Behavior normal. Behavior is cooperative.         Thought Content: Thought content normal.         Judgment: Judgment normal.

## 2024-04-30 NOTE — PATIENT INSTRUCTIONS
Right toe pain likely secondary to trauma, could also be related to gout or degenerative arthritis.  Will check x-rays and proceed accordingly.  Check uric acid level    Diabetes in remission-check A1c    Coronary disease status post CABG-continue aggressive risk factor modification    Follow-up labs and x-rays accordingly

## 2024-05-02 ENCOUNTER — TELEPHONE (OUTPATIENT)
Age: 74
End: 2024-05-02

## 2024-05-02 NOTE — TELEPHONE ENCOUNTER
PT called in wanting to confirm if results were in about his foot?  Confirmed w/ pt that results are in. Attempted to transfer to nurse but all nurses were unavailable at this time.    Pt would like a call back either from a nurse or Dr. Camilo to go over results.    PT call back infor is below:    NAOMIE Bland (Self)   [phone]

## 2024-05-03 NOTE — TELEPHONE ENCOUNTER
From Dr. Camilo- It does show arthritis and uric acid was very high, so could have been d/t gout.  Meg will discuss further at f/u.

## 2024-05-08 LAB
ALBUMIN SERPL BCP-MCNC: 4.5 G/DL (ref 3.5–5)
ALP SERPL-CCNC: 76 U/L (ref 34–104)
ALT SERPL W P-5'-P-CCNC: 37 U/L (ref 7–52)
ANION GAP SERPL CALCULATED.3IONS-SCNC: 7 MMOL/L (ref 4–13)
AST SERPL W P-5'-P-CCNC: 34 U/L (ref 13–39)
BILIRUB SERPL-MCNC: 1.08 MG/DL (ref 0.2–1)
BUN SERPL-MCNC: 18 MG/DL (ref 5–25)
CALCIUM SERPL-MCNC: 9.4 MG/DL (ref 8.4–10.2)
CHLORIDE SERPL-SCNC: 103 MMOL/L (ref 96–108)
CO2 SERPL-SCNC: 30 MMOL/L (ref 21–32)
CREAT SERPL-MCNC: 1.08 MG/DL (ref 0.6–1.3)
GFR SERPL CREATININE-BSD FRML MDRD: 66 ML/MIN/1.73SQ M
GLUCOSE SERPL-MCNC: 169 MG/DL (ref 65–140)
POTASSIUM SERPL-SCNC: 4.1 MMOL/L (ref 3.5–5.3)
PROT SERPL-MCNC: 6.9 G/DL (ref 6.4–8.4)
SODIUM SERPL-SCNC: 140 MMOL/L (ref 135–147)

## 2024-05-08 NOTE — PATIENT INSTRUCTIONS
Don't take more than 4 colcrys in one day.     Basic Carbohydrate Counting   AMBULATORY CARE:   Carbohydrate counting  is a way to plan your meals by counting the amount of carbohydrate in foods. Carbohydrates are the sugars, starches, and fiber found in fruit, grains, vegetables, and milk products. Carbohydrates increase your blood sugar levels. Carbohydrate counting can help you eat the right amount of carbohydrate to keep your blood sugar levels under control.   What you need to know about planning meals using carbohydrate counting:  A dietitian or healthcare provider will help you develop a healthy meal plan that works best for you. You will be taught how much carbohydrate to eat or drink for each meal and snack. Your meal plan will be based on your age, weight, usual food intake, and physical activity level. If you have diabetes, it will also include your blood sugar levels and diabetes medicine. Once you know how much carbohydrate you should eat, you can decide what type of food you want to eat.    You will need to know what foods contain carbohydrate and how much they contain. Keep track of the amount of carbohydrate in meals and snacks in order to follow your meal plan. Do not avoid carbohydrates or skip meals. Your blood sugar may fall too low if you do not eat enough carbohydrate or you skip meals.    Foods that contain carbohydrate:   Breads:  Each serving of food listed below contains about 15 g of carbohydrate .    1 slice of bread (1 ounce) or 1 flour or corn tortilla (6 inch)    ½ of a hamburger bun or ¼ of a large bagel (about 1 ounce)    1 pancake (about 4 inches across and ¼ inch thick)    Cereals and grains:  Serving sizes of ready-to-eat cereals vary. Look at the serving size and the total carbohydrate amount listed on the food label. Each serving of food listed below contains about 15 g of carbohydrate .    ¾ cup of dry, unsweetened, ready-to-eat cereal or ¼ cup of low-fat granola     ½ cup of  oatmeal or other cooked cereal     ? cup of cooked rice or pasta    Starchy vegetables and beans:  Each serving of food listed below contains about 15 g of carbohydrate .    ½ cup of corn, green peas, sweet potatoes, or mashed potatoes    ¼ of a large baked potato    ½ cup of beans, lentils, and peas (garbanzo, ortega, kidney, white, split, black-eyed)    Crackers and snacks:  Each serving of food listed below contains about 15 g of carbohydrate .    3 tana cracker squares or 8 animal crackers     6 saltine-type crackers    3 cups of popcorn or ¾ ounce of pretzels, potato chips, or tortilla chips    Fruit:  Each serving of food listed below contains about 15 g of carbohydrate .    1 small (4 ounce) piece of fresh fruit or ¾ to 1 cup of fresh fruit    ½ cup of canned or frozen fruit, packed in natural juice    ½ cup (4 ounces) of unsweetened fruit juice    2 tablespoons of dried fruit    Desserts or sugary foods:  Each serving of food listed below contains about 15 g of carbohydrate .    2-inch square unfrosted cake or brownie     2 small cookies    ½ cup of ice cream, frozen yogurt, or nondairy frozen yogurt    ¼ cup of sherbet or sorbet    1 tablespoon of regular syrup, jam, or jelly    2 tablespoons of light syrup    Milk and yogurt:  Foods from the milk group contain about 12 g of carbohydrate per serving.    1 cup of fat-free or low-fat milk    1 cup of soy milk    ? cup of fat-free, yogurt sweetened with artificial sweetener    Non-starchy vegetables:  Each serving contains about 5 g of carbohydrate . Three servings of non-starch vegetables count as 1 carbohydrate serving.     ½ cup of cooked vegetables or 1 cup of raw vegetables. This includes beets, broccoli, cabbage, cauliflower, cucumber, mushrooms, tomatoes, and zucchini    ½ cup of vegetable juice    How to use carbohydrate counting to plan meals:   Count carbohydrate amounts using serving sizes:      Pasta dinner example:  You plan to have pasta, tossed  salad, and an 8-ounce glass of milk. Your healthcare provider tells you that you may have 4 carbohydrate servings for dinner. One carbohydrate serving of pasta is ? cup. One cup of pasta will equal 3 carbohydrate servings. An 8-ounce glass of milk will count as 1 carbohydrate serving. These amounts of food would equal 4 carbohydrate servings. One cup of tossed salad does not count toward your carbohydrate servings.       Count carbohydrate amounts using food labels:  Find the total amount of carbohydrate in a packaged food by reading the food label. Food labels tell you the serving size of the food and the total carbohydrate amount in each serving. Find the serving size on the food label and then decide how many servings you will eat. Multiply the number of servings you plan to eat by the carbohydrate amount per serving.     Granola bar snack example:  Your meal plan allows you to have 2 carbohydrate servings (30 grams) of carbohydrate for a snack. You plan to eat 1 package of granola bars, which contains 2 bars. According to the food label, the serving size of food in this package is 1 bar. Each serving (1 bar) contains 25 grams of carbohydrate. The total amount of carbohydrate in this package of granola bars would be 50 g. Based on your meal plan, you should eat only 1 bar.      Follow up with your doctor as directed:  Write down your questions so you remember to ask them during your visits.  © Copyright [employer] 2023 Information is for End User's use only and may not be sold, redistributed or otherwise used for commercial purposes.  The above information is an  only. It is not intended as medical advice for individual conditions or treatments. Talk to your doctor, nurse or pharmacist before following any medical regimen to see if it is safe and effective for you.

## 2024-05-09 ENCOUNTER — OFFICE VISIT (OUTPATIENT)
Age: 74
End: 2024-05-09
Payer: COMMERCIAL

## 2024-05-09 VITALS
BODY MASS INDEX: 30.06 KG/M2 | HEART RATE: 77 BPM | DIASTOLIC BLOOD PRESSURE: 70 MMHG | WEIGHT: 210 LBS | HEIGHT: 70 IN | OXYGEN SATURATION: 98 % | SYSTOLIC BLOOD PRESSURE: 130 MMHG

## 2024-05-09 DIAGNOSIS — R53.83 OTHER FATIGUE: ICD-10-CM

## 2024-05-09 DIAGNOSIS — M79.674 GREAT TOE PAIN, RIGHT: Primary | ICD-10-CM

## 2024-05-09 DIAGNOSIS — E78.2 MIXED HYPERLIPIDEMIA: ICD-10-CM

## 2024-05-09 DIAGNOSIS — E11.9 DIABETES MELLITUS IN REMISSION (HCC): ICD-10-CM

## 2024-05-09 DIAGNOSIS — I10 PRIMARY HYPERTENSION: ICD-10-CM

## 2024-05-09 PROCEDURE — 99214 OFFICE O/P EST MOD 30 MIN: CPT

## 2024-05-09 PROCEDURE — G2211 COMPLEX E/M VISIT ADD ON: HCPCS

## 2024-05-09 RX ORDER — COLCHICINE 0.6 MG/1
TABLET ORAL
Qty: 30 TABLET | Refills: 0 | Status: SHIPPED | OUTPATIENT
Start: 2024-05-09

## 2024-05-09 NOTE — PROGRESS NOTES
INTERNAL MEDICINE FOLLOW-UP VISIT  [facility] - [facility]IC[facility] [address]    NAME: Mitchell Bland  AGE: 74 y.o. SEX: male  : 1950     DATE: 2024     Assessment and Plan:   1. Great toe pain, right  Recent uric acid was elevated at 12 and right foot x-ray showed arthritis.  Discussed that this is most likely related to gout.  Take 2 Colcrys when you get home, then every 6 hours until pain resolves.  Do not take more than 4 Colcrys in a day.  Will follow-up with uric acid in 6 months.  If you begin to experience any worsening symptoms or recurrence, notify the office.    2. Mixed hyperlipidemia  Recent LDL was stable at 63, triglycerides were elevated.  Patient admits to eating a tasty cake and soda prior to getting his labs done.  Currently on atorvastatin 40 mg daily.    3. Primary hypertension  BP was 130/70 in office, stable.    4. Diabetes mellitus in remission (HCC)  A1c was 6.8, discussed cutting back on carbs like bread, rice, pasta, soda, and sugarsy snacks in diet.        No follow-ups on file.       Chief Complaint:     Chief Complaint   Patient presents with    Follow-up     Foot pain      History of Present Illness:   Patient is a 74-year-old male that presents today for a follow-up to review his foot x-ray and labs.    The following portions of the patient's history were reviewed and updated as appropriate: allergies, current medications, past family history, past medical history, past social history, past surgical history and problem list.     Review of Systems:     Review of Systems   Constitutional:  Negative for chills and fever.   Musculoskeletal:  Positive for arthralgias and gait problem.        Past Medical History:     Past Medical History:   Diagnosis Date    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HCC)     large intestine    Allergic rhinitis     Anemia     Anxiety     Atherosclerosis     CAD (coronary artery disease)     Colitis     Depression     Diabetes mellitus (HCC)   "   Difficulty breathing     Gastritis     GERD (gastroesophageal reflux disease)     Hepatic cyst     Hyperlipidemia     Hypertension     IFG (impaired fasting glucose)     Myocardial infarction (HCC)     9 YEARS AGO    Psoriasis         Current Medications:     Current Outpatient Medications:     albuterol (Ventolin HFA) 90 mcg/act inhaler, Inhale 2 puffs every 6 (six) hours as needed for wheezing, Disp: 18 g, Rfl: 5    aspirin 81 MG tablet, Take 81 mg by mouth daily., Disp: , Rfl:     atorvastatin (LIPITOR) 40 mg tablet, TAKE ONE TABLET BY MOUTH EVERY DAY, Disp: 90 tablet, Rfl: 3    betamethasone, augmented, (DIPROLENE) 0.05 % ointment, Apply topically 2 (two) times a day To psoriasis till clear, Disp: 50 g, Rfl: 3    calcipotriene (DOVONOX) 0.005 % ointment, Apply topically 2 (two) times a day, Disp: 60 g, Rfl: 3    chlorthalidone 25 mg tablet, Take 1 tablet (25 mg total) by mouth daily, Disp: 30 tablet, Rfl: 5    enalapril (VASOTEC) 10 mg tablet, Take 1 tablet (10 mg total) by mouth 2 (two) times a day, Disp: 180 tablet, Rfl: 3    levocetirizine (XYZAL) 5 MG tablet, Take 5 mg by mouth daily , Disp: , Rfl:     metoprolol succinate (TOPROL-XL) 25 mg 24 hr tablet, TAKE ONE TABLET BY MOUTH EVERY DAY, Disp: 90 tablet, Rfl: 3    montelukast (SINGULAIR) 10 mg tablet, TAKE ONE TABLET BY MOUTH EVERY DAY AT BEDTIME, Disp: 30 tablet, Rfl: 3    nitroglycerin (NITROSTAT) 0.4 mg SL tablet, Place 1 tablet (0.4 mg total) under the tongue every 5 (five) minutes as needed for chest pain, Disp: 30 tablet, Rfl: 1    omeprazole (PriLOSEC) 20 mg delayed release capsule, TAKE ONE CAPSULE BY MOUTH EVERY DAY, Disp: 30 capsule, Rfl: 2    PARoxetine (PAXIL) 30 mg tablet, Take 1 tablet (30 mg total) by mouth daily, Disp: 30 tablet, Rfl: 5     Allergies:     Allergies   Allergen Reactions    Penicillins Hives and Other (See Comments)        Physical Exam:     /70   Pulse 77   Ht 5' 10\" (1.778 m)   Wt 95.3 kg (210 lb)   SpO2 98%   " BMI 30.13 kg/m²     Physical Exam  Vitals and nursing note reviewed.   Constitutional:       General: He is awake.      Appearance: Normal appearance. He is well-developed, well-groomed and overweight.   HENT:      Head: Normocephalic and atraumatic.      Right Ear: Hearing and external ear normal.      Left Ear: Hearing and external ear normal.      Nose: Nose normal.      Mouth/Throat:      Lips: Pink.      Mouth: Mucous membranes are moist.   Eyes:      General: Lids are normal. Vision grossly intact. Gaze aligned appropriately.      Conjunctiva/sclera: Conjunctivae normal.   Neck:      Vascular: No carotid bruit.      Trachea: Trachea and phonation normal.   Cardiovascular:      Rate and Rhythm: Normal rate and regular rhythm.      Heart sounds: Normal heart sounds, S1 normal and S2 normal. No murmur heard.     No friction rub. No gallop.   Pulmonary:      Effort: Pulmonary effort is normal.      Breath sounds: Normal breath sounds and air entry. No decreased breath sounds, wheezing, rhonchi or rales.   Abdominal:      General: Abdomen is protuberant.   Musculoskeletal:      Cervical back: Neck supple.      Right lower leg: No edema.      Left lower leg: No edema.   Feet:      Right foot:      Skin integrity: Erythema present. No warmth.      Comments: Mild pain to palpation of right great toe.  Skin:     General: Skin is warm.      Capillary Refill: Capillary refill takes less than 2 seconds.   Neurological:      Mental Status: He is alert.   Psychiatric:         Attention and Perception: Attention and perception normal.         Mood and Affect: Mood and affect normal.         Speech: Speech normal.         Behavior: Behavior normal. Behavior is cooperative.         Thought Content: Thought content normal.         Cognition and Memory: Cognition and memory normal.         Judgment: Judgment normal.           Data:     Laboratory Results: I have personally reviewed the pertinent laboratory results/reports    Radiology/Other Diagnostic Testing Results: I have personally reviewed pertinent reports.      Meg Alcantar PA-C  [facility] INTERNAL MEDICINE [address]

## 2024-05-28 DIAGNOSIS — F32.A DEPRESSION, UNSPECIFIED DEPRESSION TYPE: ICD-10-CM

## 2024-05-28 RX ORDER — PAROXETINE 30 MG/1
30 TABLET, FILM COATED ORAL DAILY
Qty: 30 TABLET | Refills: 5 | Status: SHIPPED | OUTPATIENT
Start: 2024-05-28

## 2024-07-01 DIAGNOSIS — K21.9 GASTROESOPHAGEAL REFLUX DISEASE WITHOUT ESOPHAGITIS: ICD-10-CM

## 2024-07-02 RX ORDER — OMEPRAZOLE 20 MG/1
20 CAPSULE, DELAYED RELEASE ORAL DAILY
Qty: 30 CAPSULE | Refills: 2 | Status: SHIPPED | OUTPATIENT
Start: 2024-07-02

## 2024-07-17 ENCOUNTER — OFFICE VISIT (OUTPATIENT)
Dept: CARDIOLOGY CLINIC | Facility: CLINIC | Age: 74
End: 2024-07-17
Payer: COMMERCIAL

## 2024-07-17 VITALS
WEIGHT: 207 LBS | HEIGHT: 70 IN | BODY MASS INDEX: 29.63 KG/M2 | RESPIRATION RATE: 16 BRPM | HEART RATE: 64 BPM | OXYGEN SATURATION: 97 % | DIASTOLIC BLOOD PRESSURE: 78 MMHG | SYSTOLIC BLOOD PRESSURE: 120 MMHG

## 2024-07-17 DIAGNOSIS — I51.89 DIASTOLIC DYSFUNCTION: ICD-10-CM

## 2024-07-17 DIAGNOSIS — I25.10 CAD S/P PERCUTANEOUS CORONARY ANGIOPLASTY: Primary | ICD-10-CM

## 2024-07-17 DIAGNOSIS — Z95.1 HX OF CABG: ICD-10-CM

## 2024-07-17 DIAGNOSIS — Z98.61 CAD S/P PERCUTANEOUS CORONARY ANGIOPLASTY: Primary | ICD-10-CM

## 2024-07-17 DIAGNOSIS — E78.2 MIXED HYPERLIPIDEMIA: ICD-10-CM

## 2024-07-17 DIAGNOSIS — I10 PRIMARY HYPERTENSION: ICD-10-CM

## 2024-07-17 PROCEDURE — 99214 OFFICE O/P EST MOD 30 MIN: CPT | Performed by: INTERNAL MEDICINE

## 2024-07-17 NOTE — PROGRESS NOTES
CARDIOLOGY OFFICE VISIT  [facility]  [address] [id number]  Tel: [phone]      NAME: Mitchell Bland  AGE: 74 y.o.  SEX: male  : 1950  MRN: [id number]      Chief Complaint:  Chief Complaint   Patient presents with    Follow-up         History of Present Illness:   Patient comes for follow up. States he is doing well from cardiac stand point and denies chest pain / pressure, SOB, palpitations, lightheadedness, syncope, swelling feet, orthopnea, PND, claudication.    CAD s/p PCI in , s/p CABG x2 in  at [facility]  -  States is doing well cardiac wise with no cardiac symptoms.  Taking all medications regularly.  Has not used SL NTG since the last clinic visit.    Primary hypertension, diastolic dysfunction -  Has been hypertensive for many years.  Taking medications regularly.  Denies lightheadedness, headache, medication side effects.      Mixed hyperlipidemia -  Has had hyperlipidemia for many years.  Taking statin regularly along with diet control.  Denies myalgia.  PCP closely monitoring the blood work.      Past Medical History:  Past Medical History:   Diagnosis Date    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HCC)     large intestine    Allergic rhinitis     Anemia     Anxiety     Atherosclerosis     CAD (coronary artery disease)     Colitis     Depression     Diabetes mellitus (HCC)     Difficulty breathing     Gastritis     GERD (gastroesophageal reflux disease)     Hepatic cyst     Hyperlipidemia     Hypertension     IFG (impaired fasting glucose)     Myocardial infarction (HCC)     9 YEARS AGO    Psoriasis          Past Surgical History:  Past Surgical History:   Procedure Laterality Date    ANGIOPLASTY      COLON SURGERY      CORONARY ARTERY BYPASS GRAFT      CO COLONOSCOPY FLX DX W/COLLJ SPEC WHEN PFRMD N/A 2017    Procedure: COLONOSCOPY;  Surgeon: Alhaji Amado MD;  Location: [facility];  Service: Gastroenterology     MI RMVL HEATHER CTR VAD W/SUBQ PORT/ CTR/PRPH INSJ Left 5/13/2016    Procedure: REMOVAL VENOUS PORT (PORT-A-CATH);  Surgeon: LONG Pulliam MD;  [facility];  Service: Colorectal         Family History:  Family History   Problem Relation Age of Onset    Colon cancer Family     Diverticulosis Family         colonic    Cancer Father          Social History:  Social History     Socioeconomic History    Marital status: /Civil Union     Spouse name: None    Number of children: None    Years of education: None    Highest education level: None   Occupational History    None   Tobacco Use    Smoking status: Never    Smokeless tobacco: Never   Vaping Use    Vaping status: Never Used   Substance and Sexual Activity    Alcohol use: Yes     Comment: social    Drug use: No    Sexual activity: Not Currently     Partners: Female   Other Topics Concern    None   Social History Narrative    No advance directives     Social Determinants of Health     Financial Resource Strain: Low Risk  (11/7/2023)    Overall Financial Resource Strain (CARDIA)     Difficulty of Paying Living Expenses: Not very hard   Food Insecurity: Not on file   Transportation Needs: No Transportation Needs (11/7/2023)    PRAPARE - Transportation     Lack of Transportation (Medical): No     Lack of Transportation (Non-Medical): No   Physical Activity: Unknown (8/31/2022)    Exercise Vital Sign     Days of Exercise per Week: 0 days     Minutes of Exercise per Session: Not on file   Stress: No Stress Concern Present (9/23/2022)    [employer] [place] [employer] - Occupational Stress Questionnaire     Feeling of Stress : Only a little   Social Connections: Not on file   Intimate Partner Violence: Not on file   Housing Stability: Not on file         Active Problems:  Patient Active Problem List   Diagnosis    Colon cancer (HCC)    CAD S/P percutaneous coronary angioplasty    Gastritis    Hypertension    Hyperlipidemia    Hx of CABG     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      Review of Systems:  Constitutional: Denies fever, chills  Eyes: Denies eye redness, eye discharge  ENT: Denies hearing loss, sneezing, nasal discharge, sore throat   Respiratory: Denies cough, expectoration, shortness of breath  Cardiovascular: Denies chest pain, palpitations, lower extremity swelling  Gastrointestinal: Denies abdominal pain, nausea, vomiting, diarrhea  Genito-Urinary: Denies dysuria, incontinence  Musculoskeletal: Denies back pain, joint pain, muscle pain  Neurologic: Denies lightheadedness, syncope, headache, seizures  Endocrine: Denies polydipsia, temperature intolerance  Allergy and Immunology: Denies hives, insect bite sensitivity  Hematological and Lymphatic: Denies bleeding problems, swollen glands   Psychological: Denies depression, suicidal ideation, anxiety, panic  Dermatological: Denies pruritus, rash, skin lesion changes      Vitals:  Vitals:    07/17/24 1057   BP: 120/78   Pulse: 64   Resp: 16   SpO2: 97%       Body mass index is 29.7 kg/m².    Weight (last 2 days)       Date/Time Weight    07/17/24 1057 93.9 (207)              Physical Examination:  General: Patient is not in acute distress. Awake, alert, oriented in time, place and person. Responding to commands  Head: Normocephalic. Atraumatic  Eyes: Both pupils normal sized, round and reactive to light. Nonicteric  ENT: Normal external ear canals  Neck: Supple. JVP not raised. Trachea central. No thyromegaly  Lungs: Bilateral bronchovascular breath sounds with no crackles or rhonchi  Chest  wall: No tenderness  Cardiovascular: RRR. S1 and S2 normal. No murmur, rub or gallop  Gastrointestinal: Abdomen soft, nontender. No guarding or rigidity. Liver and spleen not palpable. Bowel sounds present  Neurologic: Patient is awake, alert, oriented in time, place and person. Responding to commands. Moving all extremities  Integumentary:  No skin rash  Lymphatic: No cervical lymphadenopathy  Back: Symmetric. No CVA tenderness  Extremities: No clubbing, cyanosis or edema      Laboratory Results:  CBC with diff:   Lab Results   Component Value Date    WBC 8.48 04/30/2024    WBC 6.1 03/16/2015    RBC 5.29 04/30/2024    RBC 4.91 03/16/2015    HGB 14.8 04/30/2024    HGB 14.7 03/16/2015    HCT 43.9 04/30/2024    HCT 42.0 03/16/2015    MCV 83 04/30/2024    MCV 85 03/16/2015    MCH 28.0 04/30/2024    MCH 29.9 03/16/2015    RDW 13.2 04/30/2024    RDW 12.0 03/16/2015     04/30/2024     03/16/2015       CMP:  Lab Results   Component Value Date    CREATININE 1.08 04/30/2024    CREATININE 1.1 03/16/2015    BUN 18 04/30/2024    BUN 12 03/16/2015     (H) 03/16/2015    K 4.1 04/30/2024    K 4.0 03/16/2015     04/30/2024     (H) 03/16/2015    CO2 30 04/30/2024    CO2 29.4 03/16/2015    GLUCOSE 104 03/16/2015    PROT 6.8 03/16/2015    ALKPHOS 76 04/30/2024    ALKPHOS 89 03/16/2015    ALT 37 04/30/2024    ALT 43 03/16/2015    AST 34 04/30/2024    AST 23 03/16/2015    BILIDIR 0.19 09/07/2018       Lab Results   Component Value Date    HGBA1C 6.8 (H) 04/30/2024    HGBA1C 5.1 03/16/2015    MG 2.0 04/01/2014    PHOS 2.4 (L) 04/01/2014       Lipid Profile:   Lab Results   Component Value Date    CHOL 129 03/16/2015    CHOL 148 02/12/2014     Lab Results   Component Value Date    HDL 30 (L) 04/30/2024    HDL 34 (L) 11/20/2023    HDL 31 (L) 08/30/2022     Lab Results   Component Value Date    LDLCALC 63 04/30/2024    LDLCALC 54 11/20/2023    LDLCALC 65 08/30/2022     Lab Results   Component Value Date     TRIG 277 (H) 04/30/2024    TRIG 248 (H) 11/20/2023    TRIG 120 08/30/2022       Medications:    Current Outpatient Medications:     albuterol (Ventolin HFA) 90 mcg/act inhaler, Inhale 2 puffs every 6 (six) hours as needed for wheezing, Disp: 18 g, Rfl: 5    aspirin 81 MG tablet, Take 81 mg by mouth daily., Disp: , Rfl:     atorvastatin (LIPITOR) 40 mg tablet, TAKE ONE TABLET BY MOUTH EVERY DAY, Disp: 90 tablet, Rfl: 3    betamethasone, augmented, (DIPROLENE) 0.05 % ointment, Apply topically 2 (two) times a day To psoriasis till clear, Disp: 50 g, Rfl: 3    calcipotriene (DOVONOX) 0.005 % ointment, Apply topically 2 (two) times a day, Disp: 60 g, Rfl: 3    chlorthalidone 25 mg tablet, Take 1 tablet (25 mg total) by mouth daily, Disp: 30 tablet, Rfl: 5    colchicine (COLCRYS) 0.6 mg tablet, Take 2 now, then 1 every 6 hours until gout resolves., Disp: 30 tablet, Rfl: 0    enalapril (VASOTEC) 10 mg tablet, Take 1 tablet (10 mg total) by mouth 2 (two) times a day, Disp: 180 tablet, Rfl: 3    levocetirizine (XYZAL) 5 MG tablet, Take 5 mg by mouth daily , Disp: , Rfl:     metoprolol succinate (TOPROL-XL) 25 mg 24 hr tablet, TAKE ONE TABLET BY MOUTH EVERY DAY, Disp: 90 tablet, Rfl: 3    montelukast (SINGULAIR) 10 mg tablet, TAKE ONE TABLET BY MOUTH EVERY DAY AT BEDTIME, Disp: 30 tablet, Rfl: 3    nitroglycerin (NITROSTAT) 0.4 mg SL tablet, Place 1 tablet (0.4 mg total) under the tongue every 5 (five) minutes as needed for chest pain, Disp: 30 tablet, Rfl: 1    omeprazole (PriLOSEC) 20 mg delayed release capsule, TAKE ONE CAPSULE BY MOUTH EVERY DAY, Disp: 30 capsule, Rfl: 2    PARoxetine (PAXIL) 30 mg tablet, TAKE ONE TABLET BY MOUTH EVERY DAY, Disp: 30 tablet, Rfl: 5      Allergies:  Allergies   Allergen Reactions    Penicillins Hives and Other (See Comments)         Assessment and Plan:  1. CAD S/P percutaneous coronary angioplasty  2. Hx of CABG  Mitchell is doing well.  I asked him to continue his aspirin, statin,  beta-blocker, as needed SL NTG    3. Primary hypertension  BP stable.  Continue metoprolol succinate 25 mg daily, enalapril 10 mg twice daily, chlorthalidone 25 mg daily.  Continue to monitor BP at home and call if abnormal    4. Diastolic dysfunction  Tight BP control    5. Mixed hyperlipidemia  Continue atorvastatin 40 mg daily and diet controlled.  His PCP closely monitors his blood work    Recommend aggressive risk factor modification and therapeutic lifestyle changes.  Low-salt, low-calorie, low-fat, low-cholesterol diet with regular exercise and to optimize weight.  I will defer the ordering and monitoring of necessity lab studies to you, but I am available and happy to review and manage any of the data at your request in the future.    Discussed concepts of atherosclerosis, including signs and symptoms of cardiac disease.    Previous studies were reviewed.    Safety measures were reviewed.  Questions were entertained and answered.  Patient was advised to report any problems requiring medical attention.    Follow-up with PCP and appropriate specialist and lab work as discussed.    Return for follow up visit as scheduled or earlier, if needed.  Thank you for allowing me to participate in the care and evaluation of your patient.  Should you have any questions, please feel free to contact me.    George Esteban MD  7/17/2024,11:14 AM

## 2024-08-04 ENCOUNTER — OFFICE VISIT (OUTPATIENT)
Dept: URGENT CARE | Facility: CLINIC | Age: 74
End: 2024-08-04
Payer: COMMERCIAL

## 2024-08-04 VITALS
WEIGHT: 200 LBS | BODY MASS INDEX: 28.63 KG/M2 | OXYGEN SATURATION: 94 % | DIASTOLIC BLOOD PRESSURE: 59 MMHG | SYSTOLIC BLOOD PRESSURE: 129 MMHG | HEIGHT: 70 IN | RESPIRATION RATE: 18 BRPM | TEMPERATURE: 98.1 F | HEART RATE: 70 BPM

## 2024-08-04 DIAGNOSIS — M10.9 ACUTE GOUT INVOLVING TOE OF RIGHT FOOT, UNSPECIFIED CAUSE: Primary | ICD-10-CM

## 2024-08-04 PROCEDURE — 99213 OFFICE O/P EST LOW 20 MIN: CPT | Performed by: PHYSICAL MEDICINE & REHABILITATION

## 2024-08-04 PROCEDURE — S9083 URGENT CARE CENTER GLOBAL: HCPCS | Performed by: PHYSICAL MEDICINE & REHABILITATION

## 2024-08-04 NOTE — PROGRESS NOTES
[facility]        NAME: Mitchell Bland is a 74 y.o. male  : 1950    MRN: [id number]  DATE: 2024  TIME: 2:37 PM    Assessment and Plan   Acute gout involving toe of right foot, unspecified cause [M10.9]  1. Acute gout involving toe of right foot, unspecified cause          Advised patient to start Colchine and follow up with PCP in next few days if symptoms do not resolve.  No acute trauma or injury to region.     Patient Instructions       Follow up with PCP in 3-5 days.  Proceed to  ER if symptoms worsen.    If tests are performed, our office will contact you with results only if changes need to made to the care plan discussed with you at the visit. You can review your full results on [facility].    Chief Complaint     Chief Complaint   Patient presents with    Foot Pain     Right foot pain, Symptoms started last night.          History of Present Illness       Patient presenting with right great toe pain. Patient with hx of gout to this area. States symptoms feel similar. Was previously prescribed colchine but never took because pain resolved on its own.        Review of Systems   Review of Systems   Constitutional: Negative.    Respiratory: Negative.     Cardiovascular: Negative.    Musculoskeletal:  Positive for arthralgias and myalgias.   Neurological: Negative.          Current Medications       Current Outpatient Medications:     albuterol (Ventolin HFA) 90 mcg/act inhaler, Inhale 2 puffs every 6 (six) hours as needed for wheezing, Disp: 18 g, Rfl: 5    aspirin 81 MG tablet, Take 81 mg by mouth daily., Disp: , Rfl:     atorvastatin (LIPITOR) 40 mg tablet, TAKE ONE TABLET BY MOUTH EVERY DAY, Disp: 90 tablet, Rfl: 3    betamethasone, augmented, (DIPROLENE) 0.05 % ointment, Apply topically 2 (two) times a day To psoriasis till clear, Disp: 50 g, Rfl: 3    calcipotriene (DOVONOX) 0.005 % ointment, Apply topically 2 (two) times a day, Disp: 60 g, Rfl: 3    chlorthalidone 25 mg  tablet, Take 1 tablet (25 mg total) by mouth daily, Disp: 30 tablet, Rfl: 5    enalapril (VASOTEC) 10 mg tablet, Take 1 tablet (10 mg total) by mouth 2 (two) times a day, Disp: 180 tablet, Rfl: 3    levocetirizine (XYZAL) 5 MG tablet, Take 5 mg by mouth daily , Disp: , Rfl:     metoprolol succinate (TOPROL-XL) 25 mg 24 hr tablet, TAKE ONE TABLET BY MOUTH EVERY DAY, Disp: 90 tablet, Rfl: 3    montelukast (SINGULAIR) 10 mg tablet, TAKE ONE TABLET BY MOUTH EVERY DAY AT BEDTIME, Disp: 30 tablet, Rfl: 3    nitroglycerin (NITROSTAT) 0.4 mg SL tablet, Place 1 tablet (0.4 mg total) under the tongue every 5 (five) minutes as needed for chest pain, Disp: 30 tablet, Rfl: 1    omeprazole (PriLOSEC) 20 mg delayed release capsule, TAKE ONE CAPSULE BY MOUTH EVERY DAY, Disp: 30 capsule, Rfl: 2    PARoxetine (PAXIL) 30 mg tablet, TAKE ONE TABLET BY MOUTH EVERY DAY, Disp: 30 tablet, Rfl: 5    colchicine (COLCRYS) 0.6 mg tablet, Take 2 now, then 1 every 6 hours until gout resolves., Disp: 30 tablet, Rfl: 0    Current Allergies     Allergies as of 08/04/2024 - Reviewed 08/04/2024   Allergen Reaction Noted    Penicillins Hives and Other (See Comments) 01/31/2014            The following portions of the patient's history were reviewed and updated as appropriate: allergies, current medications, past family history, past medical history, past social history, past surgical history and problem list.     Past Medical History:   Diagnosis Date    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HCC)     large intestine    Allergic rhinitis     Anemia     Anxiety     Atherosclerosis     CAD (coronary artery disease)     Colitis     Depression     Diabetes mellitus (HCC)     Difficulty breathing     Gastritis     GERD (gastroesophageal reflux disease)     Hepatic cyst     Hyperlipidemia     Hypertension     IFG (impaired fasting glucose)     Myocardial infarction (HCC)     9 YEARS AGO    Psoriasis        Past Surgical History:   Procedure Laterality Date    ANGIOPLASTY       "COLON SURGERY      CORONARY ARTERY BYPASS GRAFT      DE COLONOSCOPY FLX DX W/COLLJ SPEC WHEN PFRMD N/A 2/21/2017    Procedure: COLONOSCOPY;  Surgeon: Alhaji Amado MD;  Location: [facility];  Service: Gastroenterology    DE RMVL HEATHER CTR VAD W/SUBQ PORT/ CTR/PRPH INSJ Left 5/13/2016    Procedure: REMOVAL VENOUS PORT (PORT-A-CATH);  Surgeon: LONG Pulliam MD;  [facility];  Service: Colorectal       Family History   Problem Relation Age of Onset    Colon cancer Family     Diverticulosis Family         colonic    Cancer Father          Medications have been verified.        Objective   /59   Pulse 70   Temp 98.1 °F (36.7 °C)   Resp 18   Ht 5' 10\" (1.778 m)   Wt 90.7 kg (200 lb)   SpO2 94%   BMI 28.70 kg/m²        Physical Exam     Physical Exam  Vitals reviewed.   Cardiovascular:      Rate and Rhythm: Normal rate and regular rhythm.      Pulses: Normal pulses.      Heart sounds: Normal heart sounds.   Pulmonary:      Effort: Pulmonary effort is normal. No respiratory distress.      Breath sounds: Normal breath sounds.   Musculoskeletal:         General: Tenderness present.      Comments: Redness along lateral aspect of right great toe and tender to touch   Skin:     Findings: Erythema present.   Neurological:      Mental Status: He is alert.                   "

## 2024-08-05 ENCOUNTER — TELEPHONE (OUTPATIENT)
Age: 74
End: 2024-08-05

## 2024-08-05 DIAGNOSIS — M79.674 GREAT TOE PAIN, RIGHT: Primary | ICD-10-CM

## 2024-08-05 RX ORDER — PREDNISONE 10 MG/1
TABLET ORAL
Qty: 28 TABLET | Refills: 0 | Status: SHIPPED | OUTPATIENT
Start: 2024-08-05

## 2024-08-05 NOTE — TELEPHONE ENCOUNTER
PT called in stating that the script he received from Meg, for his gout is not working.    PT wanted to know if there is another medication that can be prescribed to him?    Script going to the following pharmacy:  [id number] - [place] - [address] [place] [address] [phone]     For any further information or questions, please call pt. Thank you!    NAOMIE Bland   [phone]

## 2024-08-09 DIAGNOSIS — I10 ESSENTIAL HYPERTENSION: ICD-10-CM

## 2024-08-09 RX ORDER — CHLORTHALIDONE 25 MG/1
25 TABLET ORAL DAILY
Qty: 30 TABLET | Refills: 5 | Status: SHIPPED | OUTPATIENT
Start: 2024-08-09

## 2024-09-05 DIAGNOSIS — I10 ESSENTIAL HYPERTENSION: ICD-10-CM

## 2024-09-05 RX ORDER — ENALAPRIL MALEATE 10 MG/1
10 TABLET ORAL 2 TIMES DAILY
Qty: 180 TABLET | Refills: 1 | Status: SHIPPED | OUTPATIENT
Start: 2024-09-05

## 2024-09-16 DIAGNOSIS — I10 ESSENTIAL HYPERTENSION: ICD-10-CM

## 2024-09-17 RX ORDER — METOPROLOL SUCCINATE 25 MG/1
TABLET, EXTENDED RELEASE ORAL
Qty: 90 TABLET | Refills: 1 | Status: SHIPPED | OUTPATIENT
Start: 2024-09-17

## 2024-09-23 ENCOUNTER — TELEPHONE (OUTPATIENT)
Age: 74
End: 2024-09-23

## 2024-09-23 NOTE — TELEPHONE ENCOUNTER
Pt called in and would like for Meg to give him a call if she can please. Call is in regards to his gout.     Please advise. Thanks

## 2024-09-24 ENCOUNTER — TELEPHONE (OUTPATIENT)
Age: 74
End: 2024-09-24

## 2024-09-24 DIAGNOSIS — M79.674 GREAT TOE PAIN, RIGHT: ICD-10-CM

## 2024-09-24 RX ORDER — PREDNISONE 10 MG/1
TABLET ORAL
Qty: 28 TABLET | Refills: 0 | Status: SHIPPED | OUTPATIENT
Start: 2024-09-24

## 2024-09-24 NOTE — TELEPHONE ENCOUNTER
Spoke with patient, yesterday and today he started the gout, wants to know if prednisone is the only thing he can take for this?    Do you want him to schedule appt?

## 2024-09-24 NOTE — TELEPHONE ENCOUNTER
Patient has been contacted and informed via his wife that the medication has been sent to the pharmacy

## 2024-09-24 NOTE — TELEPHONE ENCOUNTER
Does he have Colcrys at home?  He can take 2 now, then 1 every 6 hours until pain resolves.  Do not take more than 4 in 1 day as it can cause diarrhea.

## 2024-09-24 NOTE — TELEPHONE ENCOUNTER
Patient is requesting more prednisone, he stated that it worked very well all by itself the last time because the colchicine gave him diarrhea. He would like the prescription for the prednisone sent to [facility], please advise

## 2024-10-01 DIAGNOSIS — K21.9 GASTROESOPHAGEAL REFLUX DISEASE WITHOUT ESOPHAGITIS: ICD-10-CM

## 2024-11-06 ENCOUNTER — TELEPHONE (OUTPATIENT)
Age: 74
End: 2024-11-06

## 2024-11-06 NOTE — TELEPHONE ENCOUNTER
Pt stated his has a cold for 2 weeks cough sinus hurt runny nose no energy can doctor Leslee send medication to shop [facility] in [place].please advise [phone]

## 2024-11-07 ENCOUNTER — OFFICE VISIT (OUTPATIENT)
Age: 74
End: 2024-11-07
Payer: COMMERCIAL

## 2024-11-07 ENCOUNTER — RA CDI HCC (OUTPATIENT)
Dept: OTHER | Facility: HOSPITAL | Age: 74
End: 2024-11-07

## 2024-11-07 VITALS
BODY MASS INDEX: 28.63 KG/M2 | DIASTOLIC BLOOD PRESSURE: 66 MMHG | HEIGHT: 70 IN | OXYGEN SATURATION: 98 % | SYSTOLIC BLOOD PRESSURE: 108 MMHG | RESPIRATION RATE: 18 BRPM | HEART RATE: 76 BPM | TEMPERATURE: 97.3 F | WEIGHT: 200 LBS

## 2024-11-07 DIAGNOSIS — R05.1 ACUTE COUGH: Primary | ICD-10-CM

## 2024-11-07 DIAGNOSIS — M35.9: ICD-10-CM

## 2024-11-07 PROBLEM — M75.120 FULL THICKNESS ROTATOR CUFF TEAR: Status: ACTIVE | Noted: 2024-11-07

## 2024-11-07 PROBLEM — M50.30 DDD (DEGENERATIVE DISC DISEASE), CERVICAL: Status: ACTIVE | Noted: 2024-11-07

## 2024-11-07 PROBLEM — M51.369 DEGENERATION OF LUMBAR INTERVERTEBRAL DISC: Status: ACTIVE | Noted: 2024-11-07

## 2024-11-07 PROBLEM — M54.16 LUMBAR RADICULOPATHY: Status: ACTIVE | Noted: 2024-11-07

## 2024-11-07 PROCEDURE — 99213 OFFICE O/P EST LOW 20 MIN: CPT

## 2024-11-07 PROCEDURE — G2211 COMPLEX E/M VISIT ADD ON: HCPCS

## 2024-11-07 RX ORDER — DEXTROMETHORPHAN HYDROBROMIDE AND PROMETHAZINE HYDROCHLORIDE 15; 6.25 MG/5ML; MG/5ML
5 SYRUP ORAL 4 TIMES DAILY PRN
Qty: 473 ML | Refills: 0 | Status: SHIPPED | OUTPATIENT
Start: 2024-11-07

## 2024-11-07 RX ORDER — GUAIFENESIN 600 MG/1
1200 TABLET, EXTENDED RELEASE ORAL 2 TIMES DAILY
Qty: 20 TABLET | Refills: 0 | Status: CANCELLED | OUTPATIENT
Start: 2024-11-07 | End: 2024-11-17

## 2024-11-07 RX ORDER — AZITHROMYCIN 250 MG/1
TABLET, FILM COATED ORAL
Qty: 6 TABLET | Refills: 0 | Status: SHIPPED | OUTPATIENT
Start: 2024-11-07 | End: 2024-11-12

## 2024-11-07 RX ORDER — BENZONATATE 100 MG/1
100 CAPSULE ORAL 3 TIMES DAILY PRN
Qty: 20 CAPSULE | Refills: 0 | Status: SHIPPED | OUTPATIENT
Start: 2024-11-07 | End: 2024-11-15

## 2024-11-07 NOTE — PROGRESS NOTES
INTERNAL MEDICINE FOLLOW-UP VISIT  [facility] - [facility] INTERNAL MEDICINE [address]    NAME: Mitchell Bland  AGE: 74 y.o. SEX: male  : 1950     DATE: 2024     Assessment and Plan:   1. Acute cough  COVID negative in office.  Due to progressing cough, will treat with azithromycin for 5 days.  Recommended using Tessalon Perles and promethazine cough syrup prior to bed.  Continue to increase fluid intake.  If you begin to experience any fevers greater than 103, chills, or worsening cough, notify the office.      No follow-ups on file.       Chief Complaint:     Chief Complaint   Patient presents with    Cough     Congestion- symptoms for the past two weeks.    Fatigue      History of Present Illness:   Patient is a 73 yo male that presents today for a cough that started around 2 weeks ago. He admits to a lack of appetite and thirst. He is sleeping okay. He has been taking tylenol with no relief. His wife did have COVID, but he did not test at home.     The following portions of the patient's history were reviewed and updated as appropriate: allergies, current medications, past family history, past medical history, past social history, past surgical history and problem list.     Review of Systems:     Review of Systems   Constitutional:  Positive for appetite change and fatigue. Negative for chills and fever.   HENT:  Positive for rhinorrhea, sinus pressure and sinus pain. Negative for dental problem, ear discharge, ear pain, sore throat and trouble swallowing.    Respiratory:  Positive for cough (Purulent) and wheezing. Negative for chest tightness and shortness of breath.    Cardiovascular:  Negative for chest pain.   Gastrointestinal:  Negative for abdominal pain, constipation, diarrhea, nausea and vomiting.   Genitourinary:  Negative for difficulty urinating.   Musculoskeletal:  Negative for arthralgias and myalgias.   Skin:  Negative for rash.   Neurological:  Positive for  dizziness, light-headedness and headaches.   Psychiatric/Behavioral:  Negative for sleep disturbance.         Past Medical History:     Past Medical History:   Diagnosis Date    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HCC)     large intestine    Allergic rhinitis     Anemia     Anxiety     Atherosclerosis     CAD (coronary artery disease)     Colitis     Depression     Diabetes mellitus (HCC)     Difficulty breathing     Gastritis     GERD (gastroesophageal reflux disease)     Hepatic cyst     Hyperlipidemia     Hypertension     IFG (impaired fasting glucose)     Low back pain 01/31/2016    shoveling snow      Myocardial infarction (HCC)     9 YEARS AGO    Psoriasis     Shoulder pain 08/01/2016    no injury          Current Medications:     Current Outpatient Medications:     albuterol (Ventolin HFA) 90 mcg/act inhaler, Inhale 2 puffs every 6 (six) hours as needed for wheezing, Disp: 18 g, Rfl: 5    aspirin 81 MG tablet, Take 81 mg by mouth daily., Disp: , Rfl:     atorvastatin (LIPITOR) 40 mg tablet, TAKE ONE TABLET BY MOUTH EVERY DAY, Disp: 90 tablet, Rfl: 3    betamethasone, augmented, (DIPROLENE) 0.05 % ointment, Apply topically 2 (two) times a day To psoriasis till clear, Disp: 50 g, Rfl: 3    calcipotriene (DOVONOX) 0.005 % ointment, Apply topically 2 (two) times a day, Disp: 60 g, Rfl: 3    chlorthalidone 25 mg tablet, TAKE ONE TABLET BY MOUTH EVERY DAY, Disp: 30 tablet, Rfl: 5    colchicine (COLCRYS) 0.6 mg tablet, Take 2 now, then 1 every 6 hours until gout resolves., Disp: 30 tablet, Rfl: 0    enalapril (VASOTEC) 10 mg tablet, TAKE ONE TABLET BY MOUTH TWICE A DAY, Disp: 180 tablet, Rfl: 1    levocetirizine (XYZAL) 5 MG tablet, Take 5 mg by mouth daily , Disp: , Rfl:     metoprolol succinate (TOPROL-XL) 25 mg 24 hr tablet, TAKE ONE TABLET BY MOUTH EVERY DAY, Disp: 90 tablet, Rfl: 1    montelukast (SINGULAIR) 10 mg tablet, TAKE ONE TABLET BY MOUTH EVERY DAY AT BEDTIME, Disp: 30 tablet, Rfl: 3    nitroglycerin (NITROSTAT) 0.4 mg SL  "tablet, Place 1 tablet (0.4 mg total) under the tongue every 5 (five) minutes as needed for chest pain, Disp: 30 tablet, Rfl: 1    omeprazole (PriLOSEC) 20 mg delayed release capsule, TAKE ONE CAPSULE BY MOUTH EVERY DAY, Disp: 30 capsule, Rfl: 5    PARoxetine (PAXIL) 30 mg tablet, TAKE ONE TABLET BY MOUTH EVERY DAY, Disp: 30 tablet, Rfl: 5    predniSONE 10 mg tablet, Take 4 tablets daily until gout flare resolves, then taper to 30 mg for 1 day, 20 mg for 1 day, then 10 mg for 1 day. (Patient not taking: Reported on 11/7/2024), Disp: 28 tablet, Rfl: 0     Allergies:     Allergies   Allergen Reactions    Penicillins Hives and Other (See Comments)        Physical Exam:     /66 (BP Location: Left arm, Patient Position: Sitting, Cuff Size: Standard)   Pulse 76   Temp (!) 97.3 °F (36.3 °C) (Tympanic)   Resp 18   Ht 5' 10\" (1.778 m)   Wt 90.7 kg (200 lb)   SpO2 98%   BMI 28.70 kg/m²     Physical Exam  Vitals and nursing note reviewed.   Constitutional:       General: He is awake.      Appearance: Normal appearance. He is well-developed, well-groomed and overweight.   HENT:      Head: Normocephalic and atraumatic.      Right Ear: Hearing and external ear normal. There is impacted cerumen.      Left Ear: Hearing and external ear normal. There is impacted cerumen.      Nose: Nose normal.      Mouth/Throat:      Lips: Pink.      Mouth: Mucous membranes are moist.      Pharynx: Uvula midline. No posterior oropharyngeal erythema.   Eyes:      General: Lids are normal. Vision grossly intact. Gaze aligned appropriately.      Conjunctiva/sclera: Conjunctivae normal.   Neck:      Vascular: No carotid bruit.      Trachea: Trachea and phonation normal.   Cardiovascular:      Rate and Rhythm: Normal rate and regular rhythm.      Heart sounds: Normal heart sounds, S1 normal and S2 normal. No murmur heard.     No friction rub. No gallop.   Pulmonary:      Effort: Pulmonary effort is normal.      Breath sounds: Normal breath " sounds and air entry. No decreased breath sounds, wheezing, rhonchi or rales.   Abdominal:      General: Abdomen is protuberant.   Musculoskeletal:      Cervical back: Neck supple.      Right lower leg: No edema.      Left lower leg: No edema.   Skin:     General: Skin is warm.      Capillary Refill: Capillary refill takes less than 2 seconds.   Neurological:      Mental Status: He is alert.   Psychiatric:         Attention and Perception: Attention and perception normal.         Mood and Affect: Mood and affect normal.         Speech: Speech normal.         Behavior: Behavior normal. Behavior is cooperative.         Thought Content: Thought content normal.         Cognition and Memory: Cognition and memory normal.         Judgment: Judgment normal.           Data:     Laboratory Results: I have personally reviewed the pertinent laboratory results/reports   Radiology/Other Diagnostic Testing Results: Results Review Statement: No pertinent imaging studies reviewed.    Meg Alcantar PA-C  [facility] INTERNAL MEDICINE [address]

## 2024-11-07 NOTE — TELEPHONE ENCOUNTER
Wife called and was informed patient should have an appointment. Pt scheduled today with Meg Alcantar at 2:20 pm. Nothing available with Dr. Camilo

## 2024-11-11 ENCOUNTER — TELEPHONE (OUTPATIENT)
Age: 74
End: 2024-11-11

## 2024-11-11 NOTE — TELEPHONE ENCOUNTER
Received call from pt stating that the gout in his left foot is flaring up. It started today.  His pain is an 8 to 9 when walking and a 2 to 3 when siting.  Pt is requesting a steroid be called in to his preferred pharmacy. Pt has a previously scheduled appt on 11/15/24 with Meg PATINO.  Please review and advise.

## 2024-11-12 DIAGNOSIS — M79.674 GREAT TOE PAIN, RIGHT: Primary | ICD-10-CM

## 2024-11-12 RX ORDER — PREDNISONE 10 MG/1
TABLET ORAL
Qty: 28 TABLET | Refills: 0 | Status: SHIPPED | OUTPATIENT
Start: 2024-11-12

## 2024-11-15 ENCOUNTER — OFFICE VISIT (OUTPATIENT)
Age: 74
End: 2024-11-15
Payer: COMMERCIAL

## 2024-11-15 VITALS
SYSTOLIC BLOOD PRESSURE: 124 MMHG | DIASTOLIC BLOOD PRESSURE: 66 MMHG | OXYGEN SATURATION: 98 % | HEART RATE: 62 BPM | BODY MASS INDEX: 28.77 KG/M2 | WEIGHT: 201 LBS | HEIGHT: 70 IN | RESPIRATION RATE: 16 BRPM

## 2024-11-15 DIAGNOSIS — F32.5 MAJOR DEPRESSIVE DISORDER WITH SINGLE EPISODE, IN FULL REMISSION (HCC): ICD-10-CM

## 2024-11-15 DIAGNOSIS — E11.9 DIABETES MELLITUS IN REMISSION (HCC): ICD-10-CM

## 2024-11-15 DIAGNOSIS — Z98.61 CAD S/P PERCUTANEOUS CORONARY ANGIOPLASTY: ICD-10-CM

## 2024-11-15 DIAGNOSIS — M10.371 ACUTE GOUT DUE TO RENAL IMPAIRMENT INVOLVING TOE OF RIGHT FOOT: ICD-10-CM

## 2024-11-15 DIAGNOSIS — I25.10 CAD S/P PERCUTANEOUS CORONARY ANGIOPLASTY: ICD-10-CM

## 2024-11-15 DIAGNOSIS — C18.9 MALIGNANT NEOPLASM OF COLON, UNSPECIFIED PART OF COLON (HCC): ICD-10-CM

## 2024-11-15 DIAGNOSIS — E78.2 MIXED HYPERLIPIDEMIA: ICD-10-CM

## 2024-11-15 DIAGNOSIS — Z00.00 MEDICARE ANNUAL WELLNESS VISIT, SUBSEQUENT: Primary | ICD-10-CM

## 2024-11-15 DIAGNOSIS — I10 PRIMARY HYPERTENSION: ICD-10-CM

## 2024-11-15 DIAGNOSIS — M79.674 GREAT TOE PAIN, RIGHT: ICD-10-CM

## 2024-11-15 PROBLEM — R06.2 WHEEZING: Status: RESOLVED | Noted: 2023-11-08 | Resolved: 2024-11-15

## 2024-11-15 PROCEDURE — G0439 PPPS, SUBSEQ VISIT: HCPCS

## 2024-11-15 RX ORDER — ALLOPURINOL 100 MG/1
TABLET ORAL
Qty: 90 TABLET | Refills: 0 | Status: SHIPPED | OUTPATIENT
Start: 2024-11-15

## 2024-11-15 RX ORDER — COLCHICINE 0.6 MG/1
0.6 TABLET ORAL DAILY
Qty: 90 TABLET | Refills: 0 | Status: SHIPPED | OUTPATIENT
Start: 2024-11-15

## 2024-11-15 NOTE — ASSESSMENT & PLAN NOTE
Due for A1c.  He is currently managed on no medications.  Continue to limit sugars and carbs in diet.  Lab Results   Component Value Date    HGBA1C 6.8 (H) 04/30/2024

## 2024-11-15 NOTE — ASSESSMENT & PLAN NOTE
Currently on a daily baby aspirin.  He is s/p PCI in 2007, s/p CABG x 2 in 2008 at [facility]. He denies any anginal symptoms.  He follows with cardiology.

## 2024-11-15 NOTE — ASSESSMENT & PLAN NOTE
BP in office is 124/66.  No home BPs. Limit salt intake to <2,000 mg daily.  He is currently on chlorthalidone 25 mg, enalapril 10 mg, and metoprolol 25 mg daily.

## 2024-11-15 NOTE — PROGRESS NOTES
Name: Mitchell Bland      : 1950      MRN: [id number]  Encounter Provider: Meg Alcantar PA-C  Encounter Date: 11/15/2024   Encounter department: [facility] INTERNAL MEDICINE [facility] [address]    Assessment & Plan  Medicare annual wellness visit, subsequent  He eats a well-balanced diet of fruits, veggies, and lean meats. Recommended increasing water intake, he should be urinating pale to clear yellow every 2-3 hours. He is active around the house and works on cars. He sleeps well. He drinks alcohol socially. He denies any illicit drug use or tobacco use. He is UTD with dentist and eye doctor. He lives at home with his wife, [place]. He is still working doing his own upholstery on cars. He is going to his step sons for thanksgiving.        CAD S/P percutaneous coronary angioplasty  Currently on a daily baby aspirin.  He is s/p PCI in , s/p CABG x 2 in  at [facility]. He denies any anginal symptoms.  He follows with cardiology.       Primary hypertension  BP in office is 124/66.  No home BPs. Limit salt intake to <2,000 mg daily.  He is currently on chlorthalidone 25 mg, enalapril 10 mg, and metoprolol 25 mg daily.       Malignant neoplasm of colon, unspecified part of colon (HCC)  Recent colonoscopy was stable, he is due for a follow-up 3/27.       Diabetes mellitus in remission (HCC)  Due for A1c.  He is currently managed on no medications.  Continue to limit sugars and carbs in diet.  Lab Results   Component Value Date    HGBA1C 6.8 (H) 2024          Major depressive disorder with single episode, in full remission (HCC)  He is currently stable on Paxil 30 mg every other day.        Mixed hyperlipidemia  Due for lipid panel.  He is currently on atorvastatin 40 mg daily.       Acute gout due to renal impairment involving toe of right foot  Due for uric acid.  He is currently on his last day of a prednisone taper from recent gout flare. Recommended starting daily Colcrys and a maintenance  medication.  Will start allopurinol in 2 weeks after flareup is completely gone. Taper as follows: allopurinol 100 mg for 2 weeks, then 200 mg for 2 weeks, then 300 mg for 3 weeks in addition to a daily Colcrys.  Will check uric acid and BMP 2 weeks after being on 300 mg of allopurinol.  Orders:    Basic metabolic panel; Future    Uric acid; Future    allopurinol (ZYLOPRIM) 100 mg tablet; Start allopurinol 100 mg for 2 weeks, then 200 mg for 2 weeks, then 300 mg daily.      Depression Screening and Follow-up Plan: Patient was screened for depression during today's encounter. They screened negative with a PHQ-9 score of 0.      Preventive health issues were discussed with patient, and age appropriate screening tests were ordered as noted in patient's After Visit Summary. Personalized health advice and appropriate referrals for health education or preventive services given if needed, as noted in patient's After Visit Summary.    History of Present Illness     Patient is a 74-year-old male that presents today for his annual Medicare wellness visit.  He has no new complaints today.    Health maintenance:  Due for labs, diabetic foot exam, and diabetic eye exam.  Immunizations: Due for COVID and flu vaccines.       Patient Care Team:  Ethan Camilo MD as PCP - General  MD Alhaji Sr MD as Endoscopist  Meg Alcantar PA-C as Physician Assistant (Physician Assistant)    Review of Systems   Constitutional:  Negative for chills, fatigue and fever.   HENT:  Negative for ear discharge, ear pain, postnasal drip, rhinorrhea, sinus pressure, sinus pain, sore throat, tinnitus and trouble swallowing.    Eyes:  Negative for pain, discharge and itching.   Respiratory:  Negative for cough, shortness of breath and wheezing.    Cardiovascular:  Negative for chest pain, palpitations and leg swelling.   Gastrointestinal:  Negative for abdominal pain, constipation, diarrhea, nausea and vomiting.   Endocrine:  Negative for polydipsia, polyphagia and polyuria.   Genitourinary:  Negative for difficulty urinating, frequency, hematuria and urgency.   Musculoskeletal:  Positive for arthralgias. Negative for joint swelling and myalgias.   Skin:  Negative for color change.   Allergic/Immunologic: Negative for environmental allergies.   Neurological:  Negative for dizziness, weakness, light-headedness, numbness and headaches.   Hematological:  Negative for adenopathy.   Psychiatric/Behavioral:  Negative for decreased concentration and sleep disturbance. The patient is not nervous/anxious.      Medical History Reviewed by provider this encounter:       Annual Wellness Visit Questionnaire   Mitchell is here for his Subsequent Wellness visit. Last Medicare Wellness visit information reviewed, patient interviewed and updates made to the record today.      Health Risk Assessment:   Patient rates overall health as good. Patient feels that their physical health rating is same. Patient is satisfied with their life. Eyesight was rated as same. Hearing was rated as same. Patient feels that their emotional and mental health rating is slightly better. Patients states they are never, rarely angry. Patient states they are sometimes unusually tired/fatigued. Pain experienced in the last 7 days has been some. Patient's pain rating has been 5/10. Patient states that he has experienced no weight loss or gain in last 6 months.     Depression Screening:   PHQ-9 Score: 0      Fall Risk Screening:   In the past year, patient has experienced: no history of falling in past year      Home Safety:  Patient does not have trouble with stairs inside or outside of their home. Patient has working smoke alarms and has no working carbon monoxide detector. Home safety hazards include: none.     Nutrition:   Current diet is Frequent junk food.     Medications:   Patient is not currently taking any over-the-counter supplements. Patient is able to manage medications.      Activities of Daily Living (ADLs)/Instrumental Activities of Daily Living (IADLs):   Walk and transfer into and out of bed and chair?: Yes  Dress and groom yourself?: Yes    Bathe or shower yourself?: Yes    Feed yourself? Yes  Do your laundry/housekeeping?: Yes  Manage your money, pay your bills and track your expenses?: Yes  Make your own meals?: Yes    Do your own shopping?: Yes    Previous Hospitalizations:   Any hospitalizations or ED visits within the last 12 months?: No      Advance Care Planning:   Living will: No    Durable POA for healthcare: No    Advanced directive: No    Advanced directive counseling given: Yes    ACP document given: Yes      PREVENTIVE SCREENINGS      Cardiovascular Screening:    General: History Lipid Disorder and Risks and Benefits Discussed    Due for: Lipid Panel      Diabetes Screening:     General: History Diabetes and Risks and Benefits Discussed    Due for: Blood Glucose      Colorectal Cancer Screening:     General: Screening Current and History Colorectal Cancer      Prostate Cancer Screening:    General: Screening Not Indicated      Osteoporosis Screening:    General: Screening Not Indicated      Abdominal Aortic Aneurysm (AAA) Screening:        General: Screening Not Indicated      Lung Cancer Screening:     General: Screening Not Indicated      Hepatitis C Screening:    General: Screening Current    Screening, Brief Intervention, and Referral to Treatment (SBIRT)    Screening  Typical number of drinks in a day: 0  Typical number of drinks in a week: 0  Interpretation: Low risk drinking behavior.    AUDIT-C Screenin) How often did you have a drink containing alcohol in the past year? 2 to 4 times a month  2) How many drinks did you have on a typical day when you were drinking in the past year? 1 to 2  3) How often did you have 6 or more drinks on one occasion in the past year? never    AUDIT-C Score: 2  Interpretation: Score 0-3 (male): Negative screen for alcohol  "misuse    Single Item Drug Screening:  How often have you used an illegal drug (including marijuana) or a prescription medication for non-medical reasons in the past year? never    Single Item Drug Screen Score: 0  Interpretation: Negative screen for possible drug use disorder    Social Drivers of Health     Financial Resource Strain: Low Risk  (11/7/2023)    Overall Financial Resource Strain (CARDIA)     Difficulty of Paying Living Expenses: Not very hard   Food Insecurity: No Food Insecurity (11/14/2024)    Hunger Vital Sign     Worried About Running Out of Food in the Last Year: Never true     Ran Out of Food in the Last Year: Never true   Transportation Needs: No Transportation Needs (11/14/2024)    PRAPARE - Transportation     Lack of Transportation (Medical): No     Lack of Transportation (Non-Medical): No   Housing Stability: Unknown (11/14/2024)    Housing Stability Vital Sign     Unable to Pay for Housing in the Last Year: No     Homeless in the Last Year: No   Utilities: At Risk (11/14/2024)    [facility]     Threatened with loss of utilities: Yes     No results found.    Objective   /66 (BP Location: Left arm, Patient Position: Sitting)   Pulse 62   Resp 16   Ht 5' 10\" (1.778 m)   Wt 91.2 kg (201 lb)   SpO2 98%   BMI 28.84 kg/m²     Physical Exam  Vitals and nursing note reviewed.   Constitutional:       General: He is awake.      Appearance: Normal appearance. He is well-developed, well-groomed and overweight.   HENT:      Head: Normocephalic and atraumatic.      Right Ear: Hearing and external ear normal.      Left Ear: Hearing and external ear normal.      Nose: Nose normal.      Mouth/Throat:      Lips: Pink.      Mouth: Mucous membranes are moist.   Eyes:      General: Lids are normal. Vision grossly intact. Gaze aligned appropriately.      Conjunctiva/sclera: Conjunctivae normal.   Neck:      Vascular: No carotid bruit.      Trachea: Trachea and phonation normal.   Cardiovascular:     "  Rate and Rhythm: Normal rate and regular rhythm.      Heart sounds: Normal heart sounds, S1 normal and S2 normal. No murmur heard.     No friction rub. No gallop.   Pulmonary:      Effort: Pulmonary effort is normal.      Breath sounds: Normal breath sounds and air entry. No decreased breath sounds, wheezing, rhonchi or rales.   Abdominal:      General: Abdomen is protuberant.   Musculoskeletal:      Cervical back: Neck supple.      Right lower leg: No edema.      Left lower leg: No edema.   Skin:     General: Skin is warm.      Capillary Refill: Capillary refill takes less than 2 seconds.   Neurological:      Mental Status: He is alert.   Psychiatric:         Attention and Perception: Attention and perception normal.         Mood and Affect: Mood and affect normal.         Speech: Speech normal.         Behavior: Behavior normal. Behavior is cooperative.         Thought Content: Thought content normal.         Cognition and Memory: Cognition and memory normal.         Judgment: Judgment normal.

## 2024-11-19 ENCOUNTER — APPOINTMENT (OUTPATIENT)
Dept: LAB | Facility: CLINIC | Age: 74
End: 2024-11-19
Payer: COMMERCIAL

## 2024-11-19 DIAGNOSIS — E78.2 MIXED HYPERLIPIDEMIA: ICD-10-CM

## 2024-11-19 DIAGNOSIS — R53.83 OTHER FATIGUE: ICD-10-CM

## 2024-11-19 DIAGNOSIS — M79.674 GREAT TOE PAIN, RIGHT: ICD-10-CM

## 2024-11-19 DIAGNOSIS — E11.9 DIABETES MELLITUS IN REMISSION (HCC): ICD-10-CM

## 2024-11-19 LAB
ALBUMIN SERPL BCG-MCNC: 3.9 G/DL (ref 3.5–5)
ALP SERPL-CCNC: 67 U/L (ref 34–104)
ALT SERPL W P-5'-P-CCNC: 19 U/L (ref 7–52)
ANION GAP SERPL CALCULATED.3IONS-SCNC: 11 MMOL/L (ref 4–13)
AST SERPL W P-5'-P-CCNC: 20 U/L (ref 13–39)
BASOPHILS # BLD AUTO: 0.04 THOUSANDS/ÂΜL (ref 0–0.1)
BASOPHILS NFR BLD AUTO: 0 % (ref 0–1)
BILIRUB SERPL-MCNC: 0.71 MG/DL (ref 0.2–1)
BUN SERPL-MCNC: 18 MG/DL (ref 5–25)
CALCIUM SERPL-MCNC: 8.8 MG/DL (ref 8.4–10.2)
CHLORIDE SERPL-SCNC: 102 MMOL/L (ref 96–108)
CHOLEST SERPL-MCNC: 137 MG/DL (ref ?–200)
CO2 SERPL-SCNC: 29 MMOL/L (ref 21–32)
CREAT SERPL-MCNC: 1.12 MG/DL (ref 0.6–1.3)
EOSINOPHIL # BLD AUTO: 0.42 THOUSAND/ÂΜL (ref 0–0.61)
EOSINOPHIL NFR BLD AUTO: 4 % (ref 0–6)
ERYTHROCYTE [DISTWIDTH] IN BLOOD BY AUTOMATED COUNT: 12.9 % (ref 11.6–15.1)
EST. AVERAGE GLUCOSE BLD GHB EST-MCNC: 146 MG/DL
GFR SERPL CREATININE-BSD FRML MDRD: 63 ML/MIN/1.73SQ M
GLUCOSE P FAST SERPL-MCNC: 111 MG/DL (ref 65–99)
HBA1C MFR BLD: 6.7 %
HCT VFR BLD AUTO: 39.6 % (ref 36.5–49.3)
HDLC SERPL-MCNC: 30 MG/DL
HGB BLD-MCNC: 13.2 G/DL (ref 12–17)
IMM GRANULOCYTES # BLD AUTO: 0.11 THOUSAND/UL (ref 0–0.2)
IMM GRANULOCYTES NFR BLD AUTO: 1 % (ref 0–2)
LDLC SERPL CALC-MCNC: 40 MG/DL (ref 0–100)
LYMPHOCYTES # BLD AUTO: 2.04 THOUSANDS/ÂΜL (ref 0.6–4.47)
LYMPHOCYTES NFR BLD AUTO: 19 % (ref 14–44)
MCH RBC QN AUTO: 28.5 PG (ref 26.8–34.3)
MCHC RBC AUTO-ENTMCNC: 33.3 G/DL (ref 31.4–37.4)
MCV RBC AUTO: 86 FL (ref 82–98)
MONOCYTES # BLD AUTO: 0.77 THOUSAND/ÂΜL (ref 0.17–1.22)
MONOCYTES NFR BLD AUTO: 7 % (ref 4–12)
NEUTROPHILS # BLD AUTO: 7.31 THOUSANDS/ÂΜL (ref 1.85–7.62)
NEUTS SEG NFR BLD AUTO: 69 % (ref 43–75)
NRBC BLD AUTO-RTO: 0 /100 WBCS
PLATELET # BLD AUTO: 305 THOUSANDS/UL (ref 149–390)
PMV BLD AUTO: 10.4 FL (ref 8.9–12.7)
POTASSIUM SERPL-SCNC: 4.1 MMOL/L (ref 3.5–5.3)
PROT SERPL-MCNC: 6.5 G/DL (ref 6.4–8.4)
RBC # BLD AUTO: 4.63 MILLION/UL (ref 3.88–5.62)
SODIUM SERPL-SCNC: 142 MMOL/L (ref 135–147)
TRIGL SERPL-MCNC: 333 MG/DL (ref ?–150)
TSH SERPL DL<=0.05 MIU/L-ACNC: 2.64 UIU/ML (ref 0.45–4.5)
URATE SERPL-MCNC: 11.3 MG/DL (ref 3.5–8.5)
WBC # BLD AUTO: 10.69 THOUSAND/UL (ref 4.31–10.16)

## 2024-11-19 PROCEDURE — 83036 HEMOGLOBIN GLYCOSYLATED A1C: CPT

## 2024-11-19 PROCEDURE — 84443 ASSAY THYROID STIM HORMONE: CPT

## 2024-11-19 PROCEDURE — 80053 COMPREHEN METABOLIC PANEL: CPT

## 2024-11-19 PROCEDURE — 80061 LIPID PANEL: CPT

## 2024-11-19 PROCEDURE — 36415 COLL VENOUS BLD VENIPUNCTURE: CPT

## 2024-11-19 PROCEDURE — 85025 COMPLETE CBC W/AUTO DIFF WBC: CPT

## 2024-11-19 PROCEDURE — 84550 ASSAY OF BLOOD/URIC ACID: CPT

## 2024-11-20 ENCOUNTER — RESULTS FOLLOW-UP (OUTPATIENT)
Age: 74
End: 2024-11-20

## 2024-12-12 DIAGNOSIS — M79.674 GREAT TOE PAIN, RIGHT: ICD-10-CM

## 2024-12-12 RX ORDER — PREDNISONE 10 MG/1
TABLET ORAL
Qty: 28 TABLET | Refills: 0 | Status: SHIPPED | OUTPATIENT
Start: 2024-12-12

## 2024-12-31 DIAGNOSIS — I10 ESSENTIAL HYPERTENSION: ICD-10-CM

## 2025-01-02 RX ORDER — ENALAPRIL MALEATE 10 MG/1
10 TABLET ORAL 2 TIMES DAILY
Qty: 180 TABLET | Refills: 0 | Status: SHIPPED | OUTPATIENT
Start: 2025-01-02

## 2025-01-08 DIAGNOSIS — E78.2 MIXED HYPERLIPIDEMIA: ICD-10-CM

## 2025-01-09 RX ORDER — ATORVASTATIN CALCIUM 40 MG/1
40 TABLET, FILM COATED ORAL DAILY
Qty: 90 TABLET | Refills: 1 | Status: SHIPPED | OUTPATIENT
Start: 2025-01-09

## 2025-01-17 ENCOUNTER — OFFICE VISIT (OUTPATIENT)
Age: 75
End: 2025-01-17
Payer: COMMERCIAL

## 2025-01-17 VITALS
TEMPERATURE: 98.2 F | BODY MASS INDEX: 28.77 KG/M2 | HEIGHT: 70 IN | DIASTOLIC BLOOD PRESSURE: 78 MMHG | SYSTOLIC BLOOD PRESSURE: 130 MMHG | OXYGEN SATURATION: 98 % | HEART RATE: 78 BPM | WEIGHT: 201 LBS

## 2025-01-17 DIAGNOSIS — A08.4 VIRAL GASTROENTERITIS: Primary | ICD-10-CM

## 2025-01-17 PROCEDURE — G2211 COMPLEX E/M VISIT ADD ON: HCPCS

## 2025-01-17 PROCEDURE — 99213 OFFICE O/P EST LOW 20 MIN: CPT

## 2025-01-17 NOTE — PROGRESS NOTES
Name: Mitchell Bland      : 1950      MRN: [id number]  Encounter Provider: Jeanette Li PA-C  Encounter Date: 2025   Encounter department: [facility] INTERNAL MEDICINE [facility] [address]  :  Assessment & Plan  Viral gastroenteritis  Appears viral in nature.  Recommended adequate fluid intake/electrolyte intake via Gatorade/Powerade/Pedialyte.  Brat diet as tolerated.  Discussed that this has to run its course and he should avoid using any antidiarrheal agents or antiemetic agents.  Discussed that this may take multiple days to run its course-advised he seek immediate ED evaluation if he develops increased weakness/shortness of breath/dizziness/lightheadedness.              History of Present Illness     Patient is a 75 year old male presenting for an acute visit.  Starting yesterday-had vomited 3-4 times.  Diarrhea- numerous episodes of diarrhea over the last 48 hours-still feels like he can go right now  Denies any fevers, SOB, body aches, CP, vision changes  Diarrhea is watery-no blood    Vomiting   Associated symptoms include diarrhea. Pertinent negatives include no abdominal pain, arthralgias, chest pain, chills, coughing, dizziness, fever, headaches or myalgias.     Review of Systems   Constitutional:  Negative for chills, fatigue and fever.   HENT:  Negative for ear discharge, ear pain, postnasal drip, rhinorrhea, sinus pressure, sinus pain, sore throat, tinnitus and trouble swallowing.    Eyes:  Negative for pain, discharge and itching.   Respiratory:  Negative for cough, shortness of breath and wheezing.    Cardiovascular:  Negative for chest pain, palpitations and leg swelling.   Gastrointestinal:  Positive for diarrhea, nausea and vomiting. Negative for abdominal pain and constipation.   Endocrine: Negative for polydipsia, polyphagia and polyuria.   Genitourinary:  Negative for difficulty urinating, frequency, hematuria and urgency.   Musculoskeletal:  Negative for arthralgias, joint  "swelling and myalgias.   Skin:  Negative for color change.   Allergic/Immunologic: Negative for environmental allergies.   Neurological:  Negative for dizziness, weakness, light-headedness, numbness and headaches.   Hematological:  Negative for adenopathy.   Psychiatric/Behavioral:  Negative for decreased concentration and sleep disturbance. The patient is not nervous/anxious.        Objective   /78   Pulse 78   Temp 98.2 °F (36.8 °C)   Ht 5' 10\" (1.778 m)   Wt 91.2 kg (201 lb)   SpO2 98%   BMI 28.84 kg/m²      Physical Exam  Vitals and nursing note reviewed.   Constitutional:       General: He is awake.      Appearance: Normal appearance. He is well-developed, well-groomed and overweight.   HENT:      Head: Normocephalic and atraumatic.      Right Ear: Hearing and external ear normal.      Left Ear: Hearing and external ear normal.      Nose: Nose normal.      Mouth/Throat:      Lips: Pink.      Mouth: Mucous membranes are moist.   Eyes:      General: Lids are normal. Vision grossly intact. Gaze aligned appropriately.      Conjunctiva/sclera: Conjunctivae normal.   Neck:      Vascular: No carotid bruit.      Trachea: Trachea and phonation normal.   Cardiovascular:      Rate and Rhythm: Normal rate and regular rhythm.      Heart sounds: Normal heart sounds, S1 normal and S2 normal. No murmur heard.     No friction rub. No gallop.   Pulmonary:      Effort: Pulmonary effort is normal.      Breath sounds: Normal breath sounds and air entry. No decreased breath sounds, wheezing, rhonchi or rales.   Abdominal:      General: Abdomen is protuberant.      Palpations: Abdomen is soft.   Musculoskeletal:         General: Normal range of motion.      Cervical back: Neck supple.      Right lower leg: No edema.      Left lower leg: No edema.   Skin:     General: Skin is warm.      Capillary Refill: Capillary refill takes less than 2 seconds.   Neurological:      Mental Status: He is alert.   Psychiatric:         " Attention and Perception: Attention and perception normal.         Mood and Affect: Mood and affect normal.         Speech: Speech normal.         Behavior: Behavior normal. Behavior is cooperative.         Thought Content: Thought content normal.         Cognition and Memory: Cognition and memory normal.         Judgment: Judgment normal.

## 2025-02-06 DIAGNOSIS — I10 ESSENTIAL HYPERTENSION: ICD-10-CM

## 2025-02-06 RX ORDER — CHLORTHALIDONE 25 MG/1
25 TABLET ORAL DAILY
Qty: 30 TABLET | Refills: 0 | Status: SHIPPED | OUTPATIENT
Start: 2025-02-06

## 2025-02-21 ENCOUNTER — OFFICE VISIT (OUTPATIENT)
Age: 75
End: 2025-02-21
Payer: COMMERCIAL

## 2025-02-21 VITALS
HEIGHT: 70 IN | DIASTOLIC BLOOD PRESSURE: 70 MMHG | SYSTOLIC BLOOD PRESSURE: 128 MMHG | BODY MASS INDEX: 28.63 KG/M2 | WEIGHT: 200 LBS | OXYGEN SATURATION: 99 % | HEART RATE: 67 BPM

## 2025-02-21 DIAGNOSIS — K51.40 PSEUDOPOLYPOSIS OF COLON WITHOUT COMPLICATION, UNSPECIFIED PART OF COLON (HCC): ICD-10-CM

## 2025-02-21 DIAGNOSIS — S16.1XXA STRAIN OF NECK MUSCLE, INITIAL ENCOUNTER: Primary | ICD-10-CM

## 2025-02-21 DIAGNOSIS — Z79.4 TYPE 2 DIABETES MELLITUS WITHOUT COMPLICATION, WITH LONG-TERM CURRENT USE OF INSULIN (HCC): ICD-10-CM

## 2025-02-21 DIAGNOSIS — E11.9 TYPE 2 DIABETES MELLITUS WITHOUT COMPLICATION, WITH LONG-TERM CURRENT USE OF INSULIN (HCC): ICD-10-CM

## 2025-02-21 DIAGNOSIS — M10.371 ACUTE GOUT DUE TO RENAL IMPAIRMENT INVOLVING TOE OF RIGHT FOOT: ICD-10-CM

## 2025-02-21 DIAGNOSIS — R53.83 OTHER FATIGUE: ICD-10-CM

## 2025-02-21 DIAGNOSIS — Z13.220 SCREENING FOR LIPID DISORDERS: ICD-10-CM

## 2025-02-21 DIAGNOSIS — C18.9 MALIGNANT NEOPLASM OF COLON, UNSPECIFIED PART OF COLON (HCC): ICD-10-CM

## 2025-02-21 DIAGNOSIS — Z13.0 SCREENING FOR DEFICIENCY ANEMIA: ICD-10-CM

## 2025-02-21 DIAGNOSIS — R73.01 IMPAIRED FASTING GLUCOSE: ICD-10-CM

## 2025-02-21 PROCEDURE — G2211 COMPLEX E/M VISIT ADD ON: HCPCS

## 2025-02-21 PROCEDURE — 99214 OFFICE O/P EST MOD 30 MIN: CPT

## 2025-02-21 RX ORDER — METHOCARBAMOL 500 MG/1
500 TABLET, FILM COATED ORAL 4 TIMES DAILY
Qty: 40 TABLET | Refills: 0 | Status: SHIPPED | OUTPATIENT
Start: 2025-02-21 | End: 2025-03-03

## 2025-02-21 NOTE — PROGRESS NOTES
Name: Mitchell Bland      : 1950      MRN: [id number]  Encounter Provider: Meg Alcantar PA-C  Encounter Date: 2025   Encounter [facility]  :  Assessment & Plan  Strain of neck muscle, initial encounter  Likely muscular in nature.  Recommended continuing applying heating pad, light stretching, Tylenol every 4-6 hours, and Robaxin 4 times daily as needed.  Reviewed potential adverse effects.  Notify the office of any worsening pain.  Orders:    methocarbamol (ROBAXIN) 500 mg tablet; Take 1 tablet (500 mg total) by mouth 4 (four) times a day for 10 days    Pseudopolyposis of colon without complication, unspecified part of colon (HCC)  He follows with GI.  Last colonoscopy was 2024.       Malignant neoplasm of colon, unspecified part of colon (HCC)  He follows with GI.  Last colonoscopy was 2024.       Type 2 diabetes mellitus without complication, with long-term current use of insulin (HCC)  Most recent A1c was stable.  Lab Results   Component Value Date    HGBA1C 6.7 (H) 2024          Screening for deficiency anemia    Orders:    CBC and differential; Future    Impaired fasting glucose    Orders:    Comprehensive metabolic panel; Future    Hemoglobin A1C; Future    Screening for lipid disorders    Orders:    Lipid panel; Future    Other fatigue    Orders:    TSH, 3rd generation with Free T4 reflex; Future    Acute gout due to renal impairment involving toe of right foot    Orders:    Uric acid; Future           History of Present Illness   Patient is a 76 yo male that presents today for midline neck pain.  This started 2 days ago after lifting something heavy.  He notes some radiation down his shoulders.  He is able to move his neck, but fast movements make it worse.  He is struggling to sleep. He has been using tylenol and heat with some relief.  He denies any numbness or tingling.      Review of Systems   Musculoskeletal:  Positive  "for myalgias, neck pain and neck stiffness.       Objective   /70   Pulse 67   Ht 5' 10\" (1.778 m)   Wt 90.7 kg (200 lb)   SpO2 99%   BMI 28.70 kg/m²      Physical Exam  Vitals and nursing note reviewed.   Constitutional:       General: He is awake.      Appearance: Normal appearance. He is well-developed, well-groomed and overweight.   HENT:      Head: Normocephalic and atraumatic.      Right Ear: Hearing and external ear normal.      Left Ear: Hearing and external ear normal.      Nose: Nose normal.      Mouth/Throat:      Lips: Pink.      Mouth: Mucous membranes are moist.   Eyes:      General: Lids are normal. Vision grossly intact. Gaze aligned appropriately.      Conjunctiva/sclera: Conjunctivae normal.   Neck:      Vascular: No carotid bruit.      Trachea: Trachea and phonation normal.   Pulmonary:      Effort: Pulmonary effort is normal.   Abdominal:      General: Abdomen is protuberant.   Musculoskeletal:      Cervical back: Neck supple. No rigidity. Pain with movement, spinous process tenderness and muscular tenderness present. Normal range of motion.   Skin:     General: Skin is warm.      Capillary Refill: Capillary refill takes less than 2 seconds.   Neurological:      Mental Status: He is alert.   Psychiatric:         Attention and Perception: Attention and perception normal.         Mood and Affect: Mood and affect normal.         Speech: Speech normal.         Behavior: Behavior normal. Behavior is cooperative.         Thought Content: Thought content normal.         Cognition and Memory: Cognition and memory normal.         Judgment: Judgment normal.         "

## 2025-03-05 ENCOUNTER — OFFICE VISIT (OUTPATIENT)
Dept: CARDIOLOGY CLINIC | Facility: CLINIC | Age: 75
End: 2025-03-05
Payer: COMMERCIAL

## 2025-03-05 VITALS
HEIGHT: 70 IN | BODY MASS INDEX: 28.63 KG/M2 | HEART RATE: 61 BPM | OXYGEN SATURATION: 96 % | WEIGHT: 200 LBS | SYSTOLIC BLOOD PRESSURE: 110 MMHG | RESPIRATION RATE: 16 BRPM | DIASTOLIC BLOOD PRESSURE: 66 MMHG

## 2025-03-05 DIAGNOSIS — I25.10 CAD S/P PERCUTANEOUS CORONARY ANGIOPLASTY: Primary | ICD-10-CM

## 2025-03-05 DIAGNOSIS — I10 PRIMARY HYPERTENSION: ICD-10-CM

## 2025-03-05 DIAGNOSIS — Z98.61 CAD S/P PERCUTANEOUS CORONARY ANGIOPLASTY: Primary | ICD-10-CM

## 2025-03-05 DIAGNOSIS — E78.2 MIXED HYPERLIPIDEMIA: ICD-10-CM

## 2025-03-05 DIAGNOSIS — Z95.1 HX OF CABG: ICD-10-CM

## 2025-03-05 DIAGNOSIS — I51.89 DIASTOLIC DYSFUNCTION: ICD-10-CM

## 2025-03-05 PROCEDURE — 99214 OFFICE O/P EST MOD 30 MIN: CPT | Performed by: INTERNAL MEDICINE

## 2025-03-05 NOTE — PROGRESS NOTES
CARDIOLOGY OFFICE VISIT  [facility]  [address] [place], [place] [address]  Tel: [phone]      NAME: Mitchell Bland  AGE: 75 y.o.  SEX: male  : 1950  MRN: [id number]      Chief Complaint:  Chief Complaint   Patient presents with    Follow-up         History of Present Illness:   Patient comes for follow up. States he is doing well from cardiac stand point and denies chest pain / pressure, SOB, palpitations, lightheadedness, syncope, swelling feet, orthopnea, PND, claudication.    CAD s/p PCI in , s/p CABG x2 in  at [facility]  -  States is doing well cardiac wise with no cardiac symptoms.  Taking all medications regularly.  Has not used SL NTG since the last clinic visit.    Primary hypertension, diastolic dysfunction-  Has been hypertensive for many years.  Taking medications regularly.  Denies lightheadedness, headache, medication side effects.      Mixed hyperlipidemia -  Has had hyperlipidemia for many years.  Taking statin regularly along with diet control.  Denies myalgia.  PCP closely monitoring the blood work.    Past Medical History:  Past Medical History:   Diagnosis Date    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HCC)     large intestine    Allergic rhinitis     Anemia     Anxiety     Atherosclerosis     CAD (coronary artery disease)     Colitis     Depression     Diabetes mellitus (HCC)     Difficulty breathing     Gastritis     GERD (gastroesophageal reflux disease)     Hepatic cyst     Hyperlipidemia     Hypertension     IFG (impaired fasting glucose)     Low back pain 2016    shoveling snow      Myocardial infarction (HCC)     9 YEARS AGO    Psoriasis     Shoulder pain 2016    no injury      Wheezing 2023         Past Surgical History:  Past Surgical History:   Procedure Laterality Date    ANGIOPLASTY      COLON SURGERY      CORONARY ARTERY BYPASS GRAFT      MN COLONOSCOPY FLX DX W/COLLJ SPEC WHEN PFRMD N/A 2017     Procedure: COLONOSCOPY;  Surgeon: Alhaji Amado MD;  Location: [facility];  Service: Gastroenterology    CO RMVL HEATHER CTR VAD W/SUBQ PORT/ CTR/PRPH INSJ Left 5/13/2016    Procedure: REMOVAL VENOUS PORT (PORT-A-CATH);  Surgeon: LONG Pulliam MD;  Location: [facility];  Service: Colorectal         Family History:  Family History   Problem Relation Age of Onset    Colon cancer Family     Diverticulosis Family         colonic    Cancer Father          Social History:  Social History     Socioeconomic History    Marital status: /Civil Union     Spouse name: None    Number of children: None    Years of education: None    Highest education level: None   Occupational History    None   Tobacco Use    Smoking status: Never    Smokeless tobacco: Never   Vaping Use    Vaping status: Never Used   Substance and Sexual Activity    Alcohol use: Yes     Comment: social    Drug use: No    Sexual activity: Not Currently     Partners: Female   Other Topics Concern    None   Social History Narrative    No advance directives     Social Drivers of Health     Financial Resource Strain: Low Risk  (11/7/2023)    Overall Financial Resource Strain (CARDIA)     Difficulty of Paying Living Expenses: Not very hard   Food Insecurity: No Food Insecurity (11/14/2024)    Hunger Vital Sign     Worried About Running Out of Food in the Last Year: Never true     Ran Out of Food in the Last Year: Never true   Transportation Needs: No Transportation Needs (11/14/2024)    PRAPARE - Transportation     Lack of Transportation (Medical): No     Lack of Transportation (Non-Medical): No   Physical Activity: Unknown (8/31/2022)    Exercise Vital Sign     Days of Exercise per Week: 0 days     Minutes of Exercise per Session: Not on file   Stress: No Stress Concern Present (9/23/2022)    [employer] - Occupational Stress Questionnaire     Feeling of Stress : Only a little   Social Connections: Not on file   Intimate  Partner Violence: Not on file   Housing Stability: Unknown (11/14/2024)    Housing Stability Vital Sign     Unable to Pay for Housing in the Last Year: No     Number of Times Moved in the Last Year: Not on file     Homeless in the Last Year: No         Active Problems:  Patient Active Problem List   Diagnosis    Colon cancer (HCC)    CAD S/P percutaneous coronary angioplasty    Gastritis    Hypertension    Hyperlipidemia    Hx of CABG    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)    Major depressive disorder with single episode, in full remission (HCC)    Diabetes mellitus in remission (HCC)    History of PTCA    DDD (degenerative disc disease), cervical    Degeneration of lumbar intervertebral disc    Full thickness rotator cuff tear    Lumbar radiculopathy    Pseudopolyposis of colon without complication, unspecified part of colon (HCC)         The following portions of the patient's history were reviewed and updated as appropriate: past medical history, past surgical history, past family history,  past social history, current medications, allergies and problem list.      Review of Systems:  Constitutional: Denies fever, chills  Eyes: Denies eye redness, eye discharge  ENT: Denies sneezing, nasal discharge, sore throat   Respiratory: Denies cough, expectoration, shortness of breath  Cardiovascular: Denies chest pain, palpitations, lower extremity swelling  Gastrointestinal: Denies abdominal pain, nausea, vomiting, diarrhea  Genito-Urinary: Denies dysuria, incontinence  Musculoskeletal: Denies muscle pain  Neurologic: Denies lightheadedness, syncope, headache, seizures  Endocrine: Denies polydipsia, temperature intolerance  Allergy and Immunology: Denies hives, insect bite sensitivity  Hematological and Lymphatic: Denies bleeding problems, swollen glands   Psychological: Denies depression, suicidal ideation, anxiety, panic  Dermatological: Denies pruritus, rash, skin lesion  changes      Vitals:  Vitals:    03/05/25 1112   BP: 110/66   Pulse: 61   Resp: 16   SpO2: 96%       Body mass index is 28.7 kg/m².    Weight (last 2 days)       Date/Time Weight    03/05/25 1112 90.7 (200)              Physical Examination:  General: Patient is not in acute distress. Awake, alert, oriented in time, place and person. Responding to commands  Head: Normocephalic. Atraumatic  Eyes: Both pupils normal sized, round and reactive to light. Nonicteric  ENT: Normal external ear canals  Neck: Supple. JVP not raised. Trachea central. No thyromegaly  Lungs: Bilateral bronchovascular breath sounds with no crackles or rhonchi  Chest wall: No tenderness  Cardiovascular: RRR. S1 and S2 normal. No murmur, rub or gallop  Gastrointestinal: Abdomen soft, nontender. No guarding or rigidity. Liver and spleen not palpable. Bowel sounds present  Neurologic: Patient is awake, alert, oriented in time, place and person. Responding to commands. Moving all extremities  Integumentary:  No skin rash  Lymphatic: No cervical lymphadenopathy  Back: Symmetric. No CVA tenderness  Extremities: No clubbing, cyanosis or edema      Laboratory Results:  CBC with diff:   Lab Results   Component Value Date    WBC 10.69 (H) 11/19/2024    WBC 6.1 03/16/2015    RBC 4.63 11/19/2024    RBC 4.91 03/16/2015    HGB 13.2 11/19/2024    HGB 14.7 03/16/2015    HCT 39.6 11/19/2024    HCT 42.0 03/16/2015    MCV 86 11/19/2024    MCV 85 03/16/2015    MCH 28.5 11/19/2024    MCH 29.9 03/16/2015    RDW 12.9 11/19/2024    RDW 12.0 03/16/2015     11/19/2024     03/16/2015       CMP:  Lab Results   Component Value Date    CREATININE 1.12 11/19/2024    CREATININE 1.1 03/16/2015    BUN 18 11/19/2024    BUN 12 03/16/2015     (H) 03/16/2015    K 4.1 11/19/2024    K 4.0 03/16/2015     11/19/2024     (H) 03/16/2015    CO2 29 11/19/2024    CO2 29.4 03/16/2015    GLUCOSE 104 03/16/2015    PROT 6.8 03/16/2015    ALKPHOS 67 11/19/2024     ALKPHOS 89 03/16/2015    ALT 19 11/19/2024    ALT 43 03/16/2015    AST 20 11/19/2024    AST 23 03/16/2015    BILIDIR 0.19 09/07/2018       Lab Results   Component Value Date    HGBA1C 6.7 (H) 11/19/2024    HGBA1C 5.1 03/16/2015    MG 2.0 04/01/2014    PHOS 2.4 (L) 04/01/2014         Lipid Profile:   Lab Results   Component Value Date    CHOL 129 03/16/2015    CHOL 148 02/12/2014     Lab Results   Component Value Date    HDL 30 (L) 11/19/2024    HDL 30 (L) 04/30/2024    HDL 34 (L) 11/20/2023     Lab Results   Component Value Date    LDLCALC 40 11/19/2024    LDLCALC 63 04/30/2024    LDLCALC 54 11/20/2023     Lab Results   Component Value Date    TRIG 333 (H) 11/19/2024    TRIG 277 (H) 04/30/2024    TRIG 248 (H) 11/20/2023       Medications:    Current Outpatient Medications:     albuterol (Ventolin HFA) 90 mcg/act inhaler, Inhale 2 puffs every 6 (six) hours as needed for wheezing, Disp: 18 g, Rfl: 5    allopurinol (ZYLOPRIM) 100 mg tablet, Start allopurinol 100 mg for 2 weeks, then 200 mg for 2 weeks, then 300 mg daily., Disp: 90 tablet, Rfl: 0    aspirin 81 MG tablet, Take 81 mg by mouth daily., Disp: , Rfl:     atorvastatin (LIPITOR) 40 mg tablet, TAKE ONE TABLET BY MOUTH EVERY DAY, Disp: 90 tablet, Rfl: 1    betamethasone, augmented, (DIPROLENE) 0.05 % ointment, Apply topically 2 (two) times a day To psoriasis till clear, Disp: 50 g, Rfl: 3    calcipotriene (DOVONOX) 0.005 % ointment, Apply topically 2 (two) times a day, Disp: 60 g, Rfl: 3    chlorthalidone 25 mg tablet, TAKE ONE TABLET BY MOUTH EVERY DAY, Disp: 30 tablet, Rfl: 0    colchicine (COLCRYS) 0.6 mg tablet, Take 1 tablet (0.6 mg total) by mouth daily Take 2 now, then 1 every 6 hours until gout resolves., Disp: 90 tablet, Rfl: 0    enalapril (VASOTEC) 10 mg tablet, TAKE ONE TABLET BY MOUTH TWICE A DAY, Disp: 180 tablet, Rfl: 0    levocetirizine (XYZAL) 5 MG tablet, Take 5 mg by mouth daily , Disp: , Rfl:     methocarbamol (ROBAXIN) 500 mg tablet, Take 1  tablet (500 mg total) by mouth 4 (four) times a day for 10 days, Disp: 40 tablet, Rfl: 0    metoprolol succinate (TOPROL-XL) 25 mg 24 hr tablet, TAKE ONE TABLET BY MOUTH EVERY DAY, Disp: 90 tablet, Rfl: 1    nitroglycerin (NITROSTAT) 0.4 mg SL tablet, Place 1 tablet (0.4 mg total) under the tongue every 5 (five) minutes as needed for chest pain, Disp: 30 tablet, Rfl: 1    omeprazole (PriLOSEC) 20 mg delayed release capsule, TAKE ONE CAPSULE BY MOUTH EVERY DAY, Disp: 30 capsule, Rfl: 5    PARoxetine (PAXIL) 30 mg tablet, TAKE ONE TABLET BY MOUTH EVERY DAY, Disp: 30 tablet, Rfl: 5    predniSONE 10 mg tablet, TAKE 4 TABLETS BY MOUTH DAILY UNTIL GOUT FLARE RESOLVES, THEN TAPER TO 30MG ( 3 TABS) FOR 1 DAY, 20MG (2 TABS) FOR 1 DAY, THEN 10MG (1 TAB) FOR 1 DAY, Disp: 28 tablet, Rfl: 0    promethazine-dextromethorphan (PHENERGAN-DM) 6.25-15 mg/5 mL oral syrup, Take 5 mL by mouth 4 (four) times a day as needed for cough, Disp: 473 mL, Rfl: 0      Allergies:  Allergies   Allergen Reactions    Penicillins Hives and Other (See Comments)         Assessment and Plan:  1. CAD S/P percutaneous coronary angioplasty (Primary)  2. Hx of CABG  Doing well.  Continue aspirin, statin, beta-blocker, as needed SL NTG  Follow-up echocardiogram ordered    3. Primary hypertension  Patient is stable.  Continue metoprolol succinate 25 mg daily, enalapril 10 mg twice daily, chlorthalidone 25 mg daily.  Continue to monitor BP at home and call if abnormal    4. Diastolic dysfunction  Tight BP control    5. Mixed hyperlipidemia  Continue diet control and atorvastatin 40 mg daily.  His PCP closely monitors his blood work    Recommend aggressive risk factor modification and therapeutic lifestyle changes.  Low-salt, low-calorie, low-fat, low-cholesterol diet with regular exercise and to optimize weight.  I will defer the ordering and monitoring of necessity lab studies to you, but I am available and happy to review and manage any of the data at your  request in the future.    Discussed concepts of atherosclerosis, including signs and symptoms of cardiac disease.    Previous studies were reviewed.    Safety measures were reviewed.  Questions were entertained and answered.  Patient was advised to report any problems requiring medical attention.    Follow-up with PCP and appropriate specialist and lab work as discussed.    Return for follow up visit as scheduled or earlier, if needed.  Thank you for allowing me to participate in the care and evaluation of your patient.  Should you have any questions, please feel free to contact me.    George Esteban MD  3/5/2025,11:37 AM

## 2025-03-14 DIAGNOSIS — I10 ESSENTIAL HYPERTENSION: ICD-10-CM

## 2025-03-14 RX ORDER — CHLORTHALIDONE 25 MG/1
25 TABLET ORAL DAILY
Qty: 30 TABLET | Refills: 5 | Status: SHIPPED | OUTPATIENT
Start: 2025-03-14

## 2025-03-19 DIAGNOSIS — I10 ESSENTIAL HYPERTENSION: ICD-10-CM

## 2025-03-20 RX ORDER — METOPROLOL SUCCINATE 25 MG/1
25 TABLET, EXTENDED RELEASE ORAL DAILY
Qty: 90 TABLET | Refills: 1 | Status: SHIPPED | OUTPATIENT
Start: 2025-03-20

## 2025-03-28 ENCOUNTER — OFFICE VISIT (OUTPATIENT)
Dept: OBGYN CLINIC | Facility: CLINIC | Age: 75
End: 2025-03-28
Payer: COMMERCIAL

## 2025-03-28 ENCOUNTER — APPOINTMENT (OUTPATIENT)
Dept: RADIOLOGY | Facility: CLINIC | Age: 75
End: 2025-03-28
Payer: COMMERCIAL

## 2025-03-28 VITALS — WEIGHT: 201.2 LBS | HEIGHT: 70 IN | BODY MASS INDEX: 28.8 KG/M2

## 2025-03-28 DIAGNOSIS — M62.838 TRAPEZIUS MUSCLE SPASM: ICD-10-CM

## 2025-03-28 DIAGNOSIS — S16.1XXA CERVICAL STRAIN, INITIAL ENCOUNTER: ICD-10-CM

## 2025-03-28 DIAGNOSIS — M47.812 FACET ARTHROPATHY, CERVICAL: ICD-10-CM

## 2025-03-28 DIAGNOSIS — M50.30 DEGENERATIVE DISC DISEASE, CERVICAL: Primary | ICD-10-CM

## 2025-03-28 PROCEDURE — 99204 OFFICE O/P NEW MOD 45 MIN: CPT | Performed by: FAMILY MEDICINE

## 2025-03-28 PROCEDURE — 72040 X-RAY EXAM NECK SPINE 2-3 VW: CPT

## 2025-03-28 NOTE — PATIENT INSTRUCTIONS
Over the counter vitamins:    - Turmeric vitamin at least 1000 mg daily    - Tart cherry vitamin at least 1000 mg daily    - Glucosamine-chondrointin 2-3 times daily    Physical Therapy and home exercises

## 2025-03-28 NOTE — PROGRESS NOTES
Name: Mitchell Bland      : 1950      MRN: [id number]  Encounter Provider: Amy Zepeda DO  Encounter Date: 3/28/2025   Encounter department: [facility] [place]  :  Assessment & Plan  Degenerative disc disease, cervical  75-year-old right-hand-dominant male with neck pain.  X-ray cervical spine noted for multilevel disc space narrowing most pronounced C3-4 and C4-5.  Clinical impression is that he has symptoms from aggravation of degenerative changes cervical spine.  I discussed treatment regimen of supplements and formal therapy.  Will hold off from oral steroid, since he was on a course of oral steroid for gout flare.  I discussed with patient on refraining from taking NSAIDs due to his history of heart condition/status post CABG.  Start taking turmeric, tart cherry, and glucosamine supplements.  May continue with heat and Tylenol.  Start formal therapy as soon as possible and do home exercises as directed.  Follow-up if no improvement with at least 4 weeks of formal therapy.  Orders:    Ambulatory Referral to Physical Therapy; Future    Facet arthropathy, cervical  75-year-old right-hand-dominant male with neck pain.  X-ray cervical spine noted for multilevel disc space narrowing most pronounced C3-4 and C4-5.  Clinical impression is that he has symptoms from aggravation of degenerative changes cervical spine.  I discussed treatment regimen of supplements and formal therapy.  Will hold off from oral steroid, since he was on a course of oral steroid for gout flare.  I discussed with patient on refraining from taking NSAIDs due to his history of heart condition/status post CABG.  Start taking turmeric, tart cherry, and glucosamine supplements.  May continue with heat and Tylenol.  Start formal therapy as soon as possible and do home exercises as directed.  Follow-up if no improvement with at least 4 weeks of formal therapy.  Orders:    Ambulatory Referral to  "Physical Therapy; Future    Cervical strain, initial encounter    Orders:    XR spine cervical 2 or 3 vw injury; Future    Ambulatory Referral to Physical Therapy; Future    Trapezius muscle spasm    Orders:    Ambulatory Referral to Physical Therapy; Future        History of Present Illness   Chief Complaint   Patient presents with    Neck - Pain      HPI  Mitchell Bland is a 75 y.o. male right-hand-dominant who presents for evaluation of neck pain 1 month duration.  He works with [employer] and states that 1 month ago while using [employer] gun there was a kickback and he felt a pain in the neck.  He had pain described as sudden in onset, localized to the base of the neck, sharp, radiating to both shoulders, worse with turning his head, associated with limited range of motion, and improved with resting.  He has been treating with applying heat and taking Tylenol.  He states that Tylenol and heat helps with symptoms temporarily.  He denies numbness or tingling in the upper extremities.  He was seen by primary care provider and prescribed methocarbamol.    History obtained from: patient    Review of Systems   Musculoskeletal:  Positive for neck pain. Negative for arthralgias.   Neurological:  Negative for weakness and numbness.          Objective   Ht 5' 10\" (1.778 m)   Wt 91.3 kg (201 lb 3.2 oz)   BMI 28.87 kg/m²      Physical Exam  Vitals and nursing note reviewed.   Constitutional:       Appearance: Normal appearance. He is well-developed. He is not ill-appearing or diaphoretic.   HENT:      Head: Normocephalic and atraumatic.      Right Ear: External ear normal.      Left Ear: External ear normal.   Eyes:      General:         Right eye: No discharge.         Left eye: No discharge.   Pulmonary:      Effort: Pulmonary effort is normal. No respiratory distress.   Abdominal:      General: There is no distension.   Musculoskeletal:         General: Tenderness present. No swelling.   Skin:     General: Skin is warm " and dry.      Coloration: Skin is not jaundiced or pale.   Neurological:      Mental Status: He is alert and oriented to person, place, and time.   Psychiatric:         Mood and Affect: Mood normal.         Behavior: Behavior normal.         Thought Content: Thought content normal.         Judgment: Judgment normal.       Back Exam     Comments:    Cervical spine  - Midline tenderness C3-C7  - Limited range of motion with extension and sidebending to both sides  - Positive axial load  - Negative Spurling's  -Normal strength and sensation both upper extremities      Right Hand Exam     Muscle Strength   The patient has normal right wrist strength.    Other   Sensation: normal  Pulse: present      Left Hand Exam     Muscle Strength   The patient has normal left wrist strength.    Other   Sensation: normal  Pulse: present      Right Elbow Exam     Other   Sensation: normal    Comments:  5/5 flexion and extension      Left Elbow Exam     Other   Sensation: normal    Comments:  5/5 flexion and extension      Right Shoulder Exam     Muscle Strength   Abduction: 5/5     Other   Sensation: normal      Left Shoulder Exam     Muscle Strength   Abduction: 5/5     Other   Sensation: normal            I have personally reviewed pertinent films in PACS and my interpretation is  .   X-ray cervical spine noted for multilevel disc space narrowing most pronounced C3-4 and C4-5.    Administrative Statements   I have spent a total time of 45 minutes in caring for this patient on the day of the visit/encounter including Diagnostic results, Prognosis, Risks and benefits of tx options, Instructions for management, Impressions, Documenting in the medical record, Reviewing/placing orders in the medical record (including tests, medications, and/or procedures), and Obtaining or reviewing history  .

## 2025-04-01 ENCOUNTER — HOSPITAL ENCOUNTER (OUTPATIENT)
Dept: NON INVASIVE DIAGNOSTICS | Facility: CLINIC | Age: 75
Discharge: HOME | End: 2025-04-01
Payer: COMMERCIAL

## 2025-04-01 VITALS
DIASTOLIC BLOOD PRESSURE: 66 MMHG | HEART RATE: 60 BPM | HEIGHT: 70 IN | SYSTOLIC BLOOD PRESSURE: 110 MMHG | BODY MASS INDEX: 28.77 KG/M2 | WEIGHT: 201 LBS

## 2025-04-01 DIAGNOSIS — Z98.61 CAD S/P PERCUTANEOUS CORONARY ANGIOPLASTY: ICD-10-CM

## 2025-04-01 DIAGNOSIS — I25.10 CAD S/P PERCUTANEOUS CORONARY ANGIOPLASTY: ICD-10-CM

## 2025-04-01 LAB
AORTIC ROOT: 3.5 CM
BSA FOR ECHO PROCEDURE: 2.09 M2
E WAVE DECELERATION TIME: 253 MS
E/A RATIO: 0.47
FRACTIONAL SHORTENING: 36 (ref 28–44)
INTERVENTRICULAR SEPTUM IN DIASTOLE (PARASTERNAL SHORT AXIS VIEW): 1.2 CM
INTERVENTRICULAR SEPTUM: 1.2 CM (ref 0.6–1.1)
LAAS-AP2: 12.8 CM2
LAAS-AP4: 16.2 CM2
LEFT ATRIUM SIZE: 4.7 CM
LEFT ATRIUM VOLUME (MOD BIPLANE): 38 ML
LEFT ATRIUM VOLUME INDEX (MOD BIPLANE): 18.2 ML/M2
LEFT INTERNAL DIMENSION IN SYSTOLE: 3 CM (ref 2.1–4)
LEFT VENTRICULAR INTERNAL DIMENSION IN DIASTOLE: 4.7 CM (ref 3.5–6)
LEFT VENTRICULAR POSTERIOR WALL IN END DIASTOLE: 1.2 CM
LEFT VENTRICULAR STROKE VOLUME: 66 ML
LV EF US.2D.A4C+ESTIMATED: 74 %
LVSV (TEICH): 66 ML
MV E'TISSUE VEL-LAT: 10 CM/S
MV E'TISSUE VEL-SEP: 4 CM/S
MV PEAK A VEL: 0.95 M/S
MV PEAK E VEL: 45 CM/S
MV STENOSIS PRESSURE HALF TIME: 73 MS
MV VALVE AREA P 1/2 METHOD: 3.01
RIGHT ATRIUM AREA SYSTOLE A4C: 13.1 CM2
RIGHT VENTRICLE ID DIMENSION: 3.3 CM
SL CV LEFT ATRIUM LENGTH A2C: 4.4 CM
SL CV LV EF: 65
SL CV PED ECHO LEFT VENTRICLE DIASTOLIC VOLUME (MOD BIPLANE) 2D: 101 ML
SL CV PED ECHO LEFT VENTRICLE SYSTOLIC VOLUME (MOD BIPLANE) 2D: 35 ML
TRICUSPID ANNULAR PLANE SYSTOLIC EXCURSION: 1.6 CM

## 2025-04-01 PROCEDURE — 93306 TTE W/DOPPLER COMPLETE: CPT

## 2025-04-01 PROCEDURE — 93306 TTE W/DOPPLER COMPLETE: CPT | Performed by: INTERNAL MEDICINE

## 2025-04-02 ENCOUNTER — RESULTS FOLLOW-UP (OUTPATIENT)
Dept: CARDIOLOGY CLINIC | Facility: CLINIC | Age: 75
End: 2025-04-02

## 2025-04-02 ENCOUNTER — EVALUATION (OUTPATIENT)
Dept: PHYSICAL THERAPY | Facility: CLINIC | Age: 75
End: 2025-04-02
Payer: COMMERCIAL

## 2025-04-02 DIAGNOSIS — M47.812 FACET ARTHROPATHY, CERVICAL: ICD-10-CM

## 2025-04-02 DIAGNOSIS — M50.30 DEGENERATIVE DISC DISEASE, CERVICAL: ICD-10-CM

## 2025-04-02 DIAGNOSIS — S16.1XXA CERVICAL STRAIN, INITIAL ENCOUNTER: ICD-10-CM

## 2025-04-02 DIAGNOSIS — M62.838 TRAPEZIUS MUSCLE SPASM: ICD-10-CM

## 2025-04-02 PROCEDURE — 97140 MANUAL THERAPY 1/> REGIONS: CPT

## 2025-04-02 PROCEDURE — 97110 THERAPEUTIC EXERCISES: CPT

## 2025-04-02 PROCEDURE — 97161 PT EVAL LOW COMPLEX 20 MIN: CPT

## 2025-04-02 NOTE — PROGRESS NOTES
PT Evaluation     Today's date: 2025  Patient name: Mitchell Bland  : 1950  MRN: [id number]  Referring provider: Amy Zepeda*  Dx:   Encounter Diagnosis     ICD-10-CM    1. Degenerative disc disease, cervical  M50.30 Ambulatory Referral to Physical Therapy      2. Facet arthropathy, cervical  M47.812 Ambulatory Referral to Physical Therapy      3. Cervical strain, initial encounter  S16.1XXA Ambulatory Referral to Physical Therapy      4. Trapezius muscle spasm  M62.838 Ambulatory Referral to Physical Therapy          Start Time: 1617  Stop Time: 1703  Total time in clinic (min): 46 minutes    Assessment  Impairments: abnormal or restricted ROM, activity intolerance, impaired physical strength, lacks appropriate home exercise program, pain with function, poor posture , participation limitations and activity limitations  Symptom irritability: moderate    Assessment details: Patient is a pleasant 76 y/o male presenting to outpatient physical therapy with neck pain. Upon examination, moderate limitations present during cervical extension, bilateral rotation and bilateral side bending with pain at end ranges towards right unilateral movement.  Repeated motions assessment unremarkable at this time, note hypomobility of cervical and thoracic spine vertebral segments.  Manual muscle testing reveals slight weakness at anterior and middle deltoid musculature.  Hypomobility noted at cervical and thoracic vertebral segments, no red flags noted.  The patient's clinical presentation is consistent with their referring diagnosis.  Educated pt on cervical/thoracic spine anatomy, pathophysiology, and rehab prognosis.  Pt verbalized understanding is agreeable to POC.    Patient will benefit from skilled physical therapy, including therapeutic exercise, stretching, manual therapy, and modalities prn to improve their level of function, to increase overall quality of life, and to address his  impairments.    Dispensed home exercise program and educated patient on proper technique, frequency, and the possibility of DOMS for the next 24 to 48 hours. Patient demonstrated understanding and was advised to call us if he has any further questions.   Understanding of Dx/Px/POC: good     Prognosis: good    Goals  STG to be achieved by 4 weeks  -Pt will be independent with basic HEP  -Pt will improve MMT scores by 1/2 grade in all deficient planes in order to facilitate ease of functional activity  -Pt will improve ROM by 25% in all deficient planes in order to improve functional mobility  -Pt will report 4/10 pain at worst in order to facilitate return to PLOF    LTG to be achieved by Discharge  -Pt will be independent with comprehensive HEP  -Pt will improve MMT scores to WFL in all deficient planes in order to facilitate ease of functional activity  -Pt will improve ROM to WFL in all deficient planes in order to improve functional mobility  -Pt will report 1/10 pain at worst in order to demonstrate return to PLOF  -Pt will report no pain with usual ADLs       Plan  Patient would benefit from: skilled physical therapy  Referral necessary: No    Planned therapy interventions: flexibility, home exercise program, joint mobilization, manual therapy, IASTM, neuromuscular re-education, patient/caregiver education, strengthening, stretching, therapeutic activities, therapeutic exercise and postural training    Frequency: 2x week  Duration in weeks: 12  Plan of Care beginning date: 4/2/2025  Plan of Care expiration date: 6/25/2025  Treatment plan discussed with: patient        Subjective Evaluation    History of Present Illness  Mechanism of injury: Mitchell is a 75 y.o. male presenting to physical therapy on 04/02/25 with referral from MD for neck pain.  Pt states this has been ongoing for about a month now, pt denies any injury, trauma, or onciting incdenet.  Pt states he works with [employer] and has to undergo a lot of  cervical flexion throughout the day, pt states that he was working with one of his tools and he had to unjam it by pulling on it very hard, once he broke it free he felt his neck whip back violently.  Pt states that since then he has felt a heavy ache in the back of his neck at the C7 level, denies any radicular symptoms at this time but does state that if he overworks himself he will start to feel pain at the top of his shoulders.  Pt states that neck movement in general seems to aggravate it as well as having to bend the flores for long periods of time.           Quality of life: good    Patient Goals  Patient goals for therapy: decreased pain and increased motion    Pain  Current pain ratin  At best pain ratin  At worst pain ratin  Quality: dull ache  Relieving factors: heat and medications  Progression: improved      Diagnostic Tests  X-ray: normal        Objective     Active Range of Motion   Cervical/Thoracic Spine       Cervical    Flexion:  WFL  Extension:  with pain Restriction level: moderate  Left lateral flexion:  Restriction level: moderate  Right lateral flexion:  with pain Restriction level moderate  Left rotation:  Restriction level: moderate  Right rotation:  with pain Restriction level: moderate  Left Shoulder   Flexion: WFL  Abduction: WFL    Right Shoulder   Flexion: WFL  Abduction: WFL    Joint Play     Hypomobile: C2, C3, C4, C5, C6, C7, T1, T2, T3, T4, T5, T6 and T7   Mechanical Assessment    Cervical    Seated Flexion:  repeated movements  Pain location: no change  Seated Extension: repeated movements    Thoracic      Lumbar      Strength/Myotome Testing     Left Shoulder     Planes of Motion   Flexion: 4+   Abduction: 4     Right Shoulder     Planes of Motion   Flexion: 4+   Abduction: 4              Precautions: HTN, Hx of CA    POC expires Unit limit Auth  expiration date PT/OT + Visit Limit?   25 N/A  BOMN                 Visit/Unit Tracking  AUTH Status:  Date                N/A Used 1               Remaining                     Manuals 4/2            Cervica/thoracic CPA JK            Cervical traction JK, D/C                                      Neuro Re-Ed             Cervical retractions HEP            Scap squeezes             Webslide: M, L rows             Webslide: Scap W's                                                    Ther Ex             Pt education             UBE              Repeated cervical ext HEP            Shoulder fwd/bwd rolls HEP            UT stretch             Wall slides             Doorway pec stretch                                                    Ther Activity                                       Gait Training                                       Modalities

## 2025-04-04 NOTE — TELEPHONE ENCOUNTER
----- Message from George Esteban MD sent at 4/2/2025  4:45 PM EDT -----  Please call and inform the patient that the echo showed mild thickening and stiffening of the left ventricular muscle.  And a small septal aneurysm.  Will repeat echo in a couple of years to follow-up

## 2025-04-09 DIAGNOSIS — M79.674 GREAT TOE PAIN, RIGHT: ICD-10-CM

## 2025-04-10 ENCOUNTER — OFFICE VISIT (OUTPATIENT)
Dept: PHYSICAL THERAPY | Facility: CLINIC | Age: 75
End: 2025-04-10
Payer: COMMERCIAL

## 2025-04-10 DIAGNOSIS — M47.812 FACET ARTHROPATHY, CERVICAL: ICD-10-CM

## 2025-04-10 DIAGNOSIS — M50.30 DEGENERATIVE DISC DISEASE, CERVICAL: Primary | ICD-10-CM

## 2025-04-10 DIAGNOSIS — S16.1XXA CERVICAL STRAIN, INITIAL ENCOUNTER: ICD-10-CM

## 2025-04-10 DIAGNOSIS — M62.838 TRAPEZIUS MUSCLE SPASM: ICD-10-CM

## 2025-04-10 PROCEDURE — 97112 NEUROMUSCULAR REEDUCATION: CPT

## 2025-04-10 PROCEDURE — 97110 THERAPEUTIC EXERCISES: CPT

## 2025-04-10 RX ORDER — PREDNISONE 10 MG/1
TABLET ORAL
Qty: 28 TABLET | Refills: 0 | Status: SHIPPED | OUTPATIENT
Start: 2025-04-10

## 2025-04-10 NOTE — PROGRESS NOTES
"Daily Note     Today's date: 4/10/2025  Patient name: Mitchell Bland  : 1950  MRN: [id number]  Referring provider: Amy Zepeda*  Dx:   Encounter Diagnosis     ICD-10-CM    1. Degenerative disc disease, cervical  M50.30       2. Facet arthropathy, cervical  M47.812       3. Cervical strain, initial encounter  S16.1XXA       4. Trapezius muscle spasm  M62.838           Start Time: 1651  Stop Time: 1732  Total time in clinic (min): 41 minutes    Subjective: Pt noted that he has no pain upon arrival but some tenderness on the R side of his neck.       Objective: See treatment diary below      Assessment:  Continued with treatment session with focus on cervical ROM. No increase in pain noted but still reported some discomfort on  R side of UT> Tolerated treatment well. Patient exhibited good technique with therapeutic exercises and would benefit from continued PT  HEP noted to continue as able with no changes in pain status.     Plan: Continue per plan of care.      Precautions: HTN, Hx of CA    POC expires Unit limit Auth  expiration date PT/OT + Visit Limit?   25 N/A  BOMN                 Visit/Unit Tracking  AUTH Status:  Date 4/2 4/10             N/A Used 1 2              Remaining                     Manuals 4/2 4/10           Cervica/thoracic CPA JK            Cervical traction JK, D/C            L sided UT STM.   NV                        Neuro Re-Ed             Cervical retractions HEP 10x            Scap squeezes  10x ea.            Webslide: M, L rows             Webslide: Scap W's                                                    Ther Ex             Pt education             UBE              Repeated cervical ext HEP 10x            Shoulder fwd/bwd rolls HEP  15x bwd only            UT stretch  UT and levator  30\"x 3 b/l            Wall slides             Doorway pec stretch                                                    Ther Activity                                       Gait " Training                                       Modalities

## 2025-04-16 ENCOUNTER — OFFICE VISIT (OUTPATIENT)
Dept: PHYSICAL THERAPY | Facility: CLINIC | Age: 75
End: 2025-04-16
Payer: COMMERCIAL

## 2025-04-16 DIAGNOSIS — M62.838 TRAPEZIUS MUSCLE SPASM: ICD-10-CM

## 2025-04-16 DIAGNOSIS — M47.812 FACET ARTHROPATHY, CERVICAL: ICD-10-CM

## 2025-04-16 DIAGNOSIS — M50.30 DEGENERATIVE DISC DISEASE, CERVICAL: Primary | ICD-10-CM

## 2025-04-16 DIAGNOSIS — S16.1XXA CERVICAL STRAIN, INITIAL ENCOUNTER: ICD-10-CM

## 2025-04-16 PROCEDURE — 97110 THERAPEUTIC EXERCISES: CPT

## 2025-04-16 PROCEDURE — 97140 MANUAL THERAPY 1/> REGIONS: CPT

## 2025-04-16 NOTE — PROGRESS NOTES
"Daily Note     Today's date: 2025  Patient name: Mitchell Bland  : 1950  MRN: [id number]  Referring provider: Amy Zepeda*  Dx:   Encounter Diagnosis     ICD-10-CM    1. Degenerative disc disease, cervical  M50.30       2. Facet arthropathy, cervical  M47.812       3. Cervical strain, initial encounter  S16.1XXA       4. Trapezius muscle spasm  M62.838                      Subjective: Pt reports that he no longer feels pain in his neck, that it it feels more like a stiffness.      Objective: See treatment diary below      Assessment: Tolerated treatment well.  Implemented additional TE's for facilitation of cervicothoracic extension as well as periscapular strength.  Pt able to complete with good technique and mild soreness noted at end of visit.  Patient would benefit from continued PT      Plan: Continue per plan of care.      Precautions: HTN, Hx of CA    POC expires Unit limit Auth  expiration date PT/OT + Visit Limit?   25 N/A  BOMN                 Visit/Unit Tracking  AUTH Status:  Date 4/2 4/10 4/16            N/A Used 1 2 3             Remaining                     Manuals 4/2 4/10 4/16          Cervica/thoracic CPA JK  JK grade II-III          Cervical traction JK, D/C            L sided UT STM.   NV                        Neuro Re-Ed             Cervical retractions HEP 10x            Scap squeezes  10x ea.            Webslide: M, L rows   X20 ea GTB          Webslide: Scap W's                                                    Ther Ex             Pt education             UBE              Repeated cervical ext HEP 10x            Shoulder fwd/bwd rolls HEP  15x bwd only            UT stretch  UT and levator  30\"x 3 b/l  UT and levator  30\"x 3 b/l           Wall slides   2x10 5'' hold          Doorway pec stretch   3x30''                                                 Ther Activity                                       Gait Training                                       Modalities "

## 2025-04-23 ENCOUNTER — OFFICE VISIT (OUTPATIENT)
Dept: PHYSICAL THERAPY | Facility: CLINIC | Age: 75
End: 2025-04-23
Attending: FAMILY MEDICINE
Payer: COMMERCIAL

## 2025-04-23 DIAGNOSIS — M47.812 FACET ARTHROPATHY, CERVICAL: ICD-10-CM

## 2025-04-23 DIAGNOSIS — S16.1XXA CERVICAL STRAIN, INITIAL ENCOUNTER: ICD-10-CM

## 2025-04-23 DIAGNOSIS — M50.30 DEGENERATIVE DISC DISEASE, CERVICAL: Primary | ICD-10-CM

## 2025-04-23 DIAGNOSIS — M62.838 TRAPEZIUS MUSCLE SPASM: ICD-10-CM

## 2025-04-23 PROCEDURE — 97110 THERAPEUTIC EXERCISES: CPT

## 2025-04-23 PROCEDURE — 97140 MANUAL THERAPY 1/> REGIONS: CPT

## 2025-04-23 NOTE — PROGRESS NOTES
"Daily Note     Today's date: 2025  Patient name: Mitchell Bland  : 1950  MRN: [id number]  Referring provider: Amy Zepeda*  Dx:   Encounter Diagnosis     ICD-10-CM    1. Degenerative disc disease, cervical  M50.30       2. Facet arthropathy, cervical  M47.812       3. Cervical strain, initial encounter  S16.1XXA       4. Trapezius muscle spasm  M62.838                      Subjective: Pt reports neck is feeling a bit better, states he feels the stretches help.      Objective: See treatment diary below      Assessment: Tolerated treatment well. Implemented additional stretches for cervical spine and TE's for periscapular strengthening.  Pt able to complete with good technique and no increase in P!  Patient would benefit from continued PT      Plan: Continue per plan of care.      Precautions: HTN, Hx of CA    POC expires Unit limit Auth  expiration date PT/OT + Visit Limit?   25 N/A  BOMN                 Visit/Unit Tracking  AUTH Status:  Date 4/2 4/10 4/16 4/23           N/A Used 1 2 3 4            Remaining                     Manuals 4/2 4/10 4/16 4/23         Cervica/thoracic CPA JK  JK grade II-III JK grade II-III         Cervical traction JK, D/C            L sided UT STM.   NV                        Neuro Re-Ed             Cervical retractions HEP 10x            Scap squeezes  10x ea.            Webslide: M, L rows   X20 ea GTB X20 ea GTB         Webslide: ER/IR             Webslide: Scap W's    3''x20 GTB                                                Ther Ex             Pt education    HEP         UBE     5'/5'         Repeated cervical ext HEP 10x            Shoulder fwd/bwd rolls HEP  15x bwd only            UT stretch  UT and levator  30\"x 3 b/l  UT and levator  30\"x 3 b/l  UT and levator  30\"x 3 b/l          Towel snag    10''x10 B/L         Wall slides   2x10 5'' hold 2x10 5'' hold         Doorway pec stretch   3x30''                                                 Ther " Activity                                       Gait Training                                       Modalities

## 2025-04-30 ENCOUNTER — APPOINTMENT (OUTPATIENT)
Dept: PHYSICAL THERAPY | Facility: CLINIC | Age: 75
End: 2025-04-30
Attending: FAMILY MEDICINE
Payer: COMMERCIAL

## 2025-05-08 ENCOUNTER — RA CDI HCC (OUTPATIENT)
Dept: OTHER | Facility: HOSPITAL | Age: 75
End: 2025-05-08

## 2025-05-15 ENCOUNTER — TELEPHONE (OUTPATIENT)
Age: 75
End: 2025-05-15

## 2025-05-15 ENCOUNTER — OFFICE VISIT (OUTPATIENT)
Age: 75
End: 2025-05-15
Payer: COMMERCIAL

## 2025-05-15 VITALS
BODY MASS INDEX: 28.77 KG/M2 | OXYGEN SATURATION: 97 % | RESPIRATION RATE: 16 BRPM | HEIGHT: 70 IN | WEIGHT: 201 LBS | HEART RATE: 70 BPM | DIASTOLIC BLOOD PRESSURE: 80 MMHG | SYSTOLIC BLOOD PRESSURE: 144 MMHG

## 2025-05-15 DIAGNOSIS — M10.371 ACUTE GOUT DUE TO RENAL IMPAIRMENT INVOLVING TOE OF RIGHT FOOT: ICD-10-CM

## 2025-05-15 DIAGNOSIS — I10 PRIMARY HYPERTENSION: ICD-10-CM

## 2025-05-15 DIAGNOSIS — E78.2 MIXED HYPERLIPIDEMIA: ICD-10-CM

## 2025-05-15 DIAGNOSIS — M1A.3710 CHRONIC GOUT DUE TO RENAL IMPAIRMENT INVOLVING TOE OF RIGHT FOOT WITHOUT TOPHUS: ICD-10-CM

## 2025-05-15 DIAGNOSIS — M79.674 GREAT TOE PAIN, RIGHT: ICD-10-CM

## 2025-05-15 DIAGNOSIS — I25.10 CAD S/P PERCUTANEOUS CORONARY ANGIOPLASTY: Primary | ICD-10-CM

## 2025-05-15 DIAGNOSIS — C18.9 MALIGNANT NEOPLASM OF COLON, UNSPECIFIED PART OF COLON (HCC): ICD-10-CM

## 2025-05-15 DIAGNOSIS — E11.9 DIABETES MELLITUS IN REMISSION (HCC): ICD-10-CM

## 2025-05-15 DIAGNOSIS — Z98.61 CAD S/P PERCUTANEOUS CORONARY ANGIOPLASTY: Primary | ICD-10-CM

## 2025-05-15 DIAGNOSIS — F32.5 MAJOR DEPRESSIVE DISORDER WITH SINGLE EPISODE, IN FULL REMISSION (HCC): ICD-10-CM

## 2025-05-15 PROCEDURE — G2211 COMPLEX E/M VISIT ADD ON: HCPCS

## 2025-05-15 PROCEDURE — 99214 OFFICE O/P EST MOD 30 MIN: CPT

## 2025-05-15 RX ORDER — COLCHICINE 0.6 MG/1
0.6 TABLET ORAL DAILY
Qty: 90 TABLET | Refills: 0 | Status: SHIPPED | OUTPATIENT
Start: 2025-05-15 | End: 2025-05-15 | Stop reason: SDUPTHER

## 2025-05-15 RX ORDER — COLCHICINE 0.6 MG/1
TABLET ORAL
Qty: 90 TABLET | Refills: 0 | Status: SHIPPED | OUTPATIENT
Start: 2025-05-15

## 2025-05-15 NOTE — PATIENT INSTRUCTIONS
Taper allopurinol as follows: allopurinol 100 mg for 2 weeks, then 200 mg for 2 weeks, then 300 mg for 3 weeks in addition to a daily Colcrys.

## 2025-05-15 NOTE — ASSESSMENT & PLAN NOTE
Currently on a daily baby aspirin.  He is s/p PCI in 2007, s/p CABG x 2 in 2008 at [facility]. He denies any anginal symptoms.  He follows with cardiology.

## 2025-05-15 NOTE — ASSESSMENT & PLAN NOTE
Due for A1c.  No home sugars.  Currently controlled with diet.  Due for diabetic foot and eye exam.  Lab Results   Component Value Date    HGBA1C 6.7 (H) 11/19/2024

## 2025-05-15 NOTE — PROGRESS NOTES
Name: Mitchell Bland      : 1950      MRN: [id number]  Encounter Provider: Meg Alcantar PA-C  Encounter Date: 5/15/2025   Encounter department: [facility] INTERNAL MEDICINE [facility] [address]  :  Assessment & Plan  CAD S/P percutaneous coronary angioplasty  Currently on a daily baby aspirin.  He is s/p PCI in , s/p CABG x 2 in  at [facility]. He denies any anginal symptoms.  He follows with cardiology.        Primary hypertension  BP in office is 144/80, on recheck it was 108/70. No home BP's.  Continue chlorthalidone 25 mg, enalapril 10 mg twice daily, and metoprolol 25 mg daily.  Limit salt intake less than 2000 mg daily.       Malignant neoplasm of colon, unspecified part of colon (HCC)  Recent colonoscopy was stable, he is due for a follow-up 3/27.        Diabetes mellitus in remission (HCC)  Due for A1c.  No home sugars.  Currently controlled with diet.  Due for diabetic foot and eye exam.  Lab Results   Component Value Date    HGBA1C 6.7 (H) 2024          Major depressive disorder with single episode, in full remission (HCC)  Currently stable on paxil 30 mg daily.       Mixed hyperlipidemia  Due for lipid panel. Continue atorvastatin 40 mg daily.        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 Recommended starting daily Colcrys and a maintenance medication. Taper allopurinol as follows: allopurinol 100 mg for 2 weeks, then 200 mg for 2 weeks, then 300 mg for 3 weeks in addition to a daily Colcrys.  Will check uric acid and BMP 2 weeks after being on 300 mg of allopurinol.   Orders:    Basic metabolic panel; Future    Uric acid; Future    Great toe pain, right  See plan as above.  Orders:    colchicine (COLCRYS) 0.6 mg tablet; Take 1 tablet (0.6 mg total) by mouth daily Take 2 now, then 1 every 6 hours until gout resolves.          Depression Screening and Follow-up Plan: Patient was screened for depression during today's encounter.  They screened negative with a PHQ-9 score of 2.        History of Present Illness   Patient is a 76 yo male that presents today for a 6 month f/up. He has no new complaints today.    He eats a well-balanced diet of fruits, veggies, and lean meats. Recommended increasing water intake, he should be urinating pale to clear yellow every 2-3 hours. He is active around the house and works on cars. He sleeps well. He drinks alcohol socially. He denies any illicit drug use or tobacco use. He is UTD with dentist and eye doctor. He lives at home with his wife, [place]. 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 He is going to his step sons for thanksgiving.    Health Maintenance:  Due for labs, DM foot and eye exam.  Immunizations: due for PCV, shingles, RSV.       Review of Systems   Constitutional:  Negative for chills, fatigue and fever.   HENT:  Negative for ear discharge, ear pain, postnasal drip, rhinorrhea, sinus pressure, sinus pain, sore throat, tinnitus and trouble swallowing.    Eyes:  Negative for pain, discharge and itching.   Respiratory:  Negative for cough, shortness of breath and wheezing.    Cardiovascular:  Negative for chest pain, palpitations and leg swelling.   Gastrointestinal:  Negative for abdominal pain, constipation, diarrhea, nausea and vomiting.   Endocrine: Negative for polydipsia, polyphagia and polyuria.   Genitourinary:  Negative for difficulty urinating, frequency, hematuria and urgency.   Musculoskeletal:  Negative for arthralgias, joint swelling and myalgias.   Skin:  Negative for color change.   Allergic/Immunologic: Negative for environmental allergies.   Neurological:  Negative for dizziness, weakness, light-headedness, numbness and headaches.   Hematological:  Negative for adenopathy.   Psychiatric/Behavioral:  Negative for decreased concentration and sleep disturbance. The patient is not nervous/anxious.        Objective   /80 (BP Location: Left arm)   Pulse 70    "Resp 16   Ht 5' 10\" (1.778 m)   Wt 91.2 kg (201 lb)   SpO2 97%   BMI 28.84 kg/m²      Physical Exam  Vitals and nursing note reviewed.   Constitutional:       General: He is awake.      Appearance: Normal appearance. He is well-developed, well-groomed and overweight.   HENT:      Head: Normocephalic and atraumatic.      Right Ear: Hearing and external ear normal.      Left Ear: Hearing and external ear normal.      Nose: Nose normal.      Mouth/Throat:      Lips: Pink.      Mouth: Mucous membranes are moist.     Eyes:      General: Lids are normal. Vision grossly intact. Gaze aligned appropriately.      Conjunctiva/sclera: Conjunctivae normal.     Neck:      Vascular: No carotid bruit.      Trachea: Trachea and phonation normal.     Cardiovascular:      Rate and Rhythm: Normal rate and regular rhythm.      Heart sounds: Normal heart sounds, S1 normal and S2 normal. No murmur heard.     No friction rub. No gallop.   Pulmonary:      Effort: Pulmonary effort is normal.      Breath sounds: Normal breath sounds and air entry. No decreased breath sounds, wheezing, rhonchi or rales.   Abdominal:      General: Abdomen is protuberant.     Musculoskeletal:      Cervical back: Neck supple.      Right lower leg: No edema.      Left lower leg: No edema.     Skin:     General: Skin is warm.      Capillary Refill: Capillary refill takes less than 2 seconds.     Neurological:      Mental Status: He is alert.     Psychiatric:         Attention and Perception: Attention and perception normal.         Mood and Affect: Mood and affect normal.         Speech: Speech normal.         Behavior: Behavior normal. Behavior is cooperative.         Thought Content: Thought content normal.         Cognition and Memory: Cognition and memory normal.         Judgment: Judgment normal.         "

## 2025-05-15 NOTE — TELEPHONE ENCOUNTER
[facility] calling in regarding the script that they just received. Stated that it has two different sets of directions on it and they need a script send over with the one set of directions.

## 2025-05-15 NOTE — ASSESSMENT & PLAN NOTE
BP in office is 144/80, on recheck it was 108/70. No home BP's.  Continue chlorthalidone 25 mg, enalapril 10 mg twice daily, and metoprolol 25 mg daily.  Limit salt intake less than 2000 mg daily.

## 2025-05-31 DIAGNOSIS — E11.9 TYPE 2 DIABETES MELLITUS WITHOUT COMPLICATION, WITH LONG-TERM CURRENT USE OF INSULIN (HCC): Primary | ICD-10-CM

## 2025-05-31 DIAGNOSIS — M79.674 GREAT TOE PAIN, RIGHT: ICD-10-CM

## 2025-05-31 DIAGNOSIS — Z79.4 TYPE 2 DIABETES MELLITUS WITHOUT COMPLICATION, WITH LONG-TERM CURRENT USE OF INSULIN (HCC): Primary | ICD-10-CM

## 2025-06-02 RX ORDER — PREDNISONE 10 MG/1
TABLET ORAL
Qty: 28 TABLET | Refills: 0 | Status: SHIPPED | OUTPATIENT
Start: 2025-06-02

## 2025-06-13 DIAGNOSIS — I10 ESSENTIAL HYPERTENSION: ICD-10-CM

## 2025-06-15 RX ORDER — ENALAPRIL MALEATE 10 MG/1
10 TABLET ORAL 2 TIMES DAILY
Qty: 180 TABLET | Refills: 1 | Status: SHIPPED | OUTPATIENT
Start: 2025-06-15

## 2025-06-21 DIAGNOSIS — F32.A DEPRESSION, UNSPECIFIED DEPRESSION TYPE: ICD-10-CM

## 2025-06-22 RX ORDER — PAROXETINE 30 MG/1
30 TABLET, FILM COATED ORAL DAILY
Qty: 30 TABLET | Refills: 5 | Status: SHIPPED | OUTPATIENT
Start: 2025-06-22

## 2025-07-04 DIAGNOSIS — E78.2 MIXED HYPERLIPIDEMIA: ICD-10-CM

## 2025-07-07 RX ORDER — ATORVASTATIN CALCIUM 40 MG/1
40 TABLET, FILM COATED ORAL DAILY
Qty: 90 TABLET | Refills: 1 | Status: SHIPPED | OUTPATIENT
Start: 2025-07-07

## 2025-08-05 DIAGNOSIS — K21.9 GASTROESOPHAGEAL REFLUX DISEASE WITHOUT ESOPHAGITIS: ICD-10-CM

## 2025-08-05 RX ORDER — OMEPRAZOLE 20 MG/1
20 CAPSULE, DELAYED RELEASE ORAL DAILY
Qty: 30 CAPSULE | Refills: 5 | Status: SHIPPED | OUTPATIENT
Start: 2025-08-05